# Patient Record
Sex: FEMALE | Race: WHITE | NOT HISPANIC OR LATINO | Employment: OTHER | URBAN - METROPOLITAN AREA
[De-identification: names, ages, dates, MRNs, and addresses within clinical notes are randomized per-mention and may not be internally consistent; named-entity substitution may affect disease eponyms.]

---

## 2023-08-26 PROBLEM — E04.1 THYROID NODULE: Status: ACTIVE | Noted: 2023-08-26

## 2023-08-26 PROBLEM — E03.9 HYPOTHYROID: Status: ACTIVE | Noted: 2023-08-26

## 2023-08-26 PROBLEM — M81.0 OSTEOPOROSIS: Status: ACTIVE | Noted: 2023-08-26

## 2023-08-26 PROBLEM — M79.2 NEUROPATHIC PAIN: Status: ACTIVE | Noted: 2023-08-26

## 2023-08-26 PROBLEM — E21.3 HYPERPARATHYROIDISM (MULTI): Status: ACTIVE | Noted: 2023-08-26

## 2023-08-26 PROBLEM — E11.9 TYPE 2 DIABETES MELLITUS WITHOUT COMPLICATION (MULTI): Status: ACTIVE | Noted: 2023-08-26

## 2023-08-26 PROBLEM — M06.9 RHEUMATOID ARTHRITIS (MULTI): Status: ACTIVE | Noted: 2023-08-26

## 2023-08-26 PROBLEM — Z86.73 HISTORY OF CEREBROVASCULAR ACCIDENT: Status: ACTIVE | Noted: 2023-08-26

## 2023-08-26 PROBLEM — Z98.84 HISTORY OF GASTRIC BYPASS: Status: ACTIVE | Noted: 2023-08-26

## 2023-08-26 PROBLEM — E11.65 HYPERGLYCEMIA DUE TO TYPE 2 DIABETES MELLITUS (MULTI): Status: ACTIVE | Noted: 2023-08-26

## 2023-08-26 PROBLEM — G47.30 SLEEP APNEA: Status: ACTIVE | Noted: 2023-08-26

## 2023-08-26 PROBLEM — N32.81 OVERACTIVE BLADDER: Status: ACTIVE | Noted: 2023-08-26

## 2023-08-26 PROBLEM — K90.9 INTESTINAL MALABSORPTION (HHS-HCC): Status: ACTIVE | Noted: 2023-08-26

## 2023-08-26 PROBLEM — S13.9XXA NECK SPRAIN: Status: ACTIVE | Noted: 2023-08-26

## 2023-08-26 PROBLEM — E83.52 HYPERCALCEMIA: Status: ACTIVE | Noted: 2023-08-26

## 2023-08-26 PROBLEM — E66.01 MORBID (SEVERE) OBESITY DUE TO EXCESS CALORIES (MULTI): Status: ACTIVE | Noted: 2023-08-26

## 2023-08-26 PROBLEM — M54.12 CERVICAL RADICULITIS: Status: ACTIVE | Noted: 2023-08-26

## 2023-08-26 PROBLEM — M10.9 GOUT: Status: ACTIVE | Noted: 2023-08-26

## 2023-08-26 PROBLEM — R53.82 CHRONIC FATIGUE: Status: ACTIVE | Noted: 2023-08-26

## 2023-08-26 PROBLEM — E78.00 PURE HYPERCHOLESTEROLEMIA: Status: ACTIVE | Noted: 2023-08-26

## 2023-08-26 PROBLEM — I10 BENIGN HYPERTENSION: Status: ACTIVE | Noted: 2023-08-26

## 2023-08-26 PROBLEM — M19.90 OSTEOARTHRITIS: Status: ACTIVE | Noted: 2023-08-26

## 2023-08-26 PROBLEM — J32.9 SINUSITIS: Status: ACTIVE | Noted: 2023-08-26

## 2023-08-26 PROBLEM — E53.8 VITAMIN B 12 DEFICIENCY: Status: ACTIVE | Noted: 2023-08-26

## 2023-08-26 PROBLEM — E55.9 VITAMIN D DEFICIENCY: Status: ACTIVE | Noted: 2023-08-26

## 2023-08-26 PROBLEM — G62.9 NEUROPATHY: Status: ACTIVE | Noted: 2023-08-26

## 2023-08-26 PROBLEM — M25.519 SHOULDER JOINT PAIN: Status: ACTIVE | Noted: 2023-08-26

## 2023-08-26 PROBLEM — K21.9 GASTROESOPHAGEAL REFLUX DISEASE: Status: ACTIVE | Noted: 2023-08-26

## 2023-08-26 PROBLEM — S92.902D CLOSED FRACTURE OF LEFT FOOT WITH ROUTINE HEALING: Status: ACTIVE | Noted: 2023-08-26

## 2023-08-26 PROBLEM — F41.9 ANXIETY: Status: ACTIVE | Noted: 2023-08-26

## 2023-08-26 PROBLEM — K63.5 POLYP OF COLON: Status: ACTIVE | Noted: 2023-08-26

## 2023-08-26 PROBLEM — M79.7 FIBROMYALGIA: Status: ACTIVE | Noted: 2023-08-26

## 2023-08-26 RX ORDER — DAPAGLIFLOZIN 10 MG/1
10 TABLET, FILM COATED ORAL EVERY MORNING
COMMUNITY
End: 2024-02-29 | Stop reason: ALTCHOICE

## 2023-08-26 RX ORDER — SULFASALAZINE 500 MG/1
500 TABLET ORAL 2 TIMES DAILY
COMMUNITY
Start: 2021-05-10

## 2023-08-26 RX ORDER — CHOLECALCIFEROL (VITAMIN D3) 1250 MCG
1 TABLET ORAL
COMMUNITY
Start: 2017-09-19

## 2023-08-26 RX ORDER — GABAPENTIN 300 MG/1
600 CAPSULE ORAL NIGHTLY
COMMUNITY
End: 2023-10-10 | Stop reason: ENTERED-IN-ERROR

## 2023-08-26 RX ORDER — GLIMEPIRIDE 2 MG/1
1 TABLET ORAL
COMMUNITY
Start: 2023-02-23

## 2023-08-26 RX ORDER — VENLAFAXINE HYDROCHLORIDE 37.5 MG/1
37.5 TABLET, EXTENDED RELEASE ORAL DAILY
COMMUNITY
End: 2023-12-05 | Stop reason: ALTCHOICE

## 2023-08-26 RX ORDER — ATORVASTATIN CALCIUM 10 MG/1
5 TABLET, FILM COATED ORAL DAILY
COMMUNITY
Start: 2015-01-28 | End: 2023-12-05 | Stop reason: ALTCHOICE

## 2023-08-26 RX ORDER — CALCIUM CARBONATE 200(500)MG
1 TABLET,CHEWABLE ORAL DAILY
COMMUNITY

## 2023-08-26 RX ORDER — CYANOCOBALAMIN 1000 UG/ML
INJECTION, SOLUTION INTRAMUSCULAR; SUBCUTANEOUS
COMMUNITY

## 2023-08-26 RX ORDER — TRAMADOL HYDROCHLORIDE 50 MG/1
50 TABLET ORAL DAILY PRN
COMMUNITY
Start: 2022-12-27 | End: 2023-12-05 | Stop reason: SDUPTHER

## 2023-08-26 RX ORDER — CYCLOBENZAPRINE HCL 10 MG
10 TABLET ORAL NIGHTLY PRN
COMMUNITY

## 2023-08-26 RX ORDER — AMITRIPTYLINE HYDROCHLORIDE 10 MG/1
20 TABLET, FILM COATED ORAL NIGHTLY
COMMUNITY

## 2023-08-26 RX ORDER — COLCHICINE 0.6 MG/1
0.6 TABLET ORAL DAILY
COMMUNITY
Start: 2021-05-10 | End: 2024-02-29 | Stop reason: ALTCHOICE

## 2023-08-26 RX ORDER — OXYBUTYNIN CHLORIDE 5 MG/1
5 TABLET ORAL DAILY
COMMUNITY
Start: 2017-01-31 | End: 2024-02-06 | Stop reason: SDUPTHER

## 2023-08-26 RX ORDER — SERTRALINE HYDROCHLORIDE 25 MG/1
25 TABLET, FILM COATED ORAL DAILY
COMMUNITY
Start: 2023-03-16 | End: 2024-04-25 | Stop reason: SDUPTHER

## 2023-08-26 RX ORDER — DENOSUMAB 60 MG/ML
INJECTION SUBCUTANEOUS
COMMUNITY

## 2023-08-26 RX ORDER — SUCRALFATE 1 G/1
1 TABLET ORAL
COMMUNITY

## 2023-08-26 RX ORDER — OMEPRAZOLE 40 MG/1
40 CAPSULE, DELAYED RELEASE ORAL
COMMUNITY
Start: 2022-09-08

## 2023-08-26 RX ORDER — LEVOTHYROXINE SODIUM 50 UG/1
50 TABLET ORAL
COMMUNITY
End: 2023-12-05 | Stop reason: ALTCHOICE

## 2023-08-26 RX ORDER — LEFLUNOMIDE 10 MG/1
1 TABLET ORAL DAILY
COMMUNITY

## 2023-08-29 ENCOUNTER — HOSPITAL ENCOUNTER (OUTPATIENT)
Dept: DATA CONVERSION | Facility: HOSPITAL | Age: 73
Discharge: HOME | End: 2023-08-29
Payer: MEDICARE

## 2023-08-29 DIAGNOSIS — K75.9 INFLAMMATORY LIVER DISEASE, UNSPECIFIED: ICD-10-CM

## 2023-08-29 DIAGNOSIS — R94.5 ABNORMAL RESULTS OF LIVER FUNCTION STUDIES: ICD-10-CM

## 2023-08-29 DIAGNOSIS — M54.12 RADICULOPATHY, CERVICAL REGION: ICD-10-CM

## 2023-08-29 DIAGNOSIS — M06.09 RHEUMATOID ARTHRITIS WITHOUT RHEUMATOID FACTOR, MULTIPLE SITES (MULTI): ICD-10-CM

## 2023-08-30 LAB
HAV IGM SER QL: NORMAL
HBV CORE IGM SER QL: NORMAL
HBV SURFACE AG SER QL: NORMAL
HCV AB SER QL: NORMAL

## 2023-09-01 ENCOUNTER — HOSPITAL ENCOUNTER (OUTPATIENT)
Dept: DATA CONVERSION | Facility: HOSPITAL | Age: 73
Discharge: HOME | End: 2023-09-01
Payer: MEDICARE

## 2023-09-01 DIAGNOSIS — M06.09 RHEUMATOID ARTHRITIS WITHOUT RHEUMATOID FACTOR, MULTIPLE SITES (MULTI): ICD-10-CM

## 2023-09-19 ENCOUNTER — HOSPITAL ENCOUNTER (OUTPATIENT)
Dept: DATA CONVERSION | Facility: HOSPITAL | Age: 73
Discharge: HOME | End: 2023-09-19
Payer: MEDICARE

## 2023-09-19 DIAGNOSIS — E04.1 NONTOXIC SINGLE THYROID NODULE: ICD-10-CM

## 2023-09-19 LAB — TSH SERPL DL<=0.05 MIU/L-ACNC: 2.93 MIU/L (ref 0.27–4.2)

## 2023-09-29 ENCOUNTER — HOSPITAL ENCOUNTER (OUTPATIENT)
Dept: DATA CONVERSION | Facility: HOSPITAL | Age: 73
Discharge: HOME | End: 2023-09-29
Payer: MEDICARE

## 2023-09-29 DIAGNOSIS — M06.09 RHEUMATOID ARTHRITIS WITHOUT RHEUMATOID FACTOR, MULTIPLE SITES (MULTI): ICD-10-CM

## 2023-10-10 ENCOUNTER — HOSPITAL ENCOUNTER (OUTPATIENT)
Dept: RADIOLOGY | Facility: HOSPITAL | Age: 73
Discharge: HOME | End: 2023-10-10
Payer: MEDICARE

## 2023-10-10 VITALS
SYSTOLIC BLOOD PRESSURE: 143 MMHG | TEMPERATURE: 98.3 F | HEART RATE: 81 BPM | DIASTOLIC BLOOD PRESSURE: 66 MMHG | RESPIRATION RATE: 16 BRPM | OXYGEN SATURATION: 98 %

## 2023-10-10 DIAGNOSIS — R10.13 EPIGASTRIC PAIN SYNDROME: Primary | ICD-10-CM

## 2023-10-10 PROCEDURE — 62325 NJX INTERLAMINAR CRV/THRC: CPT | Performed by: ANESTHESIOLOGY

## 2023-10-10 PROCEDURE — G0500 MOD SEDAT ENDO SERVICE >5YRS: HCPCS | Performed by: ANESTHESIOLOGY

## 2023-10-10 PROCEDURE — 77003 FLUOROGUIDE FOR SPINE INJECT: CPT

## 2023-10-10 PROCEDURE — 2500000004 HC RX 250 GENERAL PHARMACY W/ HCPCS (ALT 636 FOR OP/ED): Performed by: PHYSICAL MEDICINE & REHABILITATION

## 2023-10-10 RX ORDER — FENTANYL CITRATE 50 UG/ML
25 INJECTION, SOLUTION INTRAMUSCULAR; INTRAVENOUS ONCE
Status: DISCONTINUED | OUTPATIENT
Start: 2023-10-10 | End: 2023-10-20 | Stop reason: HOSPADM

## 2023-10-10 RX ORDER — LIDOCAINE HYDROCHLORIDE 5 MG/ML
10 INJECTION, SOLUTION INFILTRATION; PERINEURAL ONCE
Status: DISCONTINUED | OUTPATIENT
Start: 2023-10-10 | End: 2023-10-20 | Stop reason: HOSPADM

## 2023-10-10 RX ORDER — MIDAZOLAM HYDROCHLORIDE 1 MG/ML
2 INJECTION INTRAMUSCULAR; INTRAVENOUS AS NEEDED
Status: DISCONTINUED | OUTPATIENT
Start: 2023-10-10 | End: 2023-10-20 | Stop reason: HOSPADM

## 2023-10-10 RX ADMIN — MIDAZOLAM HYDROCHLORIDE 2 MG: 1 INJECTION INTRAMUSCULAR; INTRAVENOUS at 08:32

## 2023-10-10 ASSESSMENT — PAIN SCALES - GENERAL
PAINLEVEL_OUTOF10: 4
PAINLEVEL_OUTOF10: 6
PAINLEVEL_OUTOF10: 5 - MODERATE PAIN
PAINLEVEL_OUTOF10: 0 - NO PAIN
PAINLEVEL_OUTOF10: 6
PAINLEVEL_OUTOF10: 6
PAINLEVEL_OUTOF10: 0 - NO PAIN

## 2023-10-10 ASSESSMENT — PAIN - FUNCTIONAL ASSESSMENT: PAIN_FUNCTIONAL_ASSESSMENT: 0-10

## 2023-10-10 NOTE — H&P
"Pain Management Clinic Note     Chief Complaint: epigastric pain    History Of Present Illness  Heather Thurston is a 73 y.o. female with a past medical history of DM and arthritis who presents for abdominal pain. She has had chronic epigastric abdominal pain that does not radiate anywhere. States it feels like a \"burning\" pain,  like a hunger pain. It gets better with eating, with only minimal relief. She has had a hiatal hernia fixed, but it did not alleviate her burning pian. She had two celiac plexus blocks which only lasted a couple of days. Her abdominal pain is not affected by position. She has had a previous stomach biopsy which was normal.      Past Medical History  She has a past medical history of Personal history of other diseases of the musculoskeletal system and connective tissue.    Surgical History  She has a past surgical history that includes Other surgical history (12/19/2022); Other surgical history (12/19/2022); Other surgical history (12/19/2022); and Other surgical history (12/19/2022).     Social History  She reports that she has never smoked. She has never used smokeless tobacco. She reports that she does not drink alcohol and does not use drugs.    Family History  Family History   Problem Relation Name Age of Onset    Lung cancer Mother      Liver cancer Father      Diabetes Father      Hypertension Father      Hypertension Brother      Obesity Brother      Diabetes Brother      Obesity Daughter      Hypertension Daughter      Diabetes Daughter          Allergies  Atropine, Codeine, Ezetimibe, Iodinated contrast media, Lisinopril, Metformin hcl, Simvastatin, and Adhesive tape-silicones    Review of Symptoms:   Constitutional: Negative for chills, diaphoresis or fever  HENT: Negative for neck swelling  Eyes:.  Negative for eye pain  Respiratory:.  Negative for cough, shortness of breath or wheezing    Cardiovascular:.  Negative for chest pain or palpitations  Gastrointestinal:.  Negative for " abdominal pain, nausea and vomiting  Genitourinary:.  Negative for urgency  Musculoskeletal:  Positive for abdominal pain. Positive for joint pain. Denies falls within the past 3 months.  Skin: Negative for wounds or itching   Neurological: Negative for dizziness, seizures, loss of consciousness and weakness  Endo/Heme/Allergies: Does not bruise/bleed easily  Psychiatric/Behavioral: Negative for depression. The patient does not appear anxious.           Last Recorded Vitals  There were no vitals taken for this visit.    Relevant Results  Current Outpatient Medications   Medication Instructions    amitriptyline (ELAVIL) 20 mg, oral, Nightly    atorvastatin (LIPITOR) 5 mg, oral, Daily    calcium carbonate (Tums) 200 mg calcium chewable tablet 1 tablet, oral, Daily    CALCIUM CITRATE ORAL 500 mg, oral, 2 times daily    cholecalciferol (Vitamin D3) 1,250 mcg (50,000 unit) tablet 1 capsule, oral, Weekly    colchicine (gout) 0.6 mg, oral, Daily    cyanocobalamin (Vitamin B-12) 1,000 mcg/mL injection intramuscular, Every 30 days    cyclobenzaprine (FLEXERIL) 10 mg, oral, Nightly PRN    dapagliflozin propanediol (FARXIGA) 10 mg, oral, Every morning    denosumab (Prolia) 60 mg/mL syringe subcutaneous, As directed    gabapentin (NEURONTIN) 600 mg, oral, Nightly    glimepiride (AMARYL) 3 mg, oral, Daily before breakfast    Lactobacillus acidophilus (PROBIOTIC ORAL) as directed Orally    leflunomide (Arava) 10 mg tablet 1 tablet, oral, Daily    levothyroxine (SYNTHROID, LEVOXYL) 50 mcg, oral, Daily before breakfast    multivit-minerals/folic acid (ADULT ONE DAILY MULTIVITAMIN ORAL) oral    omeprazole (PRILOSEC) 40 mg, oral, 2 times daily before meals    oxybutynin (DITROPAN) 5 mg, oral, Daily    sertraline (ZOLOFT) 25 mg, oral, Daily    sucralfate (CARAFATE) 1 g, oral, ON AN EMPTY STOMACH, CRUSH 3 TIMES DAILY    sulfaSALAzine (AZULFIDINE) 500 mg, oral, 2 times daily    traMADol (ULTRAM) 50 mg, oral, Daily PRN    venlafaxine  37.5 mg, oral, Daily         MR cervical spine wo IV contrast     Narrative  PROCEDURE:         SPINE CERVICAL WO CONTRAST - [facility]  2008  REASON FOR EXAM: RADICULOPATHY, CERVICAL REGION    RESULT: MRN: [id number]  Patient Name: LENIN WRIGHT    STUDY:  SPINE CERVICAL WO CONTRAST; 8/29/2023 3:01 pm    INDICATION:  RADICULOPATHY, CERVICAL REGION.    COMPARISON:  07/30/2020    ACCESSION NUMBER(S):  [id number]    ORDERING CLINICIAN:  CHRISTIAN SAUNDERS    TECHNIQUE:  Multiecho and multiplanar imaging of the cervical spine without contrast was  performed.    FINDINGS:  There is grade 1 anterolisthesis of C3 - C4 and more minimally at C4-C5.  Bone marrow signal is somewhat heterogeneous, suggesting a component of  marrow reconversion.  There is no specific cervical spinal cord signal abnormality. Multilevel  disc space narrowing throughout the cervical spine is most pronounced at  C3-C4, C5-C6, and C6-C7 with osteophyte formation.      C2-3: No significant spinal canal or neural foraminal narrowing.    C3-4: Mild circumferential disc bulge partially effaces the ventral thecal  sac. Mild uncovertebral joint hypertrophy results in left neural foraminal  narrowing.    C4-5: Posterior disc osteophyte complex with mild circumferential disc bulge  effaces the ventral thecal sac. There is also mild uncovertebral joint  hypertrophy and mild bilateral neural foraminal narrowing.    C5-6: Posterior disc osteophyte complex with ligamentum flavum hypertrophy  and facet arthropathy results in mild spinal canal stenosis. There is  uncovertebral joint hypertrophy and resultant mild left neural foraminal  narrowing.    C6-7: Prominent disc osteophyte complex effaces the ventral thecal sac.  There is uncovertebral joint hypertrophy with resultant mild left neural  foraminal narrowing.    C7-T1: Posterior disc osteophyte complex with broad-based disc herniation  effaces the ventral thecal sac. Thereis  no significant spinal canal or  neural  foraminal narrowing.    Calcified right thyroid nodule is again noted.    Impression  Multilevel degenerative changes of the cervical spine with variable degree  of spinal canal and neural foraminal narrowing as detailed above, worst at  C4-5 and C5-C6.    MACRO:  None.  Dictation workstation:   [id number]    Original Interpreting Physician:   JUAN MAURICIO MD  Original Transcribed by/Date: MMODAL Aug 29 2023  2:08P  Original Electronically Signed by/Date: JUAN MAURICIO MD Aug 30 2023  8:47A    Addendum Interpreting Physician:  Addendum Transcribed by/Date: NO ADDENDUM  Addendum Electronically Signed by/Date:      MR lumbar spine wo IV contrast     Narrative  PROCEDURE:         SPINE LUMBAR WO CONTRAST - [facility]  2010  REASON FOR EXAM: SPINAL STENOSIS,LUMBAR REGION WITH NEUROGENIC CLAUDICATION    RESULT: SPINE LUMBAR WO CONTRAST: 8/12/2021 12:11 PM    CLINICAL HISTORY: 71-year-old woman with mid to lower back pain, bilateral  leg pain, and right leg weakness. History of gastric bypass surgery.    COMPARISON: CT abdomen and pelvis 11/14/2014.    TECHNIQUE:  Multiecho and multiplanar imaging of the lumbar spine was performed without  IV contrast.    FINDINGS:    Vertebral body heights overall appear maintained. There is moderate to  severe disc space narrowing at L4-L5 with osteophyte formation. There is  moderate disc space narrowing at L5-S1 with osteophyte formation. More mild  disc space narrowing and disc desiccation is seen at the remaining lumbar  levels.    Alignment: There is grade 1 anterolisthesis of L4 on L5.    Bone marrow signal: Minimally increased STIR signal at the endplate of  L4-L5, suggesting acute reactive bone marrow edema in the setting of  degenerative changes. Suspected vertebral body hemangioma in L1, denoted by  T1 and T2 hyperintense focus and thickening of the trabeculae on the axial  view.    Conus: The conus medullaris terminates at the level of L2.    Paraspinal Soft tissues: Mild  fatty atrophy of the paraspinal lumbar muscles.    Imaged Abdomen: Suspect an incidental circumaortic left renal vein.    T12-L1: No significant spinal canal or neural foraminal narrowing.    L1-2: No significant spinal canal or neural foraminal narrowing.    L2-3: Mild posterior disc bulge with ligamentum flavum hypertrophy and mild  facet arthropathy. No significant spinal canal narrowing. There is mild  bilateral neural foraminal narrowing, more pronounced on the left.    L3-4: Mild circumferential disc bulge with facet arthropathy and ligamentum  flavum hypertrophy noted. This results in mild spinal canal stenosis. There  is also mild to moderate bilateral neural foraminal narrowing with contact  upon the exiting nerve roots.    L4-5: Spondylolisthesis with moderate circumferential disc bulge, ligamentum  flavum hypertrophy, and facet arthropathy results in moderate to severe  spinal canal stenosis with moderate to severe bilateral neural foraminal  narrowing and contact upon the exiting nerve roots. There may be a component  of impingement upon the exiting right-sided nerve root as well (series 9,  image 10).    L5-S1: Broad-based disc bulge is identified with facet hypertrophy. No  significant spinal canal narrowing. There is moderate bilateral neural  foraminal narrowing.    Impression  1. Multilevel lumbar spondylosis is most severe at L4-L5 and L5-S1.    2. At L4-L5, there is moderate to severe spinal canal stenosis and moderate  to severe bilateral neural foraminal narrowing. There is likely a component  of impingement upon the exiting right-sided nerve root at this level.    3. Moderate bilateral neural foraminal narrowing at L5-S1.    4. Please see above for additional findings at each individual disc level.    [id number]    This report has been produced using speech recognition.    Original Interpreting Physician:   EKATERINA CORTEZ MD  Original Transcribed by/Date: [facility]   Aug 12 2021   1:16P  Original Electronically Signed by/Date: EKATERINA CORTEZ MD Aug 12 2021  1:16P    Addendum Interpreting Physician:  Addendum Transcribed by/Date: NO ADDENDUM  Addendum Electronically Signed by/Date:     No image results found.       No diagnosis found.     ASSESSMENT/PLAN  Heather Thurston is a 73 y.o. female with chronic epigastric pain, negative workup and short-term relief for a couple of days. Presents today for differential block.     Our plan is as follows:  - Proceed with differential block  - Burning pain went down from VAS 8 to 4. She had 50% relief.  Anesthetized up to T4 during differential. Patient would like to proceed with celiac plexus ablation. We will schedule her.     Staffed with Dr. Bernal, pain attending.    Nusrat Wolf DO    Pain Fellow

## 2023-10-10 NOTE — PROCEDURES
Ms. Thurston is a 73-year-old lady presenting for diagnostic differential epidural block for her epigastric burning sensation.  She rates it as 8 on a scale of 0-10 today.  The patient has had 2 previous elective axis blocks by Dr. Sexton at [facility] with transient relief between 2 and 7 days.  She was being considered for a splanchnic nerve radiofrequency ablation procedure.  We have elected to proceed first with a diagnostic differential block.    After appropriate informed consent was obtained, patient was brought to the operating room and placed in the prone position.  The back area was prepped and draped with chlorhexidine in the usual sterile fashion.  Using fluoroscopic guidance skin and the subcutaneous tissue overlying needle trajectory to the T12/L1 interlaminar epidural space was anesthesized with 3 mL 0.5% lidocaine using a left paraspinous approach.  An 18-gauge Touhy needle was then advanced under fluoroscopic guidance into the T12/L1 interlaminar epidural space.  Entry into the epidural space was confirmed using loss was technique with a glass syringe and 2 cc of air.  A 20-gauge epidural catheter was then advanced under fluoroscopic guidance the top the of T11 vertebral body.  Initially the catheter was veering to the right of the midline as documented by injection of small amounts of iohexol contrast.  The loss of resistance was technique was repeated on 2 occasions until the catheter was midline.  Injection of iohexol contrast revealed appropriate epidural spread in the AP and lateral views.  The needle was then removed and the catheter was secured in place and injection of contrast revealed again appropriate spread.  The patient was then transferred to the recovery room in stable condition.     In the recovery area, the patient was provided with saline injection in the epidural space which did not change her sensory level or pain level score.  Subsequently she was given 2 boluses  of 2% lidocaine, 5 cc each with resultant touch sensory block to T6 and temperature sensory block to T3.  At that time, the patient's burning discomfort was described as 4 on a scale of 0-10.  The epidural catheter was then removed uneventfully.    The patient stated that she would like the discomfort to go down to 0 on a scale of 0-10.  She was advised that this may not be possible and that her residual pain, roughly 50% of the original discomfort, may be related to central sensitization or and cephalization of her pain similar to phantom limb pain.  She was explained that a splanchnic nerve radiofrequency ablation would at most provide her with 50% relief.  She wanted to proceed with the procedure despite stating that she would want her discomfort score to go down to 0 on a scale of 0-10.    Splanchnic nerve radiofrequency ablation procedure will be planned.  If the patient does not experience significant relief, she may benefit from cognitive behavioral therapy.

## 2023-10-16 ENCOUNTER — OFFICE VISIT (OUTPATIENT)
Dept: SURGERY | Facility: CLINIC | Age: 73
End: 2023-10-16
Payer: MEDICARE

## 2023-10-16 DIAGNOSIS — E53.8 VITAMIN B 12 DEFICIENCY: Primary | ICD-10-CM

## 2023-10-16 PROCEDURE — 1159F MED LIST DOCD IN RCRD: CPT

## 2023-10-16 PROCEDURE — 96372 THER/PROPH/DIAG INJ SC/IM: CPT

## 2023-10-16 PROCEDURE — 1036F TOBACCO NON-USER: CPT

## 2023-10-16 PROCEDURE — 1126F AMNT PAIN NOTED NONE PRSNT: CPT

## 2023-10-16 PROCEDURE — 3052F HG A1C>EQUAL 8.0%<EQUAL 9.0%: CPT

## 2023-10-16 RX ORDER — CYANOCOBALAMIN 1000 UG/ML
1000 INJECTION, SOLUTION INTRAMUSCULAR; SUBCUTANEOUS ONCE
Status: COMPLETED | OUTPATIENT
Start: 2023-10-16 | End: 2023-10-16

## 2023-10-16 RX ORDER — CHROMIUM PICOLINATE 200 MCG
TABLET ORAL
COMMUNITY
End: 2024-02-29 | Stop reason: ALTCHOICE

## 2023-10-16 RX ORDER — ASCORBIC ACID 250 MG
250 TABLET,CHEWABLE ORAL
COMMUNITY

## 2023-10-16 RX ADMIN — CYANOCOBALAMIN 1000 MCG: 1000 INJECTION, SOLUTION INTRAMUSCULAR; SUBCUTANEOUS at 10:42

## 2023-10-16 ASSESSMENT — COLUMBIA-SUICIDE SEVERITY RATING SCALE - C-SSRS
6. HAVE YOU EVER DONE ANYTHING, STARTED TO DO ANYTHING, OR PREPARED TO DO ANYTHING TO END YOUR LIFE?: NO
1. IN THE PAST MONTH, HAVE YOU WISHED YOU WERE DEAD OR WISHED YOU COULD GO TO SLEEP AND NOT WAKE UP?: NO
2. HAVE YOU ACTUALLY HAD ANY THOUGHTS OF KILLING YOURSELF?: NO

## 2023-10-16 ASSESSMENT — ENCOUNTER SYMPTOMS
OCCASIONAL FEELINGS OF UNSTEADINESS: 0
DEPRESSION: 0
LOSS OF SENSATION IN FEET: 0

## 2023-10-16 ASSESSMENT — PATIENT HEALTH QUESTIONNAIRE - PHQ9
2. FEELING DOWN, DEPRESSED OR HOPELESS: NOT AT ALL
SUM OF ALL RESPONSES TO PHQ9 QUESTIONS 1 AND 2: 0
1. LITTLE INTEREST OR PLEASURE IN DOING THINGS: NOT AT ALL

## 2023-10-16 ASSESSMENT — PAIN SCALES - GENERAL: PAINLEVEL: 0-NO PAIN

## 2023-10-25 ENCOUNTER — APPOINTMENT (OUTPATIENT)
Dept: RADIOLOGY | Facility: HOSPITAL | Age: 73
End: 2023-10-25
Payer: MEDICARE

## 2023-10-27 ENCOUNTER — APPOINTMENT (OUTPATIENT)
Dept: PRIMARY CARE | Facility: CLINIC | Age: 73
End: 2023-10-27
Payer: MEDICARE

## 2023-10-27 PROBLEM — M48.061 SPINAL STENOSIS OF LUMBAR REGION: Status: ACTIVE | Noted: 2021-08-09

## 2023-10-31 ENCOUNTER — HOSPITAL ENCOUNTER (OUTPATIENT)
Dept: RADIOLOGY | Facility: HOSPITAL | Age: 73
Discharge: HOME | End: 2023-10-31
Payer: MEDICARE

## 2023-10-31 VITALS
RESPIRATION RATE: 18 BRPM | HEART RATE: 72 BPM | HEIGHT: 63 IN | WEIGHT: 160 LBS | OXYGEN SATURATION: 95 % | DIASTOLIC BLOOD PRESSURE: 48 MMHG | BODY MASS INDEX: 28.35 KG/M2 | TEMPERATURE: 98 F | SYSTOLIC BLOOD PRESSURE: 118 MMHG

## 2023-10-31 PROCEDURE — G0500 MOD SEDAT ENDO SERVICE >5YRS: HCPCS | Performed by: ANESTHESIOLOGY

## 2023-10-31 PROCEDURE — 64680 INJECTION TREATMENT OF NERVE: CPT | Performed by: ANESTHESIOLOGY

## 2023-10-31 PROCEDURE — 77003 FLUOROGUIDE FOR SPINE INJECT: CPT

## 2023-10-31 PROCEDURE — 2500000004 HC RX 250 GENERAL PHARMACY W/ HCPCS (ALT 636 FOR OP/ED): Performed by: STUDENT IN AN ORGANIZED HEALTH CARE EDUCATION/TRAINING PROGRAM

## 2023-10-31 PROCEDURE — 2720000007 HC OR 272 NO HCPCS

## 2023-10-31 RX ORDER — ROPIVACAINE HYDROCHLORIDE 5 MG/ML
10 INJECTION, SOLUTION EPIDURAL; INFILTRATION; PERINEURAL ONCE
Status: DISCONTINUED | OUTPATIENT
Start: 2023-10-31 | End: 2023-11-01 | Stop reason: HOSPADM

## 2023-10-31 RX ORDER — FENTANYL CITRATE 50 UG/ML
50 INJECTION, SOLUTION INTRAMUSCULAR; INTRAVENOUS ONCE
Status: COMPLETED | OUTPATIENT
Start: 2023-10-31 | End: 2023-10-31

## 2023-10-31 RX ORDER — BUPIVACAINE HYDROCHLORIDE 2.5 MG/ML
10 INJECTION, SOLUTION INFILTRATION; PERINEURAL ONCE
Status: DISCONTINUED | OUTPATIENT
Start: 2023-10-31 | End: 2023-11-01 | Stop reason: HOSPADM

## 2023-10-31 RX ORDER — DEXAMETHASONE SODIUM PHOSPHATE 100 MG/10ML
10 INJECTION INTRAMUSCULAR; INTRAVENOUS AS NEEDED
Status: DISCONTINUED | OUTPATIENT
Start: 2023-10-31 | End: 2023-11-01 | Stop reason: HOSPADM

## 2023-10-31 RX ORDER — LIDOCAINE HYDROCHLORIDE 5 MG/ML
10 INJECTION, SOLUTION INFILTRATION; PERINEURAL ONCE
Status: DISCONTINUED | OUTPATIENT
Start: 2023-10-31 | End: 2023-11-01 | Stop reason: HOSPADM

## 2023-10-31 RX ORDER — FENTANYL CITRATE 50 UG/ML
25 INJECTION, SOLUTION INTRAMUSCULAR; INTRAVENOUS ONCE
Status: DISCONTINUED | OUTPATIENT
Start: 2023-10-31 | End: 2023-11-01 | Stop reason: HOSPADM

## 2023-10-31 RX ORDER — MIDAZOLAM HYDROCHLORIDE 1 MG/ML
2 INJECTION, SOLUTION INTRAMUSCULAR; INTRAVENOUS AS NEEDED
Status: DISCONTINUED | OUTPATIENT
Start: 2023-10-31 | End: 2023-11-01 | Stop reason: HOSPADM

## 2023-10-31 RX ORDER — LIDOCAINE HYDROCHLORIDE 20 MG/ML
10 INJECTION, SOLUTION EPIDURAL; INFILTRATION; INTRACAUDAL; PERINEURAL AS NEEDED
Status: DISCONTINUED | OUTPATIENT
Start: 2023-10-31 | End: 2023-11-01 | Stop reason: HOSPADM

## 2023-10-31 RX ORDER — MIDAZOLAM HYDROCHLORIDE 1 MG/ML
2 INJECTION, SOLUTION INTRAMUSCULAR; INTRAVENOUS ONCE
Status: COMPLETED | OUTPATIENT
Start: 2023-10-31 | End: 2023-10-31

## 2023-10-31 RX ADMIN — MIDAZOLAM HYDROCHLORIDE 2 MG: 1 INJECTION, SOLUTION INTRAMUSCULAR; INTRAVENOUS at 13:27

## 2023-10-31 RX ADMIN — FENTANYL CITRATE 50 MCG: 50 INJECTION INTRAMUSCULAR; INTRAVENOUS at 13:27

## 2023-10-31 ASSESSMENT — PAIN SCALES - GENERAL
PAINLEVEL_OUTOF10: 6
PAINLEVEL_OUTOF10: 6
PAINLEVEL_OUTOF10: 4
PAINLEVEL_OUTOF10: 6
PAINLEVEL_OUTOF10: 0 - NO PAIN
PAINLEVEL_OUTOF10: 8

## 2023-10-31 ASSESSMENT — PAIN - FUNCTIONAL ASSESSMENT
PAIN_FUNCTIONAL_ASSESSMENT: 0-10
PAIN_FUNCTIONAL_ASSESSMENT: 0-10

## 2023-10-31 NOTE — PROCEDURES
PROCEDURE: Bilateral radiofrequency neurotomy of the splanchnic nerves using fluoroscopic guidance     Heather Thurston is a 73-year-old lady with history of burning epigastric pain presenting today for bilateral splanchnic radiofrequency neurotomy having had successful injections by an outside pain practitioner as well as an appropriate response to a differential nerve block performed by us.    Following informed consent, the patient was brought to the operating room and placed in the prone position.  The back area was prepped with chlorhexidine and draped in the usual sterile fashion.  Using fluoroscopic guidance the skin and subcutaneous tissue overlying needle trajectory to the appropriate location were anesthetized with a total of 5 cc 0.5% lidocaine bilaterally.  Two 18-gauge angio-catheters were introduced through the skin and through these, blunt tipped 20-gauge radiofrequency needles were advanced to the appropriate target site at the junction of the anterior third and lower third of the T12 vertebral body.  The right needle was introduced first and then the left needle.  Needle tip positions were confirmed in AP and lateral views.  Sensory stimulation at 50 Hz resulted in paresthesia sensation in the upper abdominal region.  No contractions were noted with motor stimulation.  Thereafter, 2 cc 2% lidocaine was applied at each needle tip and radiofrequency neurotomy was carried out at 80 degrees for 90 seconds x 2.  At the conclusion of the procedure, the needles and the Angiocath were removed.  Band-Aids were applied and the patient was transferred to the recovery room in stable condition.  The patient will update us on the response to the intervention on an outpatient basis.

## 2023-11-08 ENCOUNTER — DOCUMENTATION (OUTPATIENT)
Dept: INFUSION THERAPY | Facility: CLINIC | Age: 73
End: 2023-11-08
Payer: MEDICARE

## 2023-11-08 DIAGNOSIS — M06.09 RHEUMATOID ARTHRITIS OF MULTIPLE SITES WITHOUT RHEUMATOID FACTOR (MULTI): Primary | ICD-10-CM

## 2023-11-08 RX ORDER — ALBUTEROL SULFATE 0.83 MG/ML
3 SOLUTION RESPIRATORY (INHALATION) AS NEEDED
Status: CANCELLED | OUTPATIENT
Start: 2023-11-08

## 2023-11-08 RX ORDER — EPINEPHRINE 0.3 MG/.3ML
0.3 INJECTION SUBCUTANEOUS EVERY 5 MIN PRN
Status: CANCELLED | OUTPATIENT
Start: 2023-11-08

## 2023-11-08 RX ORDER — FAMOTIDINE 10 MG/ML
20 INJECTION INTRAVENOUS ONCE AS NEEDED
Status: CANCELLED | OUTPATIENT
Start: 2023-11-08

## 2023-11-08 RX ORDER — DIPHENHYDRAMINE HYDROCHLORIDE 50 MG/ML
50 INJECTION INTRAMUSCULAR; INTRAVENOUS AS NEEDED
Status: CANCELLED | OUTPATIENT
Start: 2023-11-08

## 2023-11-08 NOTE — PROGRESS NOTES
CLINICAL CLEARANCE:    Patient to be scheduled for (continuation ) of Cimzia infusions.  For Diagnosis: ( Rheumatoid Arthritis )  Dosed at: 400 MG  Labs…  CBC drawn:_ 8/7/2023  CMP drawn:_ 8/7/2023  TB drawn/results: 8/11/2023 NEGATIVE  Hep B SAG drawn/results: 8/29/2023 NONREACTIVE  Latex allergy? No  (PFS may include latex)  History of heart failure?  No (may exacerbate heart failure)  Last injection received: ___ (if continuation)   Due: ASAP - PER DR. KEMP  These results meet criteria to treat.

## 2023-11-17 DIAGNOSIS — E53.8 VITAMIN B 12 DEFICIENCY: Primary | ICD-10-CM

## 2023-11-17 RX ORDER — CYANOCOBALAMIN 1000 UG/ML
1000 INJECTION, SOLUTION INTRAMUSCULAR; SUBCUTANEOUS ONCE
Status: COMPLETED | OUTPATIENT
Start: 2023-11-20 | End: 2023-11-20

## 2023-11-20 ENCOUNTER — OFFICE VISIT (OUTPATIENT)
Dept: SURGERY | Facility: CLINIC | Age: 73
End: 2023-11-20
Payer: MEDICARE

## 2023-11-20 DIAGNOSIS — E53.8 VITAMIN B 12 DEFICIENCY: Primary | ICD-10-CM

## 2023-11-20 PROCEDURE — 1036F TOBACCO NON-USER: CPT

## 2023-11-20 PROCEDURE — 3052F HG A1C>EQUAL 8.0%<EQUAL 9.0%: CPT

## 2023-11-20 PROCEDURE — 1126F AMNT PAIN NOTED NONE PRSNT: CPT

## 2023-11-20 PROCEDURE — 96372 THER/PROPH/DIAG INJ SC/IM: CPT

## 2023-11-20 PROCEDURE — 1159F MED LIST DOCD IN RCRD: CPT

## 2023-11-20 RX ADMIN — CYANOCOBALAMIN 1000 MCG: 1000 INJECTION, SOLUTION INTRAMUSCULAR; SUBCUTANEOUS at 10:48

## 2023-12-05 ENCOUNTER — OFFICE VISIT (OUTPATIENT)
Dept: PRIMARY CARE | Facility: CLINIC | Age: 73
End: 2023-12-05
Payer: MEDICARE

## 2023-12-05 VITALS
SYSTOLIC BLOOD PRESSURE: 125 MMHG | TEMPERATURE: 97.8 F | HEIGHT: 63 IN | BODY MASS INDEX: 29.06 KG/M2 | WEIGHT: 164 LBS | OXYGEN SATURATION: 99 % | DIASTOLIC BLOOD PRESSURE: 76 MMHG | HEART RATE: 86 BPM

## 2023-12-05 DIAGNOSIS — K90.9 INTESTINAL MALABSORPTION, UNSPECIFIED TYPE (HHS-HCC): ICD-10-CM

## 2023-12-05 DIAGNOSIS — M19.90 CHRONIC ARTHRITIS: ICD-10-CM

## 2023-12-05 DIAGNOSIS — E78.00 PURE HYPERCHOLESTEROLEMIA: ICD-10-CM

## 2023-12-05 DIAGNOSIS — E53.8 VITAMIN B 12 DEFICIENCY: ICD-10-CM

## 2023-12-05 DIAGNOSIS — I10 BENIGN HYPERTENSION: Primary | ICD-10-CM

## 2023-12-05 DIAGNOSIS — E11.65 TYPE 2 DIABETES MELLITUS WITH HYPERGLYCEMIA, WITHOUT LONG-TERM CURRENT USE OF INSULIN (MULTI): ICD-10-CM

## 2023-12-05 DIAGNOSIS — E03.9 ACQUIRED HYPOTHYROIDISM: ICD-10-CM

## 2023-12-05 DIAGNOSIS — E55.9 VITAMIN D DEFICIENCY: ICD-10-CM

## 2023-12-05 DIAGNOSIS — K21.9 GASTROESOPHAGEAL REFLUX DISEASE WITHOUT ESOPHAGITIS: ICD-10-CM

## 2023-12-05 DIAGNOSIS — F41.9 ANXIETY: ICD-10-CM

## 2023-12-05 LAB — POC HEMOGLOBIN A1C: 7.2 % (ref 4.2–6.5)

## 2023-12-05 PROCEDURE — 83036 HEMOGLOBIN GLYCOSYLATED A1C: CPT | Performed by: INTERNAL MEDICINE

## 2023-12-05 PROCEDURE — 3078F DIAST BP <80 MM HG: CPT | Performed by: INTERNAL MEDICINE

## 2023-12-05 PROCEDURE — 99214 OFFICE O/P EST MOD 30 MIN: CPT | Performed by: INTERNAL MEDICINE

## 2023-12-05 PROCEDURE — 1159F MED LIST DOCD IN RCRD: CPT | Performed by: INTERNAL MEDICINE

## 2023-12-05 PROCEDURE — 90662 IIV NO PRSV INCREASED AG IM: CPT | Performed by: INTERNAL MEDICINE

## 2023-12-05 PROCEDURE — 1036F TOBACCO NON-USER: CPT | Performed by: INTERNAL MEDICINE

## 2023-12-05 PROCEDURE — 1125F AMNT PAIN NOTED PAIN PRSNT: CPT | Performed by: INTERNAL MEDICINE

## 2023-12-05 PROCEDURE — G0008 ADMIN INFLUENZA VIRUS VAC: HCPCS | Performed by: INTERNAL MEDICINE

## 2023-12-05 PROCEDURE — 3052F HG A1C>EQUAL 8.0%<EQUAL 9.0%: CPT | Performed by: INTERNAL MEDICINE

## 2023-12-05 PROCEDURE — 3074F SYST BP LT 130 MM HG: CPT | Performed by: INTERNAL MEDICINE

## 2023-12-05 RX ORDER — GABAPENTIN 300 MG/1
300 CAPSULE ORAL 3 TIMES DAILY
COMMUNITY

## 2023-12-05 RX ORDER — TRAMADOL HYDROCHLORIDE 50 MG/1
50 TABLET ORAL DAILY PRN
Qty: 90 TABLET | Refills: 0 | Status: SHIPPED | OUTPATIENT
Start: 2023-12-05 | End: 2024-08-31

## 2023-12-05 ASSESSMENT — ENCOUNTER SYMPTOMS
OCCASIONAL FEELINGS OF UNSTEADINESS: 0
PALPITATIONS: 0
LOSS OF SENSATION IN FEET: 0
DEPRESSION: 0
SHORTNESS OF BREATH: 0

## 2023-12-05 ASSESSMENT — PATIENT HEALTH QUESTIONNAIRE - PHQ9
1. LITTLE INTEREST OR PLEASURE IN DOING THINGS: NOT AT ALL
SUM OF ALL RESPONSES TO PHQ9 QUESTIONS 1 AND 2: 1
2. FEELING DOWN, DEPRESSED OR HOPELESS: SEVERAL DAYS
10. IF YOU CHECKED OFF ANY PROBLEMS, HOW DIFFICULT HAVE THESE PROBLEMS MADE IT FOR YOU TO DO YOUR WORK, TAKE CARE OF THINGS AT HOME, OR GET ALONG WITH OTHER PEOPLE: NOT DIFFICULT AT ALL

## 2023-12-05 ASSESSMENT — PAIN SCALES - GENERAL: PAINLEVEL: 3

## 2023-12-05 NOTE — PROGRESS NOTES
[facility]: [facility]  PROGRESS NOTE      Heather Thurston is a 73 y.o. female that is presenting today for Diabetes (6 mo fu).    Assessment/Plan   Diagnoses and all orders for this visit:  Benign hypertension  Comments:  BP doing OK.  Orders:  -     Comprehensive Metabolic Panel; Future  Pure hypercholesterolemia  -     Lipid Panel; Future  -     Comprehensive Metabolic Panel; Future  -     Lipid Panel; Future  Type 2 diabetes mellitus with hyperglycemia, without long-term current use of insulin (CMS/[facility])  Comments:  Eye exam yearly. HGA1C goal less than 7% at  Orders:  -     Hemoglobin A1C; Future  -     CBC; Future  -     CBC; Future  -     Hemoglobin A1C; Future  -     Albumin , Urine Random; Future  Acquired hypothyroidism  -     TSH with reflex to Free T4 if abnormal; Future  Vitamin B 12 deficiency  -     Vitamin B12; Future  Vitamin D deficiency  Gastroesophageal reflux disease without esophagitis  Comments:  stable.  Intestinal malabsorption, unspecified type  Chronic arthritis  Comments:  Tramadol daily as needed for severe arthritis pain.  Orders:  -     traMADol (Ultram) 50 mg tablet; Take 1 tablet (50 mg) by mouth once daily as needed for moderate pain (4 - 6) (OA/RA).  Anxiety  Comments:  Mood wise doing well.  Other orders  -     Flu vaccine, quadrivalent, high-dose, preservative free, age 65y+ (FLUZONE)    Subjective   DM, poorly controlled, eye exam yearly. HTN, low salt diet. Malabsorption, GI issues. Weight little up.Farxiga finacial help with meds, Caraline.    Diabetes  Pertinent negatives for diabetes include no chest pain.     Review of Systems   Respiratory:  Negative for shortness of breath.    Cardiovascular:  Negative for chest pain and palpitations.   All other systems reviewed and are negative.     Objective   There were no vitals filed for this visit.   There is no height or weight on file to calculate BMI.  Physical Exam  Constitutional:       General: She is  not in acute distress.     Appearance: She is obese.   HENT:      Head: Normocephalic and atraumatic.      Right Ear: Tympanic membrane normal.      Left Ear: Tympanic membrane normal.      Mouth/Throat:      Mouth: Mucous membranes are moist.      Pharynx: Oropharynx is clear.   Eyes:      Extraocular Movements: Extraocular movements intact.      Conjunctiva/sclera: Conjunctivae normal.      Pupils: Pupils are equal, round, and reactive to light.   Cardiovascular:      Rate and Rhythm: Normal rate and regular rhythm.   Pulmonary:      Breath sounds: Normal breath sounds.   Abdominal:      General: Bowel sounds are normal.      Palpations: Abdomen is soft. There is no mass.   Musculoskeletal:         General: Normal range of motion.      Cervical back: Neck supple. No tenderness.      Right lower leg: Edema (trace) present.      Left lower leg: Edema (trace) present.   Skin:     General: Skin is warm and dry.   Neurological:      General: No focal deficit present.      Mental Status: She is oriented to person, place, and time.     Diagnostic Results   Lab Results   Component Value Date    GLUCOSE 118 (H) 05/23/2023    CALCIUM 10.0 05/23/2023     05/23/2023    K 4.1 05/23/2023    CO2 29 05/23/2023     05/23/2023    BUN 11 05/23/2023    CREATININE 0.8 05/23/2023     Lab Results   Component Value Date    ALT 10 07/21/2022    AST 20 07/21/2022    ALKPHOS 56 07/21/2022    BILITOT 0.2 07/21/2022     Lab Results   Component Value Date    WBC 5.8 11/22/2022    HGB 11.0 (L) 11/22/2022    HCT 36.2 11/22/2022    MCV 85.0 11/22/2022     11/22/2022     Lab Results   Component Value Date    CHOL 164 07/21/2022    CHOL 150 07/22/2021    CHOL 116 (L) 07/15/2020     Lab Results   Component Value Date    HDL 59 07/21/2022    HDL 63 07/22/2021    HDL 57 07/15/2020     Lab Results   Component Value Date    LDLCALC 91 07/21/2022    LDLCALC 74 07/22/2021    LDLCALC 48 (L) 07/15/2020     Lab Results   Component Value  "Date    TRIG 70 07/21/2022    TRIG 66 07/22/2021    TRIG 55 07/15/2020     No components found for: \"CHOLHDL\"  Lab Results   Component Value Date    HGBA1C 8.3 (H) 05/23/2023     Other labs not included in the list above were reviewed either before or during this encounter.    History    Past Medical History:   Diagnosis Date   • Adrenal mass (CMS/HCC)     '05 to '14 no change by CT   • Chronic arthritis     tramadol prn   • COVID    • CVA (cerebral vascular accident) (CMS/HCC)    • DJD (degenerative joint disease)    • Fibromyalgia    • GERD (gastroesophageal reflux disease)    • Gout    • HLD (hyperlipidemia)    • HTN (hypertension)     htn ecg nsr   • Osteoporosis     DEXA OSTEOPOROSIS pROLIA   • Personal history of other diseases of the musculoskeletal system and connective tissue     History of rheumatoid arthritis   • Plantar fasciitis of left foot    • Thyroid nodule     10/22  ENT  eval neg.     Past Surgical History:   Procedure Laterality Date   • EYE SURGERY      LOWER EYELID, LID RAISE   • LAPAROSCOPIC GASTRIC BANDING  05/21/2007    LAPAROSCOPIC ADJUSTABLE GASTRIC BAND UTILIZING 11 CM VANGUARD BAND, ENTEROLYSIS (ELISE-NATIVIDAD)   • OTHER SURGICAL HISTORY  12/19/2022    Knee replacement   • OTHER SURGICAL HISTORY  12/19/2022    Carpal tunnel surgery   • OTHER SURGICAL HISTORY  12/19/2022    Cholecystectomy   • OTHER SURGICAL HISTORY  12/19/2022    Hysterectomy   • TOTAL KNEE ARTHROPLASTY      LEFT TOTAL KNEE ARTHROPLASTY (DR. DOUGLAS)   • TOTAL KNEE ARTHROPLASTY  2015    RT TKA (DR. HARRIS)     Family History   Problem Relation Name Age of Onset   • Lung cancer Mother     • Liver cancer Father     • Diabetes Father     • Hypertension Father     • Hypertension Brother     • Obesity Brother     • Diabetes Brother     • Obesity Daughter     • Hypertension Daughter     • Diabetes Daughter       Social History     Socioeconomic History   • Marital status:      Spouse name: Not on file   • Number of " children: Not on file   • Years of education: Not on file   • Highest education level: Not on file   Occupational History   • Not on file   Tobacco Use   • Smoking status: Never   • Smokeless tobacco: Never   Substance and Sexual Activity   • Alcohol use: Never   • Drug use: Never   • Sexual activity: Not on file   Other Topics Concern   • Not on file   Social History Narrative   • Not on file     Social Determinants of Health     Financial Resource Strain: Not on file   Food Insecurity: Not on file   Transportation Needs: Not on file   Physical Activity: Not on file   Stress: Not on file   Social Connections: Not on file   Intimate Partner Violence: Not on file   Housing Stability: Not on file     Allergies   Allergen Reactions   • Atropine Anaphylaxis, Nausea And Vomiting, Palpitations and Rash     HEART RACING   • Codeine Itching and Unknown   • Ezetimibe Unknown   • Iodinated Contrast Media Other     SYNCOPE  NAUSEA  VOMITING   • Iodine Nausea/vomiting   • Lisinopril Cough   • Metformin Hcl Diarrhea   • Simvastatin Unknown   • Adhesive Tape-Silicones Rash     Current Outpatient Medications on File Prior to Visit   Medication Sig Dispense Refill   • amitriptyline (Elavil) 10 mg tablet Take 2 tablets (20 mg) by mouth once daily at bedtime.     • ascorbic acid (Vitamin C) 250 MG chewable tablet Chew 1 tablet (250 mg). Gummies dose unknown     • ascorbic acid-elderberry fruit (Airborne, elderberry,) 100-50 mg tablet,chewable Chew. Dose unknown gummies     • calcium carbonate (Tums) 200 mg calcium chewable tablet Chew 1 tablet (500 mg) once daily.     • CALCIUM CITRATE ORAL Take 500 mg by mouth 2 times a day.     • cholecalciferol (Vitamin D3) 1,250 mcg (50,000 unit) tablet Take 1 capsule by mouth 1 (one) time per week.     • colchicine 0.6 mg tablet Take 1 tablet (0.6 mg) by mouth once daily.     • cyanocobalamin (Vitamin B-12) 1,000 mcg/mL injection Inject into the shoulder, thigh, or buttocks every 30 (thirty)  days.     • cyclobenzaprine (Flexeril) 10 mg tablet Take 1 tablet (10 mg) by mouth as needed at bedtime.     • dapagliflozin propanediol (Farxiga) 10 mg Take 1 tablet (10 mg) by mouth once daily in the morning.     • denosumab (Prolia) 60 mg/mL syringe Inject under the skin. As directed     • glimepiride (Amaryl) 2 mg tablet Take 1 tablet (2 mg) by mouth once daily in the morning. Take before meals.     • Lactobacillus acidophilus (PROBIOTIC ORAL) as directed Orally     • leflunomide (Arava) 10 mg tablet Take 1 tablet (10 mg) by mouth once daily.     • multivit-minerals/folic acid (ADULT ONE DAILY MULTIVITAMIN ORAL) Take by mouth.     • omeprazole (PriLOSEC) 40 mg DR capsule Take 1 capsule (40 mg) by mouth 2 times a day before meals.     • oxybutynin (Ditropan) 5 mg tablet Take 1 tablet (5 mg) by mouth once daily.     • sertraline (Zoloft) 25 mg tablet Take 1 tablet (25 mg) by mouth once daily.     • sucralfate (Carafate) 1 gram tablet Take 1 tablet (1 g) by mouth. ON AN EMPTY STOMACH, CRUSH 3 TIMES DAILY     • sulfaSALAzine (Azulfidine) 500 mg tablet Take 1 tablet (500 mg) by mouth 2 times a day.     • traMADol (Ultram) 50 mg tablet Take 1 tablet (50 mg) by mouth once daily as needed for moderate pain (4 - 6) (OA/RA).     • [DISCONTINUED] atorvastatin (Lipitor) 10 mg tablet Take 0.5 tablets (5 mg) by mouth once daily.     • [DISCONTINUED] levothyroxine (Synthroid, Levoxyl) 50 mcg tablet Take 1 tablet (50 mcg) by mouth once daily in the morning. Take before meals.     • [DISCONTINUED] venlafaxine 37.5 mg 24 hr tablet Take 1 tablet (37.5 mg) by mouth once daily.       No current facility-administered medications on file prior to visit.     Immunization History   Administered Date(s) Administered   • Flu vaccine, quadrivalent, high-dose, preservative free, age 65y+ (FLUZONE) 10/21/2020, 10/21/2021   • Hepatitis B vaccine, adult (RECOMBIVAX, ENGERIX) 10/11/2005, 12/13/2005, 08/25/2006   • Influenza, High Dose Seasonal,  Preservative Free 10/07/2016, 11/14/2018, 01/08/2020   • Influenza, Seasonal, Quadrivalent, Adjuvanted 10/18/2022   • Influenza, injectable, quadrivalent 11/14/2017   • Influenza, seasonal, injectable 11/01/2008, 01/04/2011, 10/19/2011, 10/01/2014   • Influenza, trivalent, adjuvanted 10/05/2020, 10/20/2020, 10/22/2021   • Novel influenza-H1N1-09, preservative-free 01/06/2010   • Pfizer COVID-19 vaccine, bivalent, age 12 years and older (30 mcg/0.3 mL) 10/24/2022   • Pfizer Purple Cap SARS-CoV-2 03/08/2021, 03/29/2021, 10/22/2021   • Pneumococcal conjugate vaccine, 13-valent (PREVNAR 13) 09/01/2015   • Pneumococcal conjugate vaccine, 20-valent (PREVNAR 20) 10/18/2022   • Pneumococcal polysaccharide vaccine, 23-valent, age 2 years and older (PNEUMOVAX 23) 01/01/2002, 01/04/2011   • Tdap vaccine, age 7 year and older (BOOSTRIX) 01/08/2020   • Zoster vaccine, recombinant, adult (SHINGRIX) 11/01/2022   • Zoster, live 09/07/2012     Patient's medical history was reviewed and updated either before or during this encounter.       Tom Lyle MD

## 2023-12-05 NOTE — PATIENT INSTRUCTIONS
Flu vaccine today.    Diagnoses and all orders for this visit:  Benign hypertension  Comments:  BP doing OK.  Orders:  -     Comprehensive Metabolic Panel; Future  Pure hypercholesterolemia  -     Lipid Panel; Future  -     Comprehensive Metabolic Panel; Future  -     Lipid Panel; Future  Type 2 diabetes mellitus with hyperglycemia, without long-term current use of insulin (CMS/[facility])  Comments:  Eye exam yearly. HGA1C goal less than 7% at 7.2% Continue meds.  Orders:  -     Hemoglobin A1C; Future  -     CBC; Future  -     CBC; Future  -     Hemoglobin A1C; Future  -     Albumin , Urine Random; Future  Acquired hypothyroidism  -     TSH with reflex to Free T4 if abnormal; Future  Vitamin B 12 deficiency  -     Vitamin B12; Future  Vitamin D deficiency  Gastroesophageal reflux disease without esophagitis  Comments:  stable.  Intestinal malabsorption, unspecified type  Chronic arthritis  Comments:  Tramadol daily as needed for severe arthritis pain.  Orders:  -     traMADol (Ultram) 50 mg tablet; Take 1 tablet (50 mg) by mouth once daily as needed for moderate pain (4 - 6) (OA/RA).  Anxiety  Comments:  Mood wise doing well.  Other orders  -     Flu vaccine, quadrivalent, high-dose, preservative free, age 65y+ (FLUZONE)

## 2023-12-11 ENCOUNTER — INFUSION (OUTPATIENT)
Dept: INFUSION THERAPY | Facility: CLINIC | Age: 73
End: 2023-12-11
Payer: MEDICARE

## 2023-12-11 VITALS
SYSTOLIC BLOOD PRESSURE: 141 MMHG | TEMPERATURE: 96.4 F | WEIGHT: 162.92 LBS | BODY MASS INDEX: 28.86 KG/M2 | HEART RATE: 80 BPM | RESPIRATION RATE: 17 BRPM | OXYGEN SATURATION: 96 % | DIASTOLIC BLOOD PRESSURE: 68 MMHG

## 2023-12-11 DIAGNOSIS — M06.09 RHEUMATOID ARTHRITIS OF MULTIPLE SITES WITHOUT RHEUMATOID FACTOR (MULTI): ICD-10-CM

## 2023-12-11 PROCEDURE — 96372 THER/PROPH/DIAG INJ SC/IM: CPT | Performed by: NURSE PRACTITIONER

## 2023-12-11 RX ORDER — ALBUTEROL SULFATE 0.83 MG/ML
3 SOLUTION RESPIRATORY (INHALATION) AS NEEDED
Status: CANCELLED | OUTPATIENT
Start: 2024-01-08

## 2023-12-11 RX ORDER — DIPHENHYDRAMINE HYDROCHLORIDE 50 MG/ML
50 INJECTION INTRAMUSCULAR; INTRAVENOUS AS NEEDED
Status: CANCELLED | OUTPATIENT
Start: 2024-01-08

## 2023-12-11 RX ORDER — FAMOTIDINE 10 MG/ML
20 INJECTION INTRAVENOUS ONCE AS NEEDED
Status: CANCELLED | OUTPATIENT
Start: 2024-01-08

## 2023-12-11 RX ORDER — EPINEPHRINE 0.3 MG/.3ML
0.3 INJECTION SUBCUTANEOUS EVERY 5 MIN PRN
Status: CANCELLED | OUTPATIENT
Start: 2024-01-08

## 2023-12-11 ASSESSMENT — PAIN SCALES - GENERAL: PAINLEVEL: 4

## 2023-12-11 NOTE — PROGRESS NOTES
[facility]   infusion Clinic Note   Date: 2023   Name: Heather Thurston  : 1950   MRN: [id number]         Reason for Visit: Injections (Cimzia)         Visit Type: INJECTION      Ordered By: Trino      Accompanied by:Self      Diagnosis: Rheumatoid arthritis of multiple sites without rheumatoid factor (CMS/[facility])       Allergies:   Allergies as of 2023 - Reviewed 2023   Allergen Reaction Noted    Atropine Anaphylaxis, Nausea And Vomiting, Palpitations, and Rash 2023    Codeine Itching and Unknown 2023    Ezetimibe Unknown 2023    Iodinated contrast media Other 2023    Iodine Nausea/vomiting 10/16/2019    Lisinopril Cough 2023    Metformin hcl Diarrhea 2023    Simvastatin Unknown 2023    Adhesive tape-silicones Rash 2023         Current Medications has a current medication list which includes the following prescription(s): amitriptyline, ascorbic acid, ascorbic acid-elderberry fruit, calcium carbonate, calcium citrate, cholecalciferol, colchicine, cyanocobalamin, cyclobenzaprine, dapagliflozin propanediol, prolia, gabapentin, glimepiride, lactobacillus acidophilus, leflunomide, multivit-minerals/folic acid, omeprazole, oxybutynin, sertraline, sucralfate, sulfasalazine, and tramadol.       Vitals:  Vitals:    23 1209   BP: 150/72   Pulse: 96   Resp: 18   Temp: 35.8 °C (96.4 °F)   SpO2: 94%   Weight: 73.9 kg (162 lb 14.7 oz)             Infusion Pre-procedure Checklist:   - Allergies reviewed: yes   - Medications reviewed: yes       - Previous reaction to current treatment: no      Assess patient for the concerns below. Document provider notification as appropriate.  - Active or recent infection with/without current antibiotic use: no  - Recent or planned invasive dental work: no  - Recent or planned surgeries: no  - Recently received or plans to receive vaccinations: no  - Has treatment related toxicities:  no  - Is pregnant:  no      Pain: 5   - Is the pain different from normal: no   - Is the pain tolerable: n/a   - Is your Doctor aware:  n/a      Labs: N/A         Fall Risk Screening:         Review Of Systems:  Review of Systems   All other systems reviewed and are negative.        Infusion Readiness:   - Assessment Concerns Related to Infusion: No  - Provider notified: n/a      Document Below Only If Indicated:   New Patient Education:    N/A (returning patient for continuation of therapy. Ongoing education provided as needed.)        Treatment Conditions & Drug Specific Questions:    NOT APPLICABLE        Weight Based Drug Calculations:    WEIGHT BASED DRUGS: NOT APPLICABLE / FLAT DOSE          Initiated By: Rica Schmidt RN   Time: 12:43 PM     We administered certolizumab pegol.

## 2023-12-11 NOTE — PATIENT INSTRUCTIONS
Today :We administered certolizumab pegol.     For:   1. Rheumatoid arthritis of multiple sites without rheumatoid factor (CMS/[facility])         Your next appointment is due in:  1/8/2024        Please read the  Medication Guide that was given to you and reviewed during todays visit.     (Tell all doctors including dentists that you are taking this medication)     Go to the emergency room or call 911 if:  -You have signs of allergic reaction:   -Rash, hives, itching.   -Swollen, blistered, peeling skin.   -Swelling of face, lips, mouth, tongue or throat.   -Tightness of chest, trouble breathing, swallowing or talking     Call your doctor:  - If IV / injection site gets red, warm, swollen, itchy or leaks fluid or pus.     (Leave dressing on your IV site for at least 2 hours and keep area clean and dry  - If you get sick or have symptoms of infection or are not feeling well for any reason.    (Wash your hands often, stay away from people who are sick)  - If you have side effects from your medication that do not go away or are bothersome.     (Refer to the teaching your nurse gave you for side effects to call your doctor about)    - Common side effects may include:  stuffy nose, headache, feeling tired, muscle aches, upset stomach  - Before receiving any vaccines     - Call the [facility] at   If:  - You get sick, are on antibiotics, have had a recent vaccine, have surgery or dental work and your doctor wants your visit rescheduled.  - You need to cancel and reschedule your visit for any reason. Call at least 2 days before your visit if you need to cancel.   - Your insurance changes before your next visit.    (We will need to get approval from your new insurance. This can take up to two weeks.)     The [facility] is opened Monday thru Friday. We are closed on weekends and holidays.   Voice mail will take your call if the center is closed. If you leave a message please allow 24 hours for a  call back during weekdays. If you leave a message on a weekend/holiday, we will call you back the next business day.

## 2023-12-12 DIAGNOSIS — M81.0 OSTEOPOROSIS, UNSPECIFIED OSTEOPOROSIS TYPE, UNSPECIFIED PATHOLOGICAL FRACTURE PRESENCE: Primary | ICD-10-CM

## 2023-12-12 RX ORDER — ALBUTEROL SULFATE 0.83 MG/ML
3 SOLUTION RESPIRATORY (INHALATION) AS NEEDED
Status: CANCELLED | OUTPATIENT
Start: 2023-12-12

## 2023-12-12 RX ORDER — EPINEPHRINE 0.3 MG/.3ML
0.3 INJECTION SUBCUTANEOUS EVERY 5 MIN PRN
Status: CANCELLED | OUTPATIENT
Start: 2023-12-12

## 2023-12-12 RX ORDER — FAMOTIDINE 10 MG/ML
20 INJECTION INTRAVENOUS ONCE AS NEEDED
Status: CANCELLED | OUTPATIENT
Start: 2023-12-12

## 2023-12-12 RX ORDER — DIPHENHYDRAMINE HYDROCHLORIDE 50 MG/ML
50 INJECTION INTRAMUSCULAR; INTRAVENOUS AS NEEDED
Status: CANCELLED | OUTPATIENT
Start: 2023-12-12

## 2023-12-18 RX ORDER — CYANOCOBALAMIN 1000 UG/ML
1000 INJECTION, SOLUTION INTRAMUSCULAR; SUBCUTANEOUS ONCE
Status: COMPLETED | OUTPATIENT
Start: 2023-12-20 | End: 2024-01-12

## 2023-12-19 ENCOUNTER — DOCUMENTATION (OUTPATIENT)
Dept: INFUSION THERAPY | Facility: CLINIC | Age: 73
End: 2023-12-19
Payer: MEDICARE

## 2023-12-19 NOTE — PROGRESS NOTES
CLINICAL CLEARANCE:    Patient to be scheduled for ( continuation ) of Prolia injections.  For Diagnosis: Osteoporosis  Labs..  CMP / Calcium drawn on: 7/8/2024   Serum Calcium Results: 9.8  Crcl:  82.21 ML/MIN  Calcium and Vitamin D supplement on medication list? Yes   No obvious recent dental work per chart review. Nurse to confirm no dental within past/next 4 weeks at encounter.  Last injection received: 1/22/2024 (if continuation)    Due: 07/23/024    This result meets treatment criteria.

## 2023-12-20 ENCOUNTER — APPOINTMENT (OUTPATIENT)
Dept: SURGERY | Facility: CLINIC | Age: 73
End: 2023-12-20
Payer: MEDICARE

## 2023-12-22 ENCOUNTER — APPOINTMENT (OUTPATIENT)
Dept: INFUSION THERAPY | Facility: CLINIC | Age: 73
End: 2023-12-22
Payer: MEDICARE

## 2023-12-30 DIAGNOSIS — U07.1 COVID-19: Primary | ICD-10-CM

## 2023-12-30 RX ORDER — NIRMATRELVIR AND RITONAVIR 300-100 MG
3 KIT ORAL 2 TIMES DAILY
Qty: 30 TABLET | Refills: 0 | Status: SHIPPED | OUTPATIENT
Start: 2023-12-30 | End: 2024-01-04

## 2024-01-02 ENCOUNTER — TELEPHONE (OUTPATIENT)
Dept: PRIMARY CARE | Facility: CLINIC | Age: 74
End: 2024-01-02
Payer: MEDICARE

## 2024-01-02 DIAGNOSIS — J06.9 UPPER RESPIRATORY TRACT INFECTION, UNSPECIFIED TYPE: Primary | ICD-10-CM

## 2024-01-02 RX ORDER — AZITHROMYCIN 250 MG/1
TABLET, FILM COATED ORAL
Qty: 6 TABLET | Refills: 0 | Status: SHIPPED | OUTPATIENT
Start: 2024-01-02 | End: 2024-01-07

## 2024-01-02 NOTE — TELEPHONE ENCOUNTER
"Pt states she tested positive for COVID 12/30/23 and given Rx for paxlovid.  Pt states she has been spitting up a \"ton\" of green stuff and asking if this is normal and OK.  Please advise.  Ph: [phone]    Still having weakness, cough, mucus, lightheadedness but getting a little better every day.  "

## 2024-01-12 ENCOUNTER — OFFICE VISIT (OUTPATIENT)
Dept: SURGERY | Facility: CLINIC | Age: 74
End: 2024-01-12
Payer: MEDICARE

## 2024-01-12 DIAGNOSIS — E11.65 TYPE 2 DIABETES MELLITUS WITH HYPERGLYCEMIA, WITHOUT LONG-TERM CURRENT USE OF INSULIN (MULTI): ICD-10-CM

## 2024-01-12 DIAGNOSIS — K21.9 GASTROESOPHAGEAL REFLUX DISEASE, UNSPECIFIED WHETHER ESOPHAGITIS PRESENT: Primary | ICD-10-CM

## 2024-01-12 DIAGNOSIS — E53.8 VITAMIN B 12 DEFICIENCY: ICD-10-CM

## 2024-01-12 DIAGNOSIS — K90.9 INTESTINAL MALABSORPTION, UNSPECIFIED TYPE (HHS-HCC): ICD-10-CM

## 2024-01-12 PROCEDURE — 96372 THER/PROPH/DIAG INJ SC/IM: CPT

## 2024-01-12 PROCEDURE — 1126F AMNT PAIN NOTED NONE PRSNT: CPT | Performed by: SURGERY

## 2024-01-12 PROCEDURE — 99214 OFFICE O/P EST MOD 30 MIN: CPT | Performed by: SURGERY

## 2024-01-12 PROCEDURE — 1036F TOBACCO NON-USER: CPT | Performed by: SURGERY

## 2024-01-12 RX ORDER — CYANOCOBALAMIN 1000 UG/ML
1000 INJECTION, SOLUTION INTRAMUSCULAR; SUBCUTANEOUS
Status: DISCONTINUED | OUTPATIENT
Start: 2024-01-13 | End: 2024-04-03

## 2024-01-12 RX ADMIN — CYANOCOBALAMIN 1000 MCG: 1000 INJECTION, SOLUTION INTRAMUSCULAR; SUBCUTANEOUS at 14:20

## 2024-01-12 ASSESSMENT — PAIN SCALES - GENERAL: PAINLEVEL: 0-NO PAIN

## 2024-01-12 NOTE — PROGRESS NOTES
Fuv/ B 12 injection  CONSTITUTIONAL:          Chills No.  Fatigue No.  Fever No.         CARDIOLOGY:          Negative for dizziness, chest pain, palpitations, shortness of breath.         RESPIRATORY:          Sleep Apnea No.  Negative for chest congestion, cough, wheezing.         GASTROENTEROLOGY:          Food Intolerance No.  Abdominal pain No.  Acid reflux No.  Black stools No.  Constipation No.  Diarrhea No.  Loss of appetite no.  Nausea No.  Vomiting No.         UROLOGY:          Negative for dysuria, urinary urgency, kidney stones.         PSYCHOLOGY:          Negative for depression, anxiety, high stress level.

## 2024-01-12 NOTE — H&P
History Of Present Illness  Heather Thurston is a 73 y.o. woman here for a follow up.  She had an adjustable gastric band in 200.  She developed reflux and had food intolerance.  She had removal of the adjustable gastric band, hiatal hernia repair, and conversion to Rory-en-Y gastric bypass in 2018.  She complained of subxiphoid burning relieved with eating prompting ppi.  She had persistent symptoms and was increased to bid ppi.  She occasionally takes carafate with some relief.  She had an EGD in 11/22.  There was no ulcer but the endoscopist was able to retroflex.  Spicy foods, red sauce and onions cause the subxiphoid burning to be worse.  She does not take ASA, NSAIDs or steroids.  She only uses tylenol as needed for arthritis.     Past Medical History  Past Medical History:   Diagnosis Date    Adrenal mass (CMS/HCC)     '05 to '14 no change by CT    Anxiety 08/26/2023    Chronic arthritis     tramadol prn    COVID     CVA (cerebral vascular accident) (CMS/HCC)     DJD (degenerative joint disease)     Fibromyalgia     GERD (gastroesophageal reflux disease)     Gout     HLD (hyperlipidemia)     HTN (hypertension)     htn ecg nsr    Intestinal malabsorption 08/26/2023    Osteoporosis     DEXA OSTEOPOROSIS pROLIA    Overactive bladder 08/26/2023    Personal history of other diseases of the musculoskeletal system and connective tissue     History of rheumatoid arthritis    Plantar fasciitis of left foot     Rheumatoid arthritis of multiple sites without rheumatoid factor (CMS/HCC) 11/08/2023    Thyroid nodule     10/22  ENT  eval neg.       Surgical History  Past Surgical History:   Procedure Laterality Date    EYE SURGERY      LOWER EYELID, LID RAISE    LAPAROSCOPIC GASTRIC BANDING  05/21/2007    LAPAROSCOPIC ADJUSTABLE GASTRIC BAND UTILIZING 11 CM VANGUARD BAND, ENTEROLYSIS ([facility])    OTHER SURGICAL HISTORY  12/19/2022    Knee replacement    OTHER SURGICAL HISTORY  12/19/2022    Carpal tunnel surgery     OTHER SURGICAL HISTORY  12/19/2022    Cholecystectomy    OTHER SURGICAL HISTORY  12/19/2022    Hysterectomy    TOTAL KNEE ARTHROPLASTY      LEFT TOTAL KNEE ARTHROPLASTY (DR. DOUGLAS)    TOTAL KNEE ARTHROPLASTY  2015    RT TKA (DR. HARRIS)        Social History  She reports that she has never smoked. She has never been exposed to tobacco smoke. She has never used smokeless tobacco. She reports that she does not drink alcohol and does not use drugs.    Family History  Family History   Problem Relation Name Age of Onset    Lung cancer Mother      Liver cancer Father      Diabetes Father      Hypertension Father      Hypertension Brother      Obesity Brother      Diabetes Brother      Obesity Daughter      Hypertension Daughter      Diabetes Daughter          Allergies  Atropine, Codeine, Ezetimibe, Iodinated contrast media, Iodine, Lisinopril, Metformin hcl, Simvastatin, and Adhesive tape-silicones    Review of Systems   CONSTITUTIONAL:          Chills No.  Fatigue No.  Fever No.         CARDIOLOGY:          Negative for dizziness, chest pain, palpitations, shortness of breath.         RESPIRATORY:          Sleep Apnea No.  Negative for chest congestion, cough, wheezing.         GASTROENTEROLOGY:          Food Intolerance No.  Abdominal pain No.  Acid reflux No.  Black stools No.  Constipation No.  Diarrhea No.  Loss of appetite no.  Nausea No.  Vomiting No.         UROLOGY:          Negative for dysuria, urinary urgency, kidney stones.         PSYCHOLOGY:          Negative for depression, anxiety, high stress level.     Physical Exam       General Examination:         GENERAL APPEARANCE: alert and oriented x 3, Pleasant and cooperative, No Acute Distress.          HEENT: PERRLA.          NECK: no lymphadenopathy, no thyromegaly, no JVD, normal flexion, normal extension.          HEART: regular rate and rhythm.          LUNGS: clear to auscultation bilaterally.          CHEST: normal shape and expansion.           ABDOMEN: no hernias present, soft and not tender, no guarding, no CVA tenderness.          EXTREMITIES: pulses 2 plus bilaterally, trace bilateral edema, no ulcerations.          SKIN: normal, no rash.          NEUROLOGIC EXAM: CN's II-XII grossly intact, gait normal.          BACK: no CVA tenderness.       Last Recorded Vitals  There were no vitals taken for this visit.    Relevant Results     Procedure:       Pre-Anesthesia Assessment:       - Prior to the procedure, a History and Physical was performed, and        patient medications and allergies were reviewed. The patient's tolerance        of previous anesthesia was also reviewed. The risks and benefits of the        procedure and the sedation options and risks were discussed with the        patient. All questions were answered, and informed consent was obtained.        Prior Anticoagulants: The patient has taken no anticoagulant or        antiplatelet agents. ASA Grade Assessment: III - A patient with severe        systemic disease. After reviewing the risks and benefits, the patient        was deemed in satisfactory condition to undergo the procedure.       After obtaining informed consent, the endoscope was passed under direct        vision. Throughout the procedure, the patient's blood pressure, pulse,        and oxygen saturations were monitored continuously. The endoscope was        introduced through the mouth, and advanced to the jejunum. The upper GI        endoscopy was accomplished without difficulty. The patient tolerated the        procedure well.  Scope In: 7:49:34 AM  Scope Out: 7:53:32 AM  Estimated Blood Loss:  Estimated blood loss: none.  Findings:       The examined esophagus was normal.       The Z-line was irregular and was found 37 cm from the incisors.       Evidence of a Rory-en-Y gastrojejunostomy was found. The gastrojejunal        anastomosis was characterized by healthy appearing mucosa and visible        sutures. This was  traversed. The pouch-to-jejunum limb was characterized        by healthy appearing mucosa. The jejunojejunal anastomosis was        characterized by healthy appearing mucosa. The duodenum-to-jejunum limb        was not examined as it could not be reached. Biopsies were taken with a        cold forceps for histology.       The cardia and gastric fundus were normal on retroflexion.       A small hiatal hernia was present on retroflexion.  Impression:       - Normal esophagus.       - Z-line irregular, 37 cm from the incisors.       - Rory-en-Y gastrojejunostomy with gastrojejunal anastomosis        characterized by healthy appearing mucosa and visible sutures. Biopsied.       - Small hiatal hernia.  Recommendation:       - Continue present medications.       - Resume previous diet.       - Await pathology results.       - Return to my office at appointment to be scheduled.       - Perform ambulatory pH monitoring if symptoms persist after review of        biopsies.       - The findings and recommendations were discussed with the surgeon.       - The findings and recommendations were discussed with the patient.       - Patient has a contact number available for emergencies. The signs and        symptoms of potential delayed complications were discussed with the        patient. Return to normal activities tomorrow. Written discharge        instructions were provided to the patient.     ____________________________  Dr. Leandra Ellsworth MD  11/28/2022 8:07:50 AM  Narrative & Impression   PROCEDURE:         UGI AIR CONTRAST W KUB - CXR  0133  REASON FOR EXAM: GASTRO-ESOPHAGEAL REFLUX DISEASE WITHOUT ESOPHAGITIS     RESULT: CLINICAL HISTORY: Patient has a history of gastric band and removal  and subsequent gastric bypass/K 21.9/stomach pain for couple of years     COMPARISON: Upper GI 10/24/2018     FINDINGS:     The KUB reveals nonobstructive bowel gas pattern. Postsurgical changes of  the abdomen can be seen.      Thin barium was utilized for this study without effervescent crystals.     The patient swallowed barium without difficulty. No laryngeal penetration or  aspiration was identified. The esophageal motility is within normal limits.     No significant strictures, erosions, intrinsic or extrinsic masses can be  identified. No hiatal hernia is identified.     Very minimal gastric reflux can be seen on water siphon test. This was at  the level of the GE junction within the lower esophagus.     The gastric pouch is well opacified. The Rory limb is also opacified. No  definite strictures or filling defects can be seen.     No contrast is identified within the excluded stomach.     Total fluoroscopy time: 105 mary. , images: 4, DAP: 85.53 mGy.     IMPRESSION:     1. Mild gastroesophageal reflux.  2. Otherwise unremarkable study.     [id number]        This report has been produced using speech recognition.     Original Interpreting Physician:   BLANCA BEASLEY MD  Original Transcribed by/Date: PSCB   Mar 15 2022  3:30P  Original Electronically Signed by/Date: BLANCA BEASLEY MD Mar 15 2022  3:30P     Addendum Interpreting Physician:  Addendum Transcribed by/Date: NO ADDENDUM  Addendum Electronically Signed by/Date:        Assessment/Plan   Problem List Items Addressed This Visit             ICD-10-CM       Endocrine/Metabolic    Hyperglycemia due to type 2 diabetes mellitus (CMS/[facility]) E11.65    Vitamin B 12 deficiency E53.8    Relevant Medications    cyanocobalamin (Vitamin B-12) injection 1,000 mcg       Gastrointestinal and Abdominal    Gastroesophageal reflux disease - Primary K21.9    Relevant Orders    Esophageal pH Impedance 24 hours    Intestinal malabsorption K90.9    Relevant Medications    cyanocobalamin (Vitamin B-12) injection 1,000 mcg       Heather continues to be symptomatic with reflux and burning symptoms despite being on bid ppi.  On review of EGD, Dr. Ellsworth was able to retroflex within gastric pouch.   I offered Heather evaluation of her pouch with pH study and EGD to determine if it would be worthwhile and feasible to reduce pouch size without redoing gastrojejunostomy in attempt to eliminate need for bid ppi and alleviate symptoms.         I spent 45 minutes in the professional and overall care of this patient.      Kevin Walker MD

## 2024-01-18 ENCOUNTER — APPOINTMENT (OUTPATIENT)
Dept: INFUSION THERAPY | Facility: CLINIC | Age: 74
End: 2024-01-18
Payer: MEDICARE

## 2024-01-18 ENCOUNTER — INFUSION (OUTPATIENT)
Dept: INFUSION THERAPY | Facility: CLINIC | Age: 74
End: 2024-01-18
Payer: MEDICARE

## 2024-01-18 VITALS
HEART RATE: 74 BPM | SYSTOLIC BLOOD PRESSURE: 95 MMHG | OXYGEN SATURATION: 97 % | WEIGHT: 157 LBS | BODY MASS INDEX: 27.81 KG/M2 | RESPIRATION RATE: 16 BRPM | DIASTOLIC BLOOD PRESSURE: 68 MMHG | TEMPERATURE: 97.6 F

## 2024-01-18 DIAGNOSIS — M06.09 RHEUMATOID ARTHRITIS OF MULTIPLE SITES WITHOUT RHEUMATOID FACTOR (MULTI): Primary | ICD-10-CM

## 2024-01-18 RX ORDER — EPINEPHRINE 0.3 MG/.3ML
0.3 INJECTION SUBCUTANEOUS EVERY 5 MIN PRN
Status: DISCONTINUED | OUTPATIENT
Start: 2024-01-18 | End: 2024-01-18 | Stop reason: HOSPADM

## 2024-01-18 RX ORDER — FAMOTIDINE 10 MG/ML
20 INJECTION INTRAVENOUS ONCE AS NEEDED
Status: CANCELLED | OUTPATIENT
Start: 2024-02-05

## 2024-01-18 RX ORDER — DIPHENHYDRAMINE HYDROCHLORIDE 50 MG/ML
50 INJECTION INTRAMUSCULAR; INTRAVENOUS AS NEEDED
Status: DISCONTINUED | OUTPATIENT
Start: 2024-01-18 | End: 2024-01-18 | Stop reason: HOSPADM

## 2024-01-18 RX ORDER — ALBUTEROL SULFATE 0.83 MG/ML
3 SOLUTION RESPIRATORY (INHALATION) AS NEEDED
Status: DISCONTINUED | OUTPATIENT
Start: 2024-01-18 | End: 2024-01-18 | Stop reason: HOSPADM

## 2024-01-18 RX ORDER — DIPHENHYDRAMINE HYDROCHLORIDE 50 MG/ML
50 INJECTION INTRAMUSCULAR; INTRAVENOUS AS NEEDED
Status: CANCELLED | OUTPATIENT
Start: 2024-02-05

## 2024-01-18 RX ORDER — FAMOTIDINE 10 MG/ML
20 INJECTION INTRAVENOUS ONCE AS NEEDED
Status: DISCONTINUED | OUTPATIENT
Start: 2024-01-18 | End: 2024-01-18 | Stop reason: HOSPADM

## 2024-01-18 RX ORDER — EPINEPHRINE 0.3 MG/.3ML
0.3 INJECTION SUBCUTANEOUS EVERY 5 MIN PRN
Status: CANCELLED | OUTPATIENT
Start: 2024-02-05

## 2024-01-18 RX ORDER — ALBUTEROL SULFATE 0.83 MG/ML
3 SOLUTION RESPIRATORY (INHALATION) AS NEEDED
Status: CANCELLED | OUTPATIENT
Start: 2024-02-05

## 2024-01-18 ASSESSMENT — ENCOUNTER SYMPTOMS
ENDOCRINE NEGATIVE: 1
GASTROINTESTINAL NEGATIVE: 1
HEMATOLOGIC/LYMPHATIC NEGATIVE: 1
SHORTNESS OF BREATH: 1
EXTREMITY WEAKNESS: 1
PSYCHIATRIC NEGATIVE: 1
CARDIOVASCULAR NEGATIVE: 1
ARTHRALGIAS: 1
FATIGUE: 1
EYES NEGATIVE: 1
MYALGIAS: 1

## 2024-01-18 ASSESSMENT — PAIN SCALES - GENERAL: PAINLEVEL: 7

## 2024-01-18 NOTE — PROGRESS NOTES
[facility]   infusion Clinic Note   Date: 2024   Name: Heather Thurston  : 1950   MRN: [id number]         Reason for Visit: No chief complaint on file.         Visit Type: INJECTION      Ordered By: Trino      Accompanied by:Self      Diagnosis: Rheumatoid arthritis of multiple sites without rheumatoid factor (CMS/[facility])       Allergies:   Allergies as of 2024 - Reviewed 2024   Allergen Reaction Noted    Atropine Anaphylaxis, Nausea And Vomiting, Palpitations, and Rash 2023    Codeine Itching and Unknown 2023    Ezetimibe Unknown 2023    Iodinated contrast media Other 2023    Iodine Nausea/vomiting 10/16/2019    Lisinopril Cough 2023    Metformin hcl Diarrhea 2023    Simvastatin Unknown 2023    Adhesive tape-silicones Rash 2023         Current Medications has a current medication list which includes the following prescription(s): amitriptyline, ascorbic acid, ascorbic acid-elderberry fruit, calcium carbonate, calcium citrate, cholecalciferol, colchicine, cyanocobalamin, cyclobenzaprine, dapagliflozin propanediol, prolia, gabapentin, glimepiride, lactobacillus acidophilus, leflunomide, multivit-minerals/folic acid, omeprazole, oxybutynin, sertraline, sucralfate, sulfasalazine, and tramadol, and the following Facility-Administered Medications: albuterol, certolizumab pegol, cyanocobalamin, dextrose, diphenhydramine, epinephrine, famotidine pf, methylprednisolone sod succinate (pf), and sodium chloride.       Vitals:  Vitals:    24 1102   BP: 95/68   BP Location: Left arm   Patient Position: Sitting   BP Cuff Size: Adult   Pulse: 74   Resp: 16   Temp: 36.4 °C (97.6 °F)   TempSrc: Temporal   SpO2: 97%   Weight: 71.2 kg (157 lb)             Infusion Pre-procedure Checklist:   - Allergies reviewed: yes   - Medications reviewed: yes       - Previous reaction to current treatment: no      Assess patient for the  "concerns below. Document provider notification as appropriate.  - Active or recent infection with/without current antibiotic use: no  - Recent or planned invasive dental work: no  - Recent or planned surgeries: no  - Recently received or plans to receive vaccinations: no  - Has treatment related toxicities: no  - Is pregnant:  n/a      Pain: 0   - Is the pain different from normal: n/a   - Is the pain tolerable: n/a   - Is your Doctor aware:  n/a      Labs: N/A         Fall Risk Screening:         Review Of Systems:  Review of Systems   Constitutional:  Positive for fatigue.   HENT:  Negative.     Eyes: Negative.    Respiratory:  Positive for shortness of breath.    Cardiovascular: Negative.    Gastrointestinal: Negative.    Endocrine: Negative.    Genitourinary: Negative.     Musculoskeletal:  Positive for arthralgias and myalgias.   Skin: Negative.    Neurological:  Positive for extremity weakness.   Hematological: Negative.    Psychiatric/Behavioral: Negative.     Had COVID at [place]. Still week and short of breath at times from that episode.      Infusion Readiness:   - Assessment Concerns Related to Infusion: No  - Provider notified: n/a      Document Below Only If Indicated:   New Patient Education:    N/A (returning patient for continuation of therapy. Ongoing education provided as needed.)        Treatment Conditions & Drug Specific Questions:    Certolizumab Pegol  (CIMZIA)    (Unless otherwise specified on patient specific therapy plan):     TREATMENT CONDITIONS:  Unless otherwise specified on patient specific therapy plan HOLD treatment and notify provider prior to proceeding with today's infusion if patient with a:  o Positive Hepatitis B Surface Ag  o Positive T-Spot  o NO baseline CBC-D obtained  o NO baseline CMP obtained    No results found for: \"HAGCN\", \"HEPBSURABI\", \"HBSAG\", \"XHAGF\", \"HEPBSAG\", \"EXTHEPBSAG\", \"NONUHSWGH\"   No results found for: \"NONUHFIRE\", \"NONUHSWGH\", \"NONUHFISH\", " "\"EXTHEPBSAG\"  No results found for: \"TBSIN\", \"TBGRES\", \"QFG\", \"TSPOTR\"   Lab Results   Component Value Date    WBC 5.8 11/22/2022    HGB 11.0 (L) 11/22/2022    HCT 36.2 11/22/2022    MCV 85.0 11/22/2022     11/22/2022        Chemistry    Lab Results   Component Value Date/Time     05/23/2023 1233    K 4.1 05/23/2023 1233     05/23/2023 1233    CO2 29 05/23/2023 1233    BUN 11 05/23/2023 1233    CREATININE 0.8 05/23/2023 1233    Lab Results   Component Value Date/Time    CALCIUM 10.0 05/23/2023 1233    ALKPHOS 56 07/21/2022 1116    AST 20 07/21/2022 1116    ALT 10 07/21/2022 1116    BILITOT 0.2 07/21/2022 1116             Labs reviewed and patient meets treatment conditions? No    DRUG SPECIFIC QUESTIONS:  - Latex Allergy? No     (PFS may include Latex)    - History of heart failure? No     (may exacerbate)      REMINDERS:  Recommended Vitals/Observation:  Vitals: Obtain vitals prior to injection.  Observation: Patient may leave immediately following injection.        Weight Based Drug Calculations:    WEIGHT BASED DRUGS: NOT APPLICABLE / FLAT DOSE          Initiated By: ROD Pemberton-CNP   Time: 11:34 AM     Heather Thurston had no medications administered during this visit.  Tolerated well. RTC 1 month.     "

## 2024-01-22 ENCOUNTER — INFUSION (OUTPATIENT)
Dept: INFUSION THERAPY | Facility: CLINIC | Age: 74
End: 2024-01-22
Payer: MEDICARE

## 2024-01-22 VITALS
TEMPERATURE: 97.2 F | HEART RATE: 96 BPM | WEIGHT: 157 LBS | BODY MASS INDEX: 27.81 KG/M2 | RESPIRATION RATE: 16 BRPM | DIASTOLIC BLOOD PRESSURE: 58 MMHG | SYSTOLIC BLOOD PRESSURE: 103 MMHG

## 2024-01-22 DIAGNOSIS — M81.0 OSTEOPOROSIS, UNSPECIFIED OSTEOPOROSIS TYPE, UNSPECIFIED PATHOLOGICAL FRACTURE PRESENCE: ICD-10-CM

## 2024-01-22 PROCEDURE — 96372 THER/PROPH/DIAG INJ SC/IM: CPT | Performed by: NURSE PRACTITIONER

## 2024-01-22 RX ORDER — DIPHENHYDRAMINE HYDROCHLORIDE 50 MG/ML
50 INJECTION INTRAMUSCULAR; INTRAVENOUS AS NEEDED
OUTPATIENT
Start: 2024-07-20

## 2024-01-22 RX ORDER — FAMOTIDINE 10 MG/ML
20 INJECTION INTRAVENOUS ONCE AS NEEDED
OUTPATIENT
Start: 2024-07-20

## 2024-01-22 RX ORDER — EPINEPHRINE 0.3 MG/.3ML
0.3 INJECTION SUBCUTANEOUS EVERY 5 MIN PRN
OUTPATIENT
Start: 2024-07-20

## 2024-01-22 RX ORDER — ALBUTEROL SULFATE 0.83 MG/ML
3 SOLUTION RESPIRATORY (INHALATION) AS NEEDED
OUTPATIENT
Start: 2024-07-20

## 2024-01-22 ASSESSMENT — PAIN SCALES - GENERAL: PAINLEVEL: 7

## 2024-01-22 NOTE — PATIENT INSTRUCTIONS
Today :We administered denosumab.     For:   1. Osteoporosis, unspecified osteoporosis type, unspecified pathological fracture presence         Your next appointment is due in:  6 months. You will need new orders, Dr Jameson's orders will no longer be valid.        Please read the  Medication Guide that was given to you and reviewed during todays visit.     (Tell all doctors including dentists that you are taking this medication)     Go to the emergency room or call 911 if:  -You have signs of allergic reaction:   -Rash, hives, itching.   -Swollen, blistered, peeling skin.   -Swelling of face, lips, mouth, tongue or throat.   -Tightness of chest, trouble breathing, swallowing or talking     Call your doctor:  - If IV / injection site gets red, warm, swollen, itchy or leaks fluid or pus.     (Leave dressing on your IV site for at least 2 hours and keep area clean and dry  - If you get sick or have symptoms of infection or are not feeling well for any reason.    (Wash your hands often, stay away from people who are sick)  - If you have side effects from your medication that do not go away or are bothersome.     (Refer to the teaching your nurse gave you for side effects to call your doctor about)    - Common side effects may include:  stuffy nose, headache, feeling tired, muscle aches, upset stomach  - Before receiving any vaccines     - Call the [facility] at   If:  - You get sick, are on antibiotics, have had a recent vaccine, have surgery or dental work and your doctor wants your visit rescheduled.  - You need to cancel and reschedule your visit for any reason. Call at least 2 days before your visit if you need to cancel.   - Your insurance changes before your next visit.    (We will need to get approval from your new insurance. This can take up to two weeks.)     The [facility] is opened Monday thru Friday. We are closed on weekends and holidays.   Voice mail will take your call if  the center is closed. If you leave a message please allow 24 hours for a call back during weekdays. If you leave a message on a weekend/holiday, we will call you back the next business day.

## 2024-01-22 NOTE — PROGRESS NOTES
[facility]   infusion Clinic Note   Date: 2024   Name: Heather Thurston  : 1950   MRN: [id number]         Reason for Visit: Injections (prolia)         Visit Type: INJECTION      Ordered By: Dr Jameson      Accompanied by:Self      Diagnosis: Osteoporosis, unspecified osteoporosis type, unspecified pathological fracture presence       Allergies:   Allergies as of 2024 - Reviewed 2024   Allergen Reaction Noted    Atropine Anaphylaxis, Nausea And Vomiting, Palpitations, and Rash 2023    Codeine Itching and Unknown 2023    Ezetimibe Unknown 2023    Iodinated contrast media Other 2023    Iodine Nausea/vomiting 10/16/2019    Lisinopril Cough 2023    Metformin hcl Diarrhea 2023    Simvastatin Unknown 2023    Adhesive tape-silicones Rash 2023         Current Medications has a current medication list which includes the following prescription(s): amitriptyline, ascorbic acid, ascorbic acid-elderberry fruit, calcium carbonate, calcium citrate, cholecalciferol, colchicine, cyanocobalamin, cyclobenzaprine, dapagliflozin propanediol, prolia, gabapentin, glimepiride, lactobacillus acidophilus, leflunomide, multivit-minerals/folic acid, omeprazole, oxybutynin, sertraline, sucralfate, sulfasalazine, and tramadol, and the following Facility-Administered Medications: cyanocobalamin.       Vitals:  Vitals:    24 1205   BP: 103/58   BP Location: Left arm   Patient Position: Sitting   BP Cuff Size: Adult   Pulse: 96   Resp: 16   Temp: 36.2 °C (97.2 °F)   TempSrc: Temporal   Weight: 71.2 kg (157 lb)             Infusion Pre-procedure Checklist:   - Allergies reviewed: yes   - Medications reviewed: yes       - Previous reaction to current treatment: no      Assess patient for the concerns below. Document provider notification as appropriate.  - Active or recent infection with/without current antibiotic use: no  - Recent or planned  "invasive dental work: no  - Recent or planned surgeries: no  - Recently received or plans to receive vaccinations: no  - Has treatment related toxicities: no  - Is pregnant:  n/a      Pain: 0   - Is the pain different from normal: no   - Is the pain tolerable: n/a   - Is your Doctor aware:  n/a      Labs:  Reviewed         Fall Risk Screening: Derik Fall Risk  History of Falling, Immediate or Within 3 Months: Yes  Secondary Diagnosis: No  Ambulatory Aid: Walks without aid/bedrest/nurse assist  Intravenous Therapy/Heparin Lock: No  Gait/Transferring: Normal/bedrest/immobile  Mental Status: Oriented to own ability  More Fall Risk Score: 25       Review Of Systems:  Review of Systems   All other systems reviewed and are negative.        Infusion Readiness:   - Assessment Concerns Related to Infusion: No  - Provider notified: n/a      Document Below Only If Indicated:   New Patient Education:    N/A (returning patient for continuation of therapy. Ongoing education provided as needed.)        Treatment Conditions & Drug Specific Questions:    Denosumab  (PROLIA. XGEVA)    (Unless otherwise specified on patient specific therapy plan):     TREATMENT CONDITIONS:  Unless otherwise specified on patient specific therapy plan HOLD and notify provider prior to proceeding with today's injection if patients:  o Calcium is LESS THAN 8.6 mg/dL OR  Ionized Calcium LESS THAN 1.1 mmol/L  o Recent or planned invasive dental procedure (within 4 weeks)    Lab Results   Component Value Date    CALCIUM 10.0 05/23/2023    PHOS 3.7 07/15/2020      No results found for: \"NONUHFIRE\", \"IONCA\", \"CCAIO\", \"IONCALVEN\"     Labs reviewed and patient meets treatment conditions? Yes    DRUG SPECIFIC QUESTIONS:  Is the patient taking calcium and vitamin D? Yes  (Recommended)    Pt Instructed on following risks: (1) hypocalcemia, (2) osteonecrosis of the jaw, (3) atypical femoral fractures, (4) serious infections, and (5) dermatologic reactions?  " Yes      REMINDER:  PREGNANCY CATEGORY X DRUG. OBTAIN NEGTATIVE PREGNANCY TEST PRIOR TO FIRST INFUSION FOR WOMEN OF CHILDBEARING ABILITY   REMS DRUG    Recommended Vitals/Observation:  Vitals: Obtain vitals prior to injection.  Observation: Patient may leave immediately following injection.        Weight Based Drug Calculations:    WEIGHT BASED DRUGS: NOT APPLICABLE / FLAT DOSE          Initiated By: Lashawn Jeffrey RN   Time: 12:45 PM     We administered denosumab.

## 2024-02-06 DIAGNOSIS — N32.81 OVERACTIVE BLADDER: ICD-10-CM

## 2024-02-07 RX ORDER — OXYBUTYNIN CHLORIDE 5 MG/1
5 TABLET ORAL DAILY
Qty: 90 TABLET | Refills: 2 | Status: SHIPPED | OUTPATIENT
Start: 2024-02-07

## 2024-02-08 ENCOUNTER — TELEPHONE (OUTPATIENT)
Dept: PRIMARY CARE | Facility: CLINIC | Age: 74
End: 2024-02-08
Payer: MEDICARE

## 2024-02-08 NOTE — TELEPHONE ENCOUNTER
"REQUESTING TO SPEAK WITH THE DOCTOR. STATED THAT YESTERDAY SHE WAS IN THE [facility] AND AND HAD SYNCOPE EPISODE AND ALMOST PASSED OUT. STATED THAT SHE IS NOT FEELING WELL AND THINKS IT HAS TO DO WITH TAKING 4 DIFFERENT \"SUGAR PILLS\" OFFERED AN APPOINTMENT BUT DECLINED ASKED FOR A PHONE CALL          "

## 2024-02-08 NOTE — TELEPHONE ENCOUNTER
Glimepiride 2mg 1/2 tablet daily to 1mg, Farxiga 10 mg , push fluids, check BS every day, make follow appt. check blood sugar readings every AM.

## 2024-02-14 ENCOUNTER — HOSPITAL ENCOUNTER (OUTPATIENT)
Dept: GASTROENTEROLOGY | Facility: HOSPITAL | Age: 74
Setting detail: OUTPATIENT SURGERY
Discharge: HOME | End: 2024-02-14
Payer: MEDICARE

## 2024-02-14 VITALS
DIASTOLIC BLOOD PRESSURE: 76 MMHG | HEART RATE: 99 BPM | OXYGEN SATURATION: 97 % | SYSTOLIC BLOOD PRESSURE: 154 MMHG | RESPIRATION RATE: 16 BRPM

## 2024-02-14 DIAGNOSIS — K21.9 GASTROESOPHAGEAL REFLUX DISEASE, UNSPECIFIED WHETHER ESOPHAGITIS PRESENT: ICD-10-CM

## 2024-02-14 PROCEDURE — 2500000005 HC RX 250 GENERAL PHARMACY W/O HCPCS: Performed by: SURGERY

## 2024-02-14 PROCEDURE — 2720000007 HC OR 272 NO HCPCS

## 2024-02-14 RX ORDER — LIDOCAINE HYDROCHLORIDE 20 MG/ML
1 JELLY TOPICAL ONCE
Status: CANCELLED | OUTPATIENT
Start: 2024-02-14

## 2024-02-14 RX ORDER — LIDOCAINE HYDROCHLORIDE 20 MG/ML
1 JELLY TOPICAL ONCE
Status: COMPLETED | OUTPATIENT
Start: 2024-02-14 | End: 2024-02-14

## 2024-02-14 RX ADMIN — LIDOCAINE HYDROCHLORIDE 1 APPLICATION: 20 JELLY TOPICAL at 14:00

## 2024-02-15 ENCOUNTER — INFUSION (OUTPATIENT)
Dept: INFUSION THERAPY | Facility: CLINIC | Age: 74
End: 2024-02-15
Payer: MEDICARE

## 2024-02-15 ENCOUNTER — APPOINTMENT (OUTPATIENT)
Dept: INFUSION THERAPY | Facility: CLINIC | Age: 74
End: 2024-02-15
Payer: MEDICARE

## 2024-02-15 VITALS — SYSTOLIC BLOOD PRESSURE: 122 MMHG | RESPIRATION RATE: 19 BRPM | HEART RATE: 96 BPM | DIASTOLIC BLOOD PRESSURE: 67 MMHG

## 2024-02-15 DIAGNOSIS — K21.9 GASTROESOPHAGEAL REFLUX DISEASE, UNSPECIFIED WHETHER ESOPHAGITIS PRESENT: ICD-10-CM

## 2024-02-15 DIAGNOSIS — M06.09 RHEUMATOID ARTHRITIS OF MULTIPLE SITES WITHOUT RHEUMATOID FACTOR (MULTI): ICD-10-CM

## 2024-02-15 PROCEDURE — 96372 THER/PROPH/DIAG INJ SC/IM: CPT | Performed by: NURSE PRACTITIONER

## 2024-02-15 RX ORDER — ALBUTEROL SULFATE 0.83 MG/ML
3 SOLUTION RESPIRATORY (INHALATION) AS NEEDED
Status: CANCELLED | OUTPATIENT
Start: 2024-03-14

## 2024-02-15 RX ORDER — FAMOTIDINE 10 MG/ML
20 INJECTION INTRAVENOUS ONCE AS NEEDED
Status: CANCELLED | OUTPATIENT
Start: 2024-03-14

## 2024-02-15 RX ORDER — EPINEPHRINE 0.3 MG/.3ML
0.3 INJECTION SUBCUTANEOUS EVERY 5 MIN PRN
Status: CANCELLED | OUTPATIENT
Start: 2024-03-14

## 2024-02-15 RX ORDER — DIPHENHYDRAMINE HYDROCHLORIDE 50 MG/ML
50 INJECTION INTRAMUSCULAR; INTRAVENOUS AS NEEDED
Status: CANCELLED | OUTPATIENT
Start: 2024-03-14

## 2024-02-15 ASSESSMENT — ENCOUNTER SYMPTOMS
BACK PAIN: 1
NECK PAIN: 1

## 2024-02-15 ASSESSMENT — PAIN SCALES - GENERAL: PAINLEVEL: 8

## 2024-02-15 NOTE — PROGRESS NOTES
[facility]   infusion Clinic Note   Date: February 15, 2024   Name: Heather Thurston  : 1950   MRN: [id number]         Reason for Visit: Injections (Cimzia)         Visit Type: INJECTION       Ordered By: Trino      Accompanied by:Self      Diagnosis: Rheumatoid arthritis of multiple sites without rheumatoid factor (CMS/[facility])       Allergies:   Allergies as of 02/15/2024 - Reviewed 2024   Allergen Reaction Noted    Atropine Anaphylaxis, Nausea And Vomiting, Palpitations, and Rash 2023    Codeine Itching and Unknown 2023    Ezetimibe Unknown 2023    Iodinated contrast media Other 2023    Iodine Nausea/vomiting 10/16/2019    Lisinopril Cough 2023    Metformin hcl Diarrhea 2023    Simvastatin Unknown 2023    Adhesive tape-silicones Rash 2023         Current Medications has a current medication list which includes the following prescription(s): amitriptyline, ascorbic acid, ascorbic acid-elderberry fruit, calcium carbonate, calcium citrate, cholecalciferol, colchicine, cyanocobalamin, cyclobenzaprine, dapagliflozin propanediol, prolia, gabapentin, glimepiride, lactobacillus acidophilus, leflunomide, multivit-minerals/folic acid, omeprazole, oxybutynin, sertraline, sucralfate, sulfasalazine, and tramadol, and the following Facility-Administered Medications: cyanocobalamin.       Vitals:   There were no vitals filed for this visit.          Infusion Pre-procedure Checklist:   - Allergies reviewed: yes   - Medications reviewed: yes       - Previous reaction to current treatment: no      Assess patient for the concerns below. Document provider notification as appropriate.  - Active or recent infection with/without current antibiotic use: no  - Recent or planned invasive dental work: no  - Recent or planned surgeries: no  - Recently received or plans to receive vaccinations: no  - Has treatment related toxicities: no  - Is pregnant:  no     "  Pain: 7   - Is the pain different from normal: yes   - Is the pain tolerable: yes   - Is your Doctor aware:  no      Labs: N/A         Fall Risk Screening:         Review Of Systems:  Review of Systems   Musculoskeletal:  Positive for back pain and neck pain.         Infusion Readiness:   - Assessment Concerns Related to Infusion: No  - Provider notified: no      Document Below Only If Indicated:   New Patient Education:    N/A (returning patient for continuation of therapy. Ongoing education provided as needed.)        Treatment Conditions & Drug Specific Questions:    Certolizumab Pegol  (CIMZIA)    (Unless otherwise specified on patient specific therapy plan):     TREATMENT CONDITIONS:  Unless otherwise specified on patient specific therapy plan HOLD treatment and notify provider prior to proceeding with today's infusion if patient with a:  o Positive Hepatitis B Surface Ag  o Positive T-Spot  o NO baseline CBC-D obtained  o NO baseline CMP obtained    No results found for: \"HAGCN\", \"HEPBSURABI\", \"HBSAG\", \"XHAGF\", \"HEPBSAG\", \"EXTHEPBSAG\", \"NONUHSWGH\"   No results found for: \"NONUHFIRE\", \"NONUHSWGH\", \"NONUHFISH\", \"EXTHEPBSAG\"  No results found for: \"TBSIN\", \"TBGRES\", \"QFG\", \"TSPOTR\"   Lab Results   Component Value Date    WBC 5.8 11/22/2022    HGB 11.0 (L) 11/22/2022    HCT 36.2 11/22/2022    MCV 85.0 11/22/2022     11/22/2022        Chemistry    Lab Results   Component Value Date/Time     05/23/2023 1233    K 4.1 05/23/2023 1233     05/23/2023 1233    CO2 29 05/23/2023 1233    BUN 11 05/23/2023 1233    CREATININE 0.8 05/23/2023 1233    Lab Results   Component Value Date/Time    CALCIUM 10.0 05/23/2023 1233    ALKPHOS 56 07/21/2022 1116    AST 20 07/21/2022 1116    ALT 10 07/21/2022 1116    BILITOT 0.2 07/21/2022 1116             Labs reviewed and patient meets treatment conditions? Yes    DRUG SPECIFIC QUESTIONS:  - Latex Allergy? No     (PFS may include Latex)    - History of heart failure? " No     (may exacerbate)      REMINDERS:  Recommended Vitals/Observation:  Vitals: Obtain vitals prior to injection.  Observation: Patient may leave immediately following injection.        Weight Based Drug Calculations:    WEIGHT BASED DRUGS: NOT APPLICABLE / FLAT DOSE          Initiated By: Rica Schmidt RN   Time: 11:01 AM     We administered certolizumab pegol.Patient visit conducted today by Rica Schmidt RN for administration of Cimzia Subcutaneous injection administered in  Abd  and well-tolerated.

## 2024-02-15 NOTE — PATIENT INSTRUCTIONS
Today :Heather Thurston had no medications administered during this visit.     For:   1. Rheumatoid arthritis of multiple sites without rheumatoid factor (CMS/[facility])         Your next appointment is due in:  3/15/24     Please read the  Medication Guide that was given to you and reviewed during todays visit.     (Tell all doctors including dentists that you are taking this medication)     Go to the emergency room or call 911 if:  -You have signs of allergic reaction:   -Rash, hives, itching.   -Swollen, blistered, peeling skin.   -Swelling of face, lips, mouth, tongue or throat.   -Tightness of chest, trouble breathing, swallowing or talking     Call your doctor:  - If IV / injection site gets red, warm, swollen, itchy or leaks fluid or pus.     (Leave dressing on your IV site for at least 2 hours and keep area clean and dry  - If you get sick or have symptoms of infection or are not feeling well for any reason.    (Wash your hands often, stay away from people who are sick)  - If you have side effects from your medication that do not go away or are bothersome.     (Refer to the teaching your nurse gave you for side effects to call your doctor about)    - Common side effects may include:  stuffy nose, headache, feeling tired, muscle aches, upset stomach  - Before receiving any vaccines     - [facility] at   If:  - You get sick, are on antibiotics, have had a recent vaccine, have surgery or dental work and your doctor wants your visit rescheduled.  - You need to cancel and reschedule your visit for any reason. Call at least 2 days before your visit if you need to cancel.   - Your insurance changes before your next visit.    (We will need to get approval from your new insurance. This can take up to two weeks.)     [facility] is opened Monday thru Friday. We are closed on weekends and holidays.   Voice mail will take your call if the center is closed. If you leave a message  please allow 24 hours for a call back during weekdays. If you leave a message on a weekend/holiday, we will call you back the next business day.

## 2024-02-26 ENCOUNTER — TELEPHONE (OUTPATIENT)
Dept: PRIMARY CARE | Facility: CLINIC | Age: 74
End: 2024-02-26
Payer: MEDICARE

## 2024-02-26 NOTE — TELEPHONE ENCOUNTER
Pt c/o headache, weakness, sore throat, green mucus x1 week.  COVID test today negative.  Requesting Rx or appt.  Please advise.  Ph: [phone]    If Rx, Connie [address] [place] [address]

## 2024-02-29 ENCOUNTER — OFFICE VISIT (OUTPATIENT)
Dept: PRIMARY CARE | Facility: CLINIC | Age: 74
End: 2024-02-29
Payer: MEDICARE

## 2024-02-29 ENCOUNTER — APPOINTMENT (OUTPATIENT)
Dept: GASTROENTEROLOGY | Facility: HOSPITAL | Age: 74
End: 2024-02-29
Payer: MEDICARE

## 2024-02-29 VITALS
TEMPERATURE: 98.1 F | SYSTOLIC BLOOD PRESSURE: 134 MMHG | DIASTOLIC BLOOD PRESSURE: 66 MMHG | HEIGHT: 63 IN | WEIGHT: 158 LBS | BODY MASS INDEX: 28 KG/M2 | HEART RATE: 91 BPM | OXYGEN SATURATION: 97 %

## 2024-02-29 DIAGNOSIS — J32.9 SINUSITIS, UNSPECIFIED CHRONICITY, UNSPECIFIED LOCATION: Primary | ICD-10-CM

## 2024-02-29 DIAGNOSIS — K21.9 GASTRO-ESOPHAGEAL REFLUX DISEASE WITHOUT ESOPHAGITIS: ICD-10-CM

## 2024-02-29 PROBLEM — G89.4 CHRONIC PAIN SYNDROME: Status: ACTIVE | Noted: 2024-02-29

## 2024-02-29 PROBLEM — E83.52 HYPERCALCEMIA: Status: ACTIVE | Noted: 2024-02-29

## 2024-02-29 PROBLEM — G62.9 POLYNEUROPATHY, UNSPECIFIED: Status: ACTIVE | Noted: 2024-02-29

## 2024-02-29 PROBLEM — R10.13 EPIGASTRIC PAIN: Status: RESOLVED | Noted: 2022-11-22 | Resolved: 2024-02-29

## 2024-02-29 PROBLEM — R49.0 HOARSENESS: Status: RESOLVED | Noted: 2024-02-29 | Resolved: 2024-02-29

## 2024-02-29 PROBLEM — M79.2 NEURALGIA AND NEURITIS, UNSPECIFIED: Status: ACTIVE | Noted: 2024-02-29

## 2024-02-29 PROBLEM — E11.65 TYPE 2 DIABETES MELLITUS WITH HYPERGLYCEMIA (MULTI): Status: ACTIVE | Noted: 2023-05-23

## 2024-02-29 PROBLEM — I69.851: Status: ACTIVE | Noted: 2022-11-28

## 2024-02-29 PROBLEM — L65.9 NONSCARRING HAIR LOSS, UNSPECIFIED: Status: ACTIVE | Noted: 2024-02-29

## 2024-02-29 PROBLEM — R13.10 DYSPHAGIA: Status: ACTIVE | Noted: 2024-02-29

## 2024-02-29 PROBLEM — Z98.84 HISTORY OF GASTRIC BYPASS: Status: ACTIVE | Noted: 2023-08-26

## 2024-02-29 PROBLEM — K75.9 HEPATITIS: Status: ACTIVE | Noted: 2024-02-29

## 2024-02-29 PROBLEM — K44.9 DIAPHRAGMATIC HERNIA WITHOUT OBSTRUCTION OR GANGRENE: Status: ACTIVE | Noted: 2022-11-28

## 2024-02-29 PROCEDURE — 3075F SYST BP GE 130 - 139MM HG: CPT | Performed by: LICENSED PRACTICAL NURSE

## 2024-02-29 PROCEDURE — 99213 OFFICE O/P EST LOW 20 MIN: CPT | Performed by: LICENSED PRACTICAL NURSE

## 2024-02-29 PROCEDURE — 1159F MED LIST DOCD IN RCRD: CPT | Performed by: LICENSED PRACTICAL NURSE

## 2024-02-29 PROCEDURE — 1125F AMNT PAIN NOTED PAIN PRSNT: CPT | Performed by: LICENSED PRACTICAL NURSE

## 2024-02-29 PROCEDURE — 1160F RVW MEDS BY RX/DR IN RCRD: CPT | Performed by: LICENSED PRACTICAL NURSE

## 2024-02-29 PROCEDURE — 3078F DIAST BP <80 MM HG: CPT | Performed by: LICENSED PRACTICAL NURSE

## 2024-02-29 PROCEDURE — 1036F TOBACCO NON-USER: CPT | Performed by: LICENSED PRACTICAL NURSE

## 2024-02-29 RX ORDER — AMOXICILLIN AND CLAVULANATE POTASSIUM 875; 125 MG/1; MG/1
875 TABLET, FILM COATED ORAL 2 TIMES DAILY
Qty: 20 TABLET | Refills: 0 | Status: SHIPPED | OUTPATIENT
Start: 2024-02-29 | End: 2024-03-10

## 2024-02-29 ASSESSMENT — PATIENT HEALTH QUESTIONNAIRE - PHQ9
2. FEELING DOWN, DEPRESSED OR HOPELESS: NOT AT ALL
1. LITTLE INTEREST OR PLEASURE IN DOING THINGS: NOT AT ALL
SUM OF ALL RESPONSES TO PHQ9 QUESTIONS 1 AND 2: 0

## 2024-02-29 ASSESSMENT — LIFESTYLE VARIABLES
HOW OFTEN DO YOU HAVE SIX OR MORE DRINKS ON ONE OCCASION: NEVER
HOW OFTEN DURING THE LAST YEAR HAVE YOU HAD A FEELING OF GUILT OR REMORSE AFTER DRINKING: NEVER
AUDIT TOTAL SCORE: 0
HAVE YOU OR SOMEONE ELSE BEEN INJURED AS A RESULT OF YOUR DRINKING: NO
HOW OFTEN DURING THE LAST YEAR HAVE YOU FOUND THAT YOU WERE NOT ABLE TO STOP DRINKING ONCE YOU HAD STARTED: NEVER
HOW MANY STANDARD DRINKS CONTAINING ALCOHOL DO YOU HAVE ON A TYPICAL DAY: PATIENT DOES NOT DRINK
HOW OFTEN DURING THE LAST YEAR HAVE YOU FAILED TO DO WHAT WAS NORMALLY EXPECTED FROM YOU BECAUSE OF DRINKING: NEVER
SKIP TO QUESTIONS 9-10: 1
AUDIT-C TOTAL SCORE: 0
HAS A RELATIVE, FRIEND, DOCTOR, OR ANOTHER HEALTH PROFESSIONAL EXPRESSED CONCERN ABOUT YOUR DRINKING OR SUGGESTED YOU CUT DOWN: NO
HOW OFTEN DO YOU HAVE A DRINK CONTAINING ALCOHOL: NEVER
HOW OFTEN DURING THE LAST YEAR HAVE YOU BEEN UNABLE TO REMEMBER WHAT HAPPENED THE NIGHT BEFORE BECAUSE YOU HAD BEEN DRINKING: NEVER
HOW OFTEN DURING THE LAST YEAR HAVE YOU NEEDED AN ALCOHOLIC DRINK FIRST THING IN THE MORNING TO GET YOURSELF GOING AFTER A NIGHT OF HEAVY DRINKING: NEVER

## 2024-02-29 ASSESSMENT — PAIN SCALES - GENERAL: PAINLEVEL: 5

## 2024-02-29 ASSESSMENT — ENCOUNTER SYMPTOMS
OCCASIONAL FEELINGS OF UNSTEADINESS: 0
LOSS OF SENSATION IN FEET: 0
RHINORRHEA: 1
SINUS PRESSURE: 1
HEADACHES: 1
DEPRESSION: 0

## 2024-02-29 NOTE — PROGRESS NOTES
[facility]: [facility]  PROGRESS NOTE      Heather Thurston is a 74 y.o. female that is presenting today for URI.      Subjective   Pt presents to the office today for concerns of sinus symptoms x 3 weeks. She shares that she has been experiencing very purulent nasal drainage, post nasal drip, HA, ear and sinus pressure during this time. She reports resolved fevers and SOB that ended after her first week. She denies chills or a cough.       Review of Systems   HENT:  Positive for postnasal drip, rhinorrhea and sinus pressure.    Neurological:  Positive for headaches.      Objective   Vitals:    02/29/24 1558   BP: 134/66   Pulse: 91   Temp: 36.7 °C (98.1 °F)   SpO2: 97%      Body mass index is 27.99 kg/m².  Physical Exam  HENT:      Nose: Congestion present.      Right Sinus: Maxillary sinus tenderness present.      Left Sinus: Maxillary sinus tenderness present.   Cardiovascular:      Rate and Rhythm: Normal rate and regular rhythm.   Pulmonary:      Effort: Pulmonary effort is normal. No respiratory distress.      Breath sounds: Normal breath sounds. No wheezing or rales.   Skin:     General: Skin is warm and dry.       Diagnostic Results   Lab Results   Component Value Date    GLUCOSE 118 (H) 05/23/2023    CALCIUM 10.0 05/23/2023     05/23/2023    K 4.1 05/23/2023    CO2 29 05/23/2023     05/23/2023    BUN 11 05/23/2023    CREATININE 0.8 05/23/2023     Lab Results   Component Value Date    ALT 10 07/21/2022    AST 20 07/21/2022    ALKPHOS 56 07/21/2022    BILITOT 0.2 07/21/2022     Lab Results   Component Value Date    WBC 5.8 11/22/2022    HGB 11.0 (L) 11/22/2022    HCT 36.2 11/22/2022    MCV 85.0 11/22/2022     11/22/2022     Lab Results   Component Value Date    CHOL 164 07/21/2022    CHOL 150 07/22/2021    CHOL 116 (L) 07/15/2020     Lab Results   Component Value Date    HDL 59 07/21/2022    HDL 63 07/22/2021    HDL 57 07/15/2020     Lab Results   Component Value  "Date    LDLCALC 91 07/21/2022    LDLCALC 74 07/22/2021    LDLCALC 48 (L) 07/15/2020     Lab Results   Component Value Date    TRIG 70 07/21/2022    TRIG 66 07/22/2021    TRIG 55 07/15/2020     No components found for: \"CHOLHDL\"  Lab Results   Component Value Date    HGBA1C 7.2 (A) 12/05/2023     Other labs not included in the list above were reviewed either before or during this encounter.    History    Past Medical History:   Diagnosis Date    Adrenal mass (CMS/HCC)     '05 to '14 no change by CT    Anxiety 08/26/2023    Chronic arthritis     tramadol prn    COVID     CVA (cerebral vascular accident) (CMS/[facility])     DJD (degenerative joint disease)     Epigastric pain 11/22/2022    Fibromyalgia     GERD (gastroesophageal reflux disease)     Gout     HLD (hyperlipidemia)     HTN (hypertension)     htn ecg nsr    Intestinal malabsorption 08/26/2023    Osteoporosis     DEXA OSTEOPOROSIS pROLIA    Overactive bladder 08/26/2023    Personal history of other diseases of the musculoskeletal system and connective tissue     History of rheumatoid arthritis    Plantar fasciitis of left foot     Rheumatoid arthritis of multiple sites without rheumatoid factor (CMS/HCC) 11/08/2023    Sinusitis 02/29/2024    Thyroid nodule     10/22  ENT  eval neg.     Past Surgical History:   Procedure Laterality Date    EYE SURGERY      LOWER EYELID, LID RAISE    LAPAROSCOPIC GASTRIC BANDING  05/21/2007    LAPAROSCOPIC ADJUSTABLE GASTRIC BAND UTILIZING 11 CM VANGUARD BAND, ENTEROLYSIS ([facility]ISE[facility])    OTHER SURGICAL HISTORY  12/19/2022    Knee replacement    OTHER SURGICAL HISTORY  12/19/2022    Carpal tunnel surgery    OTHER SURGICAL HISTORY  12/19/2022    Cholecystectomy    OTHER SURGICAL HISTORY  12/19/2022    Hysterectomy    TOTAL KNEE ARTHROPLASTY      LEFT TOTAL KNEE ARTHROPLASTY (DR. DOUGLAS)    TOTAL KNEE ARTHROPLASTY  2015    RT TKA (DR. HARRIS)     Family History   Problem Relation Name Age of Onset    Lung cancer Mother      Liver " cancer Father      Diabetes Father      Hypertension Father      Hypertension Brother      Obesity Brother      Diabetes Brother      Obesity Daughter      Hypertension Daughter      Diabetes Daughter       Social History     Socioeconomic History    Marital status:      Spouse name: Not on file    Number of children: Not on file    Years of education: Not on file    Highest education level: Not on file   Occupational History    Not on file   Tobacco Use    Smoking status: Never     Passive exposure: Never    Smokeless tobacco: Never   Vaping Use    Vaping Use: Never used   Substance and Sexual Activity    Alcohol use: Never    Drug use: Never    Sexual activity: Not on file   Other Topics Concern    Not on file   Social History Narrative    Not on file     Social Determinants of Health     Financial Resource Strain: Not on file   Food Insecurity: Not on file   Transportation Needs: Not on file   Physical Activity: Not on file   Stress: Not on file   Social Connections: Not on file   Intimate Partner Violence: Not on file   Housing Stability: Not on file     Allergies   Allergen Reactions    Atropine Anaphylaxis, Nausea And Vomiting, Palpitations and Rash     HEART RACING    Codeine Itching and Unknown    Ezetimibe Unknown    Iodinated Contrast Media Other and Syncope     SYNCOPE    NAUSEA    VOMITING    Iodine Nausea/vomiting    Lisinopril Cough    Metformin Hcl Diarrhea    Simvastatin Unknown    Adhesive Tape-Silicones Rash     Current Outpatient Medications on File Prior to Visit   Medication Sig Dispense Refill    amitriptyline (Elavil) 10 mg tablet Take 2 tablets (20 mg) by mouth once daily at bedtime.      ascorbic acid (Vitamin C) 250 MG chewable tablet Chew 1 tablet (250 mg). Gummies dose unknown      calcium carbonate (Tums) 200 mg calcium chewable tablet Chew 1 tablet (500 mg) once daily.      CALCIUM CITRATE ORAL Take 500 mg by mouth 2 times a day.      cholecalciferol (Vitamin D3) 1,250 mcg  (50,000 unit) tablet Take 1 capsule by mouth 1 (one) time per week.      cyanocobalamin (Vitamin B-12) 1,000 mcg/mL injection Inject into the shoulder, thigh, or buttocks every 30 (thirty) days.      cyclobenzaprine (Flexeril) 10 mg tablet Take 1 tablet (10 mg) by mouth as needed at bedtime.      denosumab (Prolia) 60 mg/mL syringe Inject under the skin. As directed      gabapentin (Neurontin) 300 mg capsule Take 1 capsule (300 mg) by mouth 3 times a day.      glimepiride (Amaryl) 2 mg tablet Take 0.5 tablets (1 mg) by mouth once daily in the morning. Take before meals.      leflunomide (Arava) 10 mg tablet Take 1 tablet (10 mg) by mouth once daily.      multivit-minerals/folic acid (ADULT ONE DAILY MULTIVITAMIN ORAL) Take by mouth.      omeprazole (PriLOSEC) 40 mg DR capsule Take 1 capsule (40 mg) by mouth 2 times a day before meals.      oxybutynin (Ditropan) 5 mg tablet Take 1 tablet (5 mg) by mouth once daily. 90 tablet 2    sertraline (Zoloft) 25 mg tablet Take 1 tablet (25 mg) by mouth once daily.      sucralfate (Carafate) 1 gram tablet Take 1 tablet (1 g) by mouth. ON AN EMPTY STOMACH, CRUSH 3 TIMES DAILY      sulfaSALAzine (Azulfidine) 500 mg tablet Take 1 tablet (500 mg) by mouth 2 times a day.      traMADol (Ultram) 50 mg tablet Take 1 tablet (50 mg) by mouth once daily as needed for moderate pain (4 - 6) (OA/RA). 90 tablet 0    [DISCONTINUED] Lactobacillus acidophilus (PROBIOTIC ORAL) as directed Orally      [DISCONTINUED] ascorbic acid-elderberry fruit (Airborne, elderberry,) 100-50 mg tablet,chewable Chew. Dose unknown gummies      [DISCONTINUED] colchicine 0.6 mg tablet Take 1 tablet (0.6 mg) by mouth once daily.      [DISCONTINUED] dapagliflozin propanediol (Farxiga) 10 mg Take 1 tablet (10 mg) by mouth once daily in the morning.       Current Facility-Administered Medications on File Prior to Visit   Medication Dose Route Frequency Provider Last Rate Last Admin    cyanocobalamin (Vitamin B-12)  injection 1,000 mcg  1,000 mcg intramuscular q30 days Kevin Walker MD         Immunization History   Administered Date(s) Administered    Flu vaccine, quadrivalent, high-dose, preservative free, age 65y+ (FLUZONE) 10/21/2020, 10/21/2021, 12/05/2023    Hepatitis B vaccine, adult (RECOMBIVAX, ENGERIX) 10/11/2005, 12/13/2005, 08/25/2006    Influenza, High Dose Seasonal, Preservative Free 10/07/2016, 11/14/2018, 01/08/2020    Influenza, Seasonal, Quadrivalent, Adjuvanted 10/18/2022    Influenza, injectable, quadrivalent 11/14/2017    Influenza, seasonal, injectable 11/01/2008, 01/04/2011, 10/19/2011, 10/01/2014    Influenza, trivalent, adjuvanted 10/05/2020, 10/20/2020, 10/22/2021    Novel influenza-H1N1-09, preservative-free 01/06/2010    Pfizer COVID-19 vaccine, bivalent, age 12 years and older (30 mcg/0.3 mL) 10/24/2022    Pfizer Purple Cap SARS-CoV-2 03/08/2021, 03/29/2021, 10/22/2021    Pneumococcal conjugate vaccine, 13-valent (PREVNAR 13) 09/01/2015    Pneumococcal conjugate vaccine, 20-valent (PREVNAR 20) 10/18/2022    Pneumococcal polysaccharide vaccine, 23-valent, age 2 years and older (PNEUMOVAX 23) 01/01/2002, 01/04/2011    Tdap vaccine, age 7 year and older (BOOSTRIX, ADACEL) 01/08/2020    Zoster vaccine, recombinant, adult (SHINGRIX) 11/01/2022    Zoster, live 09/07/2012     Patient's medical history was reviewed and updated either before or during this encounter.       Assessment/Plan   Problem List Items Addressed This Visit    None  Visit Diagnoses       Sinusitis, unspecified chronicity, unspecified location    -  Primary    Relevant Medications    amoxicillin-pot clavulanate (Augmentin) 875-125 mg tablet            Osvaldo Rajput, APRN-CNP

## 2024-03-08 ENCOUNTER — OFFICE VISIT (OUTPATIENT)
Dept: SURGERY | Facility: CLINIC | Age: 74
End: 2024-03-08
Payer: MEDICARE

## 2024-03-08 ENCOUNTER — APPOINTMENT (OUTPATIENT)
Dept: SURGERY | Facility: CLINIC | Age: 74
End: 2024-03-08
Payer: MEDICARE

## 2024-03-08 DIAGNOSIS — E53.8 VITAMIN B 12 DEFICIENCY: Primary | ICD-10-CM

## 2024-03-08 RX ADMIN — CYANOCOBALAMIN 1000 MCG: 1000 INJECTION, SOLUTION INTRAMUSCULAR; SUBCUTANEOUS at 14:17

## 2024-03-08 ASSESSMENT — COLUMBIA-SUICIDE SEVERITY RATING SCALE - C-SSRS
1. IN THE PAST MONTH, HAVE YOU WISHED YOU WERE DEAD OR WISHED YOU COULD GO TO SLEEP AND NOT WAKE UP?: NO
2. HAVE YOU ACTUALLY HAD ANY THOUGHTS OF KILLING YOURSELF?: NO
6. HAVE YOU EVER DONE ANYTHING, STARTED TO DO ANYTHING, OR PREPARED TO DO ANYTHING TO END YOUR LIFE?: NO

## 2024-03-08 ASSESSMENT — PAIN SCALES - GENERAL: PAINLEVEL: 0-NO PAIN

## 2024-03-15 ENCOUNTER — INFUSION (OUTPATIENT)
Dept: INFUSION THERAPY | Facility: CLINIC | Age: 74
End: 2024-03-15
Payer: MEDICARE

## 2024-03-15 VITALS
SYSTOLIC BLOOD PRESSURE: 133 MMHG | RESPIRATION RATE: 16 BRPM | HEART RATE: 93 BPM | BODY MASS INDEX: 26.64 KG/M2 | TEMPERATURE: 97.7 F | OXYGEN SATURATION: 97 % | WEIGHT: 150.4 LBS | DIASTOLIC BLOOD PRESSURE: 60 MMHG

## 2024-03-15 DIAGNOSIS — M06.09 RHEUMATOID ARTHRITIS OF MULTIPLE SITES WITHOUT RHEUMATOID FACTOR (MULTI): ICD-10-CM

## 2024-03-15 PROCEDURE — 96372 THER/PROPH/DIAG INJ SC/IM: CPT | Performed by: NURSE PRACTITIONER

## 2024-03-15 RX ORDER — ALBUTEROL SULFATE 0.83 MG/ML
3 SOLUTION RESPIRATORY (INHALATION) AS NEEDED
Status: CANCELLED | OUTPATIENT
Start: 2024-04-11

## 2024-03-15 RX ORDER — FAMOTIDINE 10 MG/ML
20 INJECTION INTRAVENOUS ONCE AS NEEDED
Status: CANCELLED | OUTPATIENT
Start: 2024-04-11

## 2024-03-15 RX ORDER — EPINEPHRINE 0.3 MG/.3ML
0.3 INJECTION SUBCUTANEOUS EVERY 5 MIN PRN
Status: CANCELLED | OUTPATIENT
Start: 2024-04-11

## 2024-03-15 RX ORDER — DIPHENHYDRAMINE HYDROCHLORIDE 50 MG/ML
50 INJECTION INTRAMUSCULAR; INTRAVENOUS AS NEEDED
Status: CANCELLED | OUTPATIENT
Start: 2024-04-11

## 2024-03-15 ASSESSMENT — ENCOUNTER SYMPTOMS
ARTHRALGIAS: 1
MYALGIAS: 1

## 2024-03-15 ASSESSMENT — PAIN SCALES - GENERAL: PAINLEVEL: 5

## 2024-03-15 NOTE — PATIENT INSTRUCTIONS
Progress Notes by Lorene Liu MD at 07/07/17 09:48 AM     Author:  Lorene Liu MD Service:  (none) Author Type:  Physician     Filed:  07/07/17 09:18 PM Encounter Date:  7/7/2017 Status:  Signed     :  Lorene Liu MD (Physician)              PEDIATRIC ILLNESS VISIT   7/7/2017        Roomed by: Raqeul Self MA 9:48 AM      SUBJECTIVE  Accompanied by:[JE1.1T]  Father [phone] ok to leave a message[JE1.1M]   Carolina is a 5 year old female who is complaining of[JE1.1T] bump on neck[JE1.1M].  Present for[JE1.1T] few days[RT1.1M] and is[JE1.1T] stable[RT1.1M].  Present treatments include -[JE1.1T] none[RT1.1M].   Previous medical contacts for the problem -[JE1.1T] none[RT1.1M]  Symptoms:  Fever:[JE1.1T]     No elevation of temperature[RT1.1M]  General:[JE1.1T] No irritability or lethargy noted   Heent+Mouth:       NECK problems: large bump off centered on the front of the neck[RT1.1M]    ROS  All other ROS negative unless indicated otherwise above.    Allergies:  Review of patient's allergies indicates no known allergies.    Current Outpatient Prescriptions     Medication  Sig   • nystatin (MYCOSTATIN) ointment Apply to vaginal area twice per day for 5-7 days       Family history:[JE1.1T] No other family members have acute illnesses[RT1.1M]    Social history:[JE1.1T] Not in  or school[RT1.1M]       OBJECTIVE:   Physical exam  Pulse 100  Temp 98.7 °F (37.1 °C) (Temporal)   Resp 20  Wt 51 lb 3.2 oz (23.2 kg)    GENERAL:[JE1.1T] Normal- alert and no distress noted HEAD & SCALP: No lesions, swelling, tenderness or abnormalities.  EYES: No redness, swelling, drainage or abnormalities.  EARS: No abnormalities of external ears, canals or tm's.  NOSE: No swelling or drainage.  MOUTH: No abnormalities of tongue or mucosal membranes.  THROAT: No redness or lesions.  NECK: noted nodule just right of the thryoid area, soft mobile, 0.8 in by 0.8 in  CHEST: Lungs are clear to auscultation and  Today :Heather Thurston had no medications administered during this visit.     For:   1. Rheumatoid arthritis of multiple sites without rheumatoid factor (CMS/[facility])         Your next appointment is due in:  4/12/2024        Please read the  Medication Guide that was given to you and reviewed during todays visit.     (Tell all doctors including dentists that you are taking this medication)     Go to the emergency room or call 911 if:  -You have signs of allergic reaction:   -Rash, hives, itching.   -Swollen, blistered, peeling skin.   -Swelling of face, lips, mouth, tongue or throat.   -Tightness of chest, trouble breathing, swallowing or talking     Call your doctor:  - If IV / injection site gets red, warm, swollen, itchy or leaks fluid or pus.     (Leave dressing on your IV site for at least 2 hours and keep area clean and dry  - If you get sick or have symptoms of infection or are not feeling well for any reason.    (Wash your hands often, stay away from people who are sick)  - If you have side effects from your medication that do not go away or are bothersome.     (Refer to the teaching your nurse gave you for side effects to call your doctor about)    - Common side effects may include:  stuffy nose, headache, feeling tired, muscle aches, upset stomach  - Before receiving any vaccines     - Call [facility] at   If:  - You get sick, are on antibiotics, have had a recent vaccine, have surgery or dental work and your doctor wants your visit rescheduled.  - You need to cancel and reschedule your visit for any reason. Call at least 2 days before your visit if you need to cancel.   - Your insurance changes before your next visit.    (We will need to get approval from your new insurance. This can take up to two weeks.)     [facility] is opened Monday thru Friday. We are closed on weekends and holidays.   Voice mail will take your call if the center is closed. If you leave a  message please allow 24 hours for a call back during weekdays. If you leave a message on a weekend/holiday, we will call you back the next business day.               no retractions.  ABDOMEN: Soft, normal bowel sounds, no organomegaly, masses or tenderness.  SKIN: No rashes, lesions.    Axillae, right 0. 6 in by 0.6 in soft, mobile tender, no erythema noted    ASSES[RT1.1M]SMENT/PLAN[JE1.1T]  Neck mass with differential of enlarged lymph node, cyst, thryoid abnormality.  Labs and imaging recomended[RT1.1M]  Medication changes:[JE1.1T] No[RT1.1M]    Immunizations given today?[JE1.1T] Yes.  Vaccine counseling including benefits, risks and adverse reactions were provided by myself during the visit. No.[RT1.1M]  See Orders:  Instructed to call if the problem worsens or does not improve within the next 24 to 48 hours.    Schedule follow-up:[JE1.1T] refer to ENT[RT1.1M]    Electronically Signed by:    Lorene Liu MD , 7/7/2017[RT1.2T]            Revision History        User Key Date/Time User Provider Type Action    > RT1.2 07/07/17 09:18 PM Lorene Liu MD Physician Sign     RT1.1 07/07/17 09:14 PM Lorene Liu MD Physician      JE1.1 07/07/17 09:49 AM Raquel Self CMA Medical Assistant Sign at close encounter    M - Manual, T - Template

## 2024-03-15 NOTE — PROGRESS NOTES
[facility]   Infusion Clinic Note   Date: March 15, 2024   Name: Heather Thurston  : 1950   MRN: [id number]         Reason for Visit: OP Infusion (Cimzia injection)         Visit Type: INFUSION       Ordered By: Dr. Lyle      Accompanied by:Self      Diagnosis: Rheumatoid arthritis of multiple sites without rheumatoid factor (CMS/[facility])       Allergies:   Allergies as of 03/15/2024 - Reviewed 03/15/2024   Allergen Reaction Noted    Atropine Anaphylaxis, Nausea And Vomiting, Palpitations, and Rash 2023    Codeine Itching and Unknown 2023    Ezetimibe Unknown 2023    Iodinated contrast media Other and Syncope 2023    Iodine Nausea/vomiting 10/16/2019    Lisinopril Cough 2023    Metformin hcl Diarrhea 2023    Simvastatin Unknown 2023    Adhesive tape-silicones Rash 2023         Current Medications has a current medication list which includes the following prescription(s): amitriptyline, ascorbic acid, calcium carbonate, calcium citrate, cholecalciferol, cyanocobalamin, cyclobenzaprine, prolia, gabapentin, glimepiride, leflunomide, multivit-minerals/folic acid, omeprazole, oxybutynin, sertraline, sucralfate, sulfasalazine, and tramadol, and the following Facility-Administered Medications: cyanocobalamin.       Vitals:   Vitals:    03/15/24 1108   BP: 133/60   Pulse: 93   Resp: 16   Temp: 36.5 °C (97.7 °F)   TempSrc: Temporal   SpO2: 97%   Weight: 68.2 kg (150 lb 6.4 oz)   PainSc:   5   PainLoc: Generalized             Infusion Pre-procedure Checklist:   - Allergies reviewed: yes   - Medications reviewed: yes       - Previous reaction to current treatment: no      Assess patient for the concerns below. Document provider notification as appropriate.  - Active or recent infection with/without current antibiotic use: no  - Recent or planned invasive dental work: no  - Recent or planned surgeries: no  - Recently received or plans to receive  "vaccinations: no  - Has treatment related toxicities: no  - Is pregnant:  n/a      Pain: 0   - Is the pain different from normal: no   - Is the pain tolerable: n/a   - Is your Doctor aware:  n/a      Labs:  reviewed         Fall Risk Screening: Derik Fall Risk  History of Falling, Immediate or Within 3 Months: No  Secondary Diagnosis: Yes  Ambulatory Aid: Walks without aid/bedrest/nurse assist  Intravenous Therapy/Heparin Lock: No  Gait/Transferring: Normal/bedrest/immobile  Mental Status: Oriented to own ability  More Fall Risk Score: 15       Review Of Systems:  Review of Systems   Musculoskeletal:  Positive for arthralgias and myalgias.   All other systems reviewed and are negative.        Infusion Readiness:   - Assessment Concerns Related to Infusion: No  - Provider notified: no      Document Below Only If Indicated:   New Patient Education:    N/A (returning patient for continuation of therapy. Ongoing education provided as needed.)        Treatment Conditions & Drug Specific Questions:    Certolizumab Pegol  (CIMZIA)    (Unless otherwise specified on patient specific therapy plan):     TREATMENT CONDITIONS:  Unless otherwise specified on patient specific therapy plan HOLD treatment and notify provider prior to proceeding with today's infusion if patient with a:  o Positive Hepatitis B Surface Ag  o Positive T-Spot  o NO baseline CBC-D obtained  o NO baseline CMP obtained    No results found for: \"HAGCN\", \"HEPBSURABI\", \"HBSAG\", \"XHAGF\", \"HEPBSAG\", \"EXTHEPBSAG\", \"NONUHSWGH\"   No results found for: \"NONUHFIRE\", \"NONUHSWGH\", \"NONUHFISH\", \"EXTHEPBSAG\"  No results found for: \"TBSIN\", \"TBGRES\", \"QFG\", \"TSPOTR\"   Lab Results   Component Value Date    WBC 5.8 11/22/2022    HGB 11.0 (L) 11/22/2022    HCT 36.2 11/22/2022    MCV 85.0 11/22/2022     11/22/2022        Chemistry    Lab Results   Component Value Date/Time     05/23/2023 1233    K 4.1 05/23/2023 1233     05/23/2023 1233    CO2 29 " 05/23/2023 1233    BUN 11 05/23/2023 1233    CREATININE 0.8 05/23/2023 1233    Lab Results   Component Value Date/Time    CALCIUM 10.0 05/23/2023 1233    ALKPHOS 56 07/21/2022 1116    AST 20 07/21/2022 1116    ALT 10 07/21/2022 1116    BILITOT 0.2 07/21/2022 1116             Labs reviewed and patient meets treatment conditions? Yes    DRUG SPECIFIC QUESTIONS:  - Latex Allergy? No     (PFS may include Latex)    - History of heart failure? No     (may exacerbate)      REMINDERS:  Recommended Vitals/Observation:  Vitals: Obtain vitals prior to injection.  Observation: Patient may leave immediately following injection.        Weight Based Drug Calculations:    WEIGHT BASED DRUGS: NOT APPLICABLE / FLAT DOSE          Initiated By: Deja Pollack RN   Time: 11:45 AM     We administered certolizumab pegol.   1145 Patient tolerated injections well.  No S/S adverse reaction noted.  RTC 4/12/2024.

## 2024-03-21 ENCOUNTER — ANESTHESIA (OUTPATIENT)
Dept: GASTROENTEROLOGY | Facility: HOSPITAL | Age: 74
End: 2024-03-21
Payer: MEDICARE

## 2024-03-21 ENCOUNTER — HOSPITAL ENCOUNTER (OUTPATIENT)
Dept: GASTROENTEROLOGY | Facility: HOSPITAL | Age: 74
Discharge: HOME | End: 2024-03-21
Payer: MEDICARE

## 2024-03-21 ENCOUNTER — ANESTHESIA EVENT (OUTPATIENT)
Dept: GASTROENTEROLOGY | Facility: HOSPITAL | Age: 74
End: 2024-03-21
Payer: MEDICARE

## 2024-03-21 VITALS
DIASTOLIC BLOOD PRESSURE: 59 MMHG | HEIGHT: 63 IN | TEMPERATURE: 97.5 F | HEART RATE: 69 BPM | OXYGEN SATURATION: 100 % | BODY MASS INDEX: 26.65 KG/M2 | RESPIRATION RATE: 18 BRPM | SYSTOLIC BLOOD PRESSURE: 155 MMHG | WEIGHT: 150.4 LBS

## 2024-03-21 DIAGNOSIS — K21.9 GASTRO-ESOPHAGEAL REFLUX DISEASE WITHOUT ESOPHAGITIS: ICD-10-CM

## 2024-03-21 LAB
GLUCOSE BLD MANUAL STRIP-MCNC: 109 MG/DL (ref 74–99)
GLUCOSE BLD MANUAL STRIP-MCNC: 135 MG/DL (ref 74–99)

## 2024-03-21 PROCEDURE — 88305 TISSUE EXAM BY PATHOLOGIST: CPT | Performed by: PATHOLOGY

## 2024-03-21 PROCEDURE — 3700000002 HC GENERAL ANESTHESIA TIME - EACH INCREMENTAL 1 MINUTE

## 2024-03-21 PROCEDURE — 7100000001 HC RECOVERY ROOM TIME - INITIAL BASE CHARGE

## 2024-03-21 PROCEDURE — 7100000010 HC PHASE TWO TIME - EACH INCREMENTAL 1 MINUTE

## 2024-03-21 PROCEDURE — 3700000001 HC GENERAL ANESTHESIA TIME - INITIAL BASE CHARGE

## 2024-03-21 PROCEDURE — A43239 PR EDG TRANSORAL BIOPSY SINGLE/MULTIPLE: Performed by: ANESTHESIOLOGY

## 2024-03-21 PROCEDURE — 82947 ASSAY GLUCOSE BLOOD QUANT: CPT

## 2024-03-21 PROCEDURE — 88305 TISSUE EXAM BY PATHOLOGIST: CPT | Mod: TC | Performed by: SURGERY

## 2024-03-21 PROCEDURE — 7100000002 HC RECOVERY ROOM TIME - EACH INCREMENTAL 1 MINUTE

## 2024-03-21 PROCEDURE — 2720000007 HC OR 272 NO HCPCS

## 2024-03-21 PROCEDURE — 99100 ANES PT EXTEME AGE<1 YR&>70: CPT | Performed by: ANESTHESIOLOGY

## 2024-03-21 PROCEDURE — A43239 PR EDG TRANSORAL BIOPSY SINGLE/MULTIPLE: Performed by: NURSE ANESTHETIST, CERTIFIED REGISTERED

## 2024-03-21 PROCEDURE — 7100000009 HC PHASE TWO TIME - INITIAL BASE CHARGE

## 2024-03-21 PROCEDURE — 43239 EGD BIOPSY SINGLE/MULTIPLE: CPT | Performed by: SURGERY

## 2024-03-21 PROCEDURE — 2500000004 HC RX 250 GENERAL PHARMACY W/ HCPCS (ALT 636 FOR OP/ED): Performed by: NURSE ANESTHETIST, CERTIFIED REGISTERED

## 2024-03-21 RX ORDER — PROPOFOL 10 MG/ML
INJECTION, EMULSION INTRAVENOUS AS NEEDED
Status: DISCONTINUED | OUTPATIENT
Start: 2024-03-21 | End: 2024-03-21

## 2024-03-21 RX ORDER — HYDRALAZINE HYDROCHLORIDE 20 MG/ML
10 INJECTION INTRAMUSCULAR; INTRAVENOUS EVERY 30 MIN PRN
Status: DISCONTINUED | OUTPATIENT
Start: 2024-03-21 | End: 2024-03-22 | Stop reason: HOSPADM

## 2024-03-21 RX ORDER — PROPOFOL 10 MG/ML
INJECTION, EMULSION INTRAVENOUS CONTINUOUS PRN
Status: DISCONTINUED | OUTPATIENT
Start: 2024-03-21 | End: 2024-03-21

## 2024-03-21 RX ORDER — LABETALOL HYDROCHLORIDE 5 MG/ML
INJECTION, SOLUTION INTRAVENOUS AS NEEDED
Status: DISCONTINUED | OUTPATIENT
Start: 2024-03-21 | End: 2024-03-21

## 2024-03-21 RX ORDER — ONDANSETRON HYDROCHLORIDE 2 MG/ML
4 INJECTION, SOLUTION INTRAVENOUS ONCE AS NEEDED
Status: DISCONTINUED | OUTPATIENT
Start: 2024-03-21 | End: 2024-03-22 | Stop reason: HOSPADM

## 2024-03-21 RX ORDER — LIDOCAINE HYDROCHLORIDE 10 MG/ML
0.1 INJECTION INFILTRATION; PERINEURAL ONCE
Status: DISCONTINUED | OUTPATIENT
Start: 2024-03-21 | End: 2024-03-22 | Stop reason: HOSPADM

## 2024-03-21 RX ORDER — SODIUM CHLORIDE, SODIUM LACTATE, POTASSIUM CHLORIDE, CALCIUM CHLORIDE 600; 310; 30; 20 MG/100ML; MG/100ML; MG/100ML; MG/100ML
INJECTION, SOLUTION INTRAVENOUS CONTINUOUS PRN
Status: DISCONTINUED | OUTPATIENT
Start: 2024-03-21 | End: 2024-03-21

## 2024-03-21 RX ORDER — ALBUTEROL SULFATE 0.83 MG/ML
2.5 SOLUTION RESPIRATORY (INHALATION) ONCE
Status: CANCELLED | OUTPATIENT
Start: 2024-03-21 | End: 2024-03-21

## 2024-03-21 RX ORDER — ALBUTEROL SULFATE 0.83 MG/ML
2.5 SOLUTION RESPIRATORY (INHALATION) ONCE AS NEEDED
Status: DISCONTINUED | OUTPATIENT
Start: 2024-03-21 | End: 2024-03-22 | Stop reason: HOSPADM

## 2024-03-21 RX ORDER — MIDAZOLAM HYDROCHLORIDE 1 MG/ML
INJECTION, SOLUTION INTRAMUSCULAR; INTRAVENOUS AS NEEDED
Status: DISCONTINUED | OUTPATIENT
Start: 2024-03-21 | End: 2024-03-21

## 2024-03-21 RX ORDER — DIPHENHYDRAMINE HYDROCHLORIDE 50 MG/ML
12.5 INJECTION INTRAMUSCULAR; INTRAVENOUS ONCE AS NEEDED
Status: DISCONTINUED | OUTPATIENT
Start: 2024-03-21 | End: 2024-03-22 | Stop reason: HOSPADM

## 2024-03-21 RX ORDER — LABETALOL HYDROCHLORIDE 5 MG/ML
10 INJECTION, SOLUTION INTRAVENOUS ONCE AS NEEDED
Status: DISCONTINUED | OUTPATIENT
Start: 2024-03-21 | End: 2024-03-22 | Stop reason: HOSPADM

## 2024-03-21 RX ADMIN — MIDAZOLAM 0.5 MG: 1 INJECTION INTRAMUSCULAR; INTRAVENOUS at 13:50

## 2024-03-21 RX ADMIN — MIDAZOLAM 1 MG: 1 INJECTION INTRAMUSCULAR; INTRAVENOUS at 13:41

## 2024-03-21 RX ADMIN — PROPOFOL 100 MCG/KG/MIN: 10 INJECTION, EMULSION INTRAVENOUS at 13:44

## 2024-03-21 RX ADMIN — SODIUM CHLORIDE, POTASSIUM CHLORIDE, SODIUM LACTATE AND CALCIUM CHLORIDE: 600; 310; 30; 20 INJECTION, SOLUTION INTRAVENOUS at 13:40

## 2024-03-21 RX ADMIN — LABETALOL HYDROCHLORIDE 5 MG: 5 INJECTION, SOLUTION INTRAVENOUS at 14:02

## 2024-03-21 RX ADMIN — PROPOFOL 50 MG: 10 INJECTION, EMULSION INTRAVENOUS at 13:41

## 2024-03-21 SDOH — HEALTH STABILITY: MENTAL HEALTH: CURRENT SMOKER: 0

## 2024-03-21 ASSESSMENT — PAIN - FUNCTIONAL ASSESSMENT
PAIN_FUNCTIONAL_ASSESSMENT: 0-10

## 2024-03-21 ASSESSMENT — COLUMBIA-SUICIDE SEVERITY RATING SCALE - C-SSRS
1. IN THE PAST MONTH, HAVE YOU WISHED YOU WERE DEAD OR WISHED YOU COULD GO TO SLEEP AND NOT WAKE UP?: NO
6. HAVE YOU EVER DONE ANYTHING, STARTED TO DO ANYTHING, OR PREPARED TO DO ANYTHING TO END YOUR LIFE?: NO
2. HAVE YOU ACTUALLY HAD ANY THOUGHTS OF KILLING YOURSELF?: NO

## 2024-03-21 ASSESSMENT — PAIN SCALES - GENERAL
PAIN_LEVEL: 0
PAINLEVEL_OUTOF10: 0 - NO PAIN
PAINLEVEL_OUTOF10: 2
PAINLEVEL_OUTOF10: 0 - NO PAIN

## 2024-03-21 NOTE — H&P
History Of Present Illness  Heather Thurston is a 74 y.o. woman presenting for EGD.  She has long standing need for bid ppi.     Past Medical History  Past Medical History:   Diagnosis Date    Adrenal mass (CMS/HCC)     '05 to '14 no change by CT    Anxiety 08/26/2023    Chronic arthritis     tramadol prn    COVID     CVA (cerebral vascular accident) (CMS/HCC)     DJD (degenerative joint disease)     Epigastric pain 11/22/2022    Fibromyalgia     GERD (gastroesophageal reflux disease)     Gout     HLD (hyperlipidemia)     HTN (hypertension)     htn ecg nsr    Intestinal malabsorption 08/26/2023    Osteoporosis     DEXA OSTEOPOROSIS pROLIA    Overactive bladder 08/26/2023    Personal history of other diseases of the musculoskeletal system and connective tissue     History of rheumatoid arthritis    Plantar fasciitis of left foot     Rheumatoid arthritis of multiple sites without rheumatoid factor (CMS/HCC) 11/08/2023    Sinusitis 02/29/2024    Thyroid nodule     10/22  ENT  eval neg.       Surgical History  Past Surgical History:   Procedure Laterality Date    EYE SURGERY      LOWER EYELID, LID RAISE    LAPAROSCOPIC GASTRIC BANDING  05/21/2007    LAPAROSCOPIC ADJUSTABLE GASTRIC BAND UTILIZING 11 CM VANGUARD BAND, ENTEROLYSIS (ELISE-NATIVIDAD)    OTHER SURGICAL HISTORY  12/19/2022    Knee replacement    OTHER SURGICAL HISTORY  12/19/2022    Carpal tunnel surgery    OTHER SURGICAL HISTORY  12/19/2022    Cholecystectomy    OTHER SURGICAL HISTORY  12/19/2022    Hysterectomy    TOTAL KNEE ARTHROPLASTY      LEFT TOTAL KNEE ARTHROPLASTY (DR. DOUGLAS)    TOTAL KNEE ARTHROPLASTY  2015    RT TKA (DR. HARRIS)        Social History  She reports that she has never smoked. She has never been exposed to tobacco smoke. She has never used smokeless tobacco. She reports that she does not drink alcohol and does not use drugs.    Family History  Family History   Problem Relation Name Age of Onset    Lung cancer Mother      Liver cancer  "Father      Diabetes Father      Hypertension Father      Hypertension Brother      Obesity Brother      Diabetes Brother      Obesity Daughter      Hypertension Daughter      Diabetes Daughter          Allergies  Atropine, Codeine, Ezetimibe, Iodinated contrast media, Iodine, Lisinopril, Metformin hcl, Simvastatin, and Adhesive tape-silicones    Review of Systems     Physical Exam       General Examination:         GENERAL APPEARANCE: alert and oriented x 3, Pleasant and cooperative, No Acute Distress.          HEENT: PERRLA.          HEART: regular rate and rhythm.          LUNGS: clear to auscultation bilaterally.          CHEST: normal shape and expansion.          ABDOMEN: no hernias present, soft and not tender, no guarding, no CVA tenderness.          NEUROLOGIC EXAM: CN's II-XII grossly intact, gait normal.          BACK: no CVA tenderness.       Last Recorded Vitals  Blood pressure 124/75, pulse 91, temperature 36.5 °C (97.7 °F), temperature source Temporal, resp. rate 18, height 1.6 m (5' 2.99\"), weight 68.2 kg (150 lb 6.4 oz), SpO2 97 %.       Assessment/Plan   Active Problems:  There are no active Hospital Problems.                   Kevin Walker MD    "

## 2024-03-21 NOTE — DISCHARGE INSTRUCTIONS
Instructions  Patient Instructions after a Colonoscopy, Upper GI, and ERCP      The anesthetics, sedatives or narcotics which were given to you today will be acting in your body for the next 24 hours, so you might feel a little sleepy or groggy.  This feeling should slowly wear off. Carefully read and follow the instructions.      You received sedation today:  - Do not drive or operate any machinery or power tools of any kind.   - No alcoholic beverages today, not even beer or wine.  - Do not make any important decisions or sign any legal documents.  - No over the counter medications that contain alcohol or that may cause drowsiness.  - Do not make any important decisions or sign any legal documents.     While it is common to experience mild to moderate abdominal distention, gas, or belching after your procedure, if any of these symptoms occur following discharge from the GI Lab or within one week of having your procedure, call the [facility] to be advised whether a visit to your nearest Urgent Care or Emergency Department is indicated.  Take this paper with you if you go.      - If you develop an allergic reaction to the medications that were given during your procedure such as difficulty breathing, rash, hives, severe nausea, vomiting or lightheadedness.- If you experience chest pain, shortness of breath, severe abdominal pain, fevers and chills.     -If you develop signs and symptoms of bleeding such as blood in your spit, if your stools turn black, tarry, or bloody     - If you have not urinated within 8 hours following your procedure.- If your IV site becomes painful, red, inflamed, or looks infected.     If you received a biopsy/polypectomy/sphincterotomy the following instructions apply below:     - Do not use Aspirin containing products, non-steroidal medications or anti-coagulants for one week following your procedure. (Examples of these types of medications are: Advil, Arthrotec, Aleve,  Coumadin, Ecotrin, Heparin, Ibuprofen, Indocin, Motrin, Naprosyn, Nuprin, Plavix, Vioxx, and Voltarin, or their generic forms.  This list is not all-inclusive.  Check with your physician or pharmacist before resuming medications.)      - Eat a soft diet today.  Avoid foods that are poorly digested for the next 24 hours.  These foods would include: nuts, beans, lettuce, red meats, and fried foods.       - Start with liquids and advance your diet as tolerated, gradually work up to eating solids.      - Do not have a Barium Study or Enema for one week.     Your physician recommends the additional following instructions:     Upper GI endoscopy: Resume your previous diet, continue your medications, recommendation to repeat upper endoscopy in three months for follow up of De Leon's ablation. Return to normal activity tomorrow.      If you experience any problems or have any questions following discharge from the GI Lab, please call:  Before 5p.m.  [phone]  After 5p.m.    [phone]     Nurse Signature                                                                        Date___________________                                                                            Patient/Responsible Party Signature                                        Date___________________

## 2024-03-21 NOTE — ANESTHESIA PREPROCEDURE EVALUATION
Patient: Heather Thurston    Procedure Information       Date/Time: 03/21/24 1330    Scheduled providers: Kevin Walker MD; Wilbur Cho DO    Procedure: EGD    Location: [facility]            Relevant Problems   Cardiovascular   (+) Benign hypertension   (+) Pure hypercholesterolemia      Endocrine   (+) Hyperparathyroidism (CMS/HCC)   (+) Hypothyroid   (+) Morbid (severe) obesity due to excess calories (CMS/HCC)   (+) Type 2 diabetes mellitus with hyperglycemia (CMS/HCC)   (+) Type 2 diabetes mellitus without complication (CMS/HCC)      GI   (+) Gastroesophageal reflux disease      /Renal   (+) Hepatitis      Neuro/Psych   (+) Anxiety   (+) Cervical radiculitis   (+) Polyneuropathy, unspecified      GI/Hepatic   (+) Hepatitis      Musculoskeletal   (+) Chronic pain syndrome   (+) Fibromyalgia   (+) Rheumatoid arthritis of multiple sites without rheumatoid factor (CMS/HCC)   (+) Spinal stenosis of lumbar region      Other   (+) Chronic arthritis   (+) Rheumatoid arthritis of multiple sites without rheumatoid factor (CMS/HCC)       Clinical information reviewed:   Tobacco  Allergies  Meds   Med Hx  Surg Hx   Fam Hx  Soc Hx        NPO Detail:  NPO/Void Status  Carbohydrate Drink Given Prior to Surgery? : N  Date of Last Liquid: 03/20/24  Time of Last Liquid: 2350  Date of Last Solid: 03/20/24  Time of Last Solid: 2350  Last Intake Type: Clear fluids  Time of Last Void: 1215         Physical Exam    Airway  Mallampati: II  TM distance: >3 FB  Neck ROM: full     Cardiovascular   Rhythm: regular  Rate: normal     Dental - normal exam     Pulmonary   Breath sounds clear to auscultation     Abdominal   Abdomen: soft             Anesthesia Plan    History of general anesthesia?: yes  History of complications of general anesthesia?: no    ASA 3     general and MAC     The patient is not a current smoker.    intravenous induction   Postoperative administration of opioids is intended.  Anesthetic  plan and risks discussed with patient.    Plan discussed with CRNA, attending and CAA.

## 2024-03-21 NOTE — POST-PROCEDURE NOTE
1443:  Transported patient to [facility] Phase II alert and oriented.  IV intact and infusing with LR at KVO.  Spouse brought back to bedside    Patient given beverage and snack, tolerating without difficulty    1514:  Home going instructions reviewed with patient and her spouse, no questions or concerns     1524:  Patient ambulated to the bathroom and back without assistance

## 2024-03-21 NOTE — ANESTHESIA POSTPROCEDURE EVALUATION
Patient: Heather Thurston    Procedure Summary       Date: 03/21/24 Room / Location: [facility]    Anesthesia Start: 1340 Anesthesia Stop: 1425    Procedure: EGD Diagnosis: Gastro-esophageal reflux disease without esophagitis    Scheduled Providers: Kevin Walker MD; Wilbur Cho DO Responsible Provider: Wilbur Cho DO    Anesthesia Type: general, MAC ASA Status: 3            Anesthesia Type: general, MAC    Vitals Value Taken Time   /70 03/21/24 1451   Temp 36.3 °C (97.3 °F) 03/21/24 1451   Pulse 66 03/21/24 1451   Resp 17 03/21/24 1451   SpO2 98 % 03/21/24 1451       Anesthesia Post Evaluation    Patient location during evaluation: bedside  Patient participation: complete - patient participated  Level of consciousness: awake  Pain score: 0  Pain management: adequate  Airway patency: patent  Two or more strategies used to mitigate risk of obstructive sleep apnea  Cardiovascular status: acceptable  Respiratory status: acceptable  Hydration status: acceptable  Postoperative Nausea and Vomiting: none        No notable events documented.

## 2024-03-25 LAB
LABORATORY COMMENT REPORT: NORMAL
PATH REPORT.FINAL DX SPEC: NORMAL
PATH REPORT.GROSS SPEC: NORMAL
PATH REPORT.RELEVANT HX SPEC: NORMAL
PATH REPORT.TOTAL CANCER: NORMAL

## 2024-03-28 ENCOUNTER — TELEPHONE (OUTPATIENT)
Dept: SURGERY | Facility: CLINIC | Age: 74
End: 2024-03-28
Payer: MEDICARE

## 2024-04-02 ENCOUNTER — TELEPHONE (OUTPATIENT)
Dept: SURGERY | Facility: HOSPITAL | Age: 74
End: 2024-04-02
Payer: MEDICARE

## 2024-04-02 NOTE — TELEPHONE ENCOUNTER
I called Heather to review her EGD.  I recommended that we obtain EMOT in attempt to understand her symptoms.  She would like to delay the procedure until June.  She would like it placed endosscopically.

## 2024-04-03 DIAGNOSIS — K21.9 GASTROESOPHAGEAL REFLUX DISEASE, UNSPECIFIED WHETHER ESOPHAGITIS PRESENT: ICD-10-CM

## 2024-04-03 RX ORDER — CYANOCOBALAMIN 1000 UG/ML
1000 INJECTION, SOLUTION INTRAMUSCULAR; SUBCUTANEOUS ONCE
Status: DISCONTINUED | OUTPATIENT
Start: 2024-04-03 | End: 2024-04-10

## 2024-04-10 ENCOUNTER — OFFICE VISIT (OUTPATIENT)
Dept: SURGERY | Facility: CLINIC | Age: 74
End: 2024-04-10
Payer: MEDICARE

## 2024-04-10 DIAGNOSIS — K90.9 INTESTINAL MALABSORPTION, UNSPECIFIED TYPE (HHS-HCC): ICD-10-CM

## 2024-04-10 DIAGNOSIS — E53.8 VITAMIN B 12 DEFICIENCY: ICD-10-CM

## 2024-04-10 PROCEDURE — 1036F TOBACCO NON-USER: CPT

## 2024-04-10 PROCEDURE — 1126F AMNT PAIN NOTED NONE PRSNT: CPT

## 2024-04-10 PROCEDURE — 1159F MED LIST DOCD IN RCRD: CPT

## 2024-04-10 PROCEDURE — 96372 THER/PROPH/DIAG INJ SC/IM: CPT

## 2024-04-10 PROCEDURE — 1160F RVW MEDS BY RX/DR IN RCRD: CPT

## 2024-04-10 RX ORDER — CYANOCOBALAMIN 1000 UG/ML
1000 INJECTION, SOLUTION INTRAMUSCULAR; SUBCUTANEOUS ONCE
Status: COMPLETED | OUTPATIENT
Start: 2024-04-10 | End: 2024-04-10

## 2024-04-10 RX ADMIN — CYANOCOBALAMIN 1000 MCG: 1000 INJECTION, SOLUTION INTRAMUSCULAR; SUBCUTANEOUS at 12:07

## 2024-04-10 ASSESSMENT — ENCOUNTER SYMPTOMS
LOSS OF SENSATION IN FEET: 0
DEPRESSION: 0
OCCASIONAL FEELINGS OF UNSTEADINESS: 0

## 2024-04-10 ASSESSMENT — PATIENT HEALTH QUESTIONNAIRE - PHQ9
SUM OF ALL RESPONSES TO PHQ9 QUESTIONS 1 AND 2: 0
2. FEELING DOWN, DEPRESSED OR HOPELESS: NOT AT ALL
1. LITTLE INTEREST OR PLEASURE IN DOING THINGS: NOT AT ALL

## 2024-04-10 ASSESSMENT — COLUMBIA-SUICIDE SEVERITY RATING SCALE - C-SSRS
6. HAVE YOU EVER DONE ANYTHING, STARTED TO DO ANYTHING, OR PREPARED TO DO ANYTHING TO END YOUR LIFE?: NO
2. HAVE YOU ACTUALLY HAD ANY THOUGHTS OF KILLING YOURSELF?: NO
1. IN THE PAST MONTH, HAVE YOU WISHED YOU WERE DEAD OR WISHED YOU COULD GO TO SLEEP AND NOT WAKE UP?: NO

## 2024-04-10 ASSESSMENT — PAIN SCALES - GENERAL: PAINLEVEL: 0-NO PAIN

## 2024-04-11 ENCOUNTER — APPOINTMENT (OUTPATIENT)
Dept: INFUSION THERAPY | Facility: CLINIC | Age: 74
End: 2024-04-11
Payer: MEDICARE

## 2024-04-25 DIAGNOSIS — M79.2 NEUROPATHIC PAIN: Primary | ICD-10-CM

## 2024-04-25 RX ORDER — SERTRALINE HYDROCHLORIDE 50 MG/1
50 TABLET, FILM COATED ORAL DAILY
Qty: 90 TABLET | Refills: 3 | Status: SHIPPED | OUTPATIENT
Start: 2024-04-25 | End: 2025-04-25

## 2024-05-03 DIAGNOSIS — K90.9 INTESTINAL MALABSORPTION, UNSPECIFIED TYPE (HHS-HCC): ICD-10-CM

## 2024-05-03 RX ORDER — CYANOCOBALAMIN 1000 UG/ML
1000 INJECTION, SOLUTION INTRAMUSCULAR; SUBCUTANEOUS ONCE
Status: COMPLETED | OUTPATIENT
Start: 2024-05-03 | End: 2024-05-10

## 2024-05-09 ENCOUNTER — INFUSION (OUTPATIENT)
Dept: INFUSION THERAPY | Facility: CLINIC | Age: 74
End: 2024-05-09
Payer: MEDICARE

## 2024-05-09 VITALS
TEMPERATURE: 97.8 F | SYSTOLIC BLOOD PRESSURE: 128 MMHG | HEART RATE: 65 BPM | OXYGEN SATURATION: 98 % | RESPIRATION RATE: 16 BRPM | DIASTOLIC BLOOD PRESSURE: 75 MMHG

## 2024-05-09 DIAGNOSIS — M06.09 RHEUMATOID ARTHRITIS OF MULTIPLE SITES WITHOUT RHEUMATOID FACTOR (MULTI): ICD-10-CM

## 2024-05-09 PROCEDURE — 96372 THER/PROPH/DIAG INJ SC/IM: CPT | Performed by: NURSE PRACTITIONER

## 2024-05-09 RX ORDER — ALBUTEROL SULFATE 0.83 MG/ML
3 SOLUTION RESPIRATORY (INHALATION) AS NEEDED
Status: CANCELLED | OUTPATIENT
Start: 2024-05-10

## 2024-05-09 RX ORDER — DIPHENHYDRAMINE HYDROCHLORIDE 50 MG/ML
50 INJECTION INTRAMUSCULAR; INTRAVENOUS AS NEEDED
Status: CANCELLED | OUTPATIENT
Start: 2024-05-10

## 2024-05-09 RX ORDER — EPINEPHRINE 0.3 MG/.3ML
0.3 INJECTION SUBCUTANEOUS EVERY 5 MIN PRN
Status: CANCELLED | OUTPATIENT
Start: 2024-05-10

## 2024-05-09 RX ORDER — FAMOTIDINE 10 MG/ML
20 INJECTION INTRAVENOUS ONCE AS NEEDED
Status: CANCELLED | OUTPATIENT
Start: 2024-05-10

## 2024-05-09 RX ORDER — CERTOLIZUMAB PEGOL 200 MG/ML
400 INJECTION, SOLUTION SUBCUTANEOUS
COMMUNITY

## 2024-05-09 ASSESSMENT — PAIN SCALES - GENERAL: PAINLEVEL: 3

## 2024-05-09 NOTE — PROGRESS NOTES
[facility]   infusion Clinic Note   Date: May 9, 2024   Name: Heather Thurston  : 1950   MRN: [id number]         Reason for Visit: Injections (Cimzia)         Visit Type: INJECTION      Ordered By: Dr Lyle      Accompanied by:Self      Diagnosis: Rheumatoid arthritis of multiple sites without rheumatoid factor (Multi)       Allergies:   Allergies as of 2024 - Reviewed 2024   Allergen Reaction Noted    Atropine Anaphylaxis, Nausea And Vomiting, Palpitations, and Rash 2023    Codeine Itching and Unknown 2023    Ezetimibe Unknown 2023    Iodinated contrast media Other and Syncope 2023    Iodine Nausea/vomiting 10/16/2019    Lisinopril Cough 2023    Metformin hcl Diarrhea 2023    Simvastatin Unknown 2023    Adhesive tape-silicones Rash 2023         Current Medications has a current medication list which includes the following prescription(s): amitriptyline, ascorbic acid, calcium carbonate, calcium citrate, cimzia, cholecalciferol, cyanocobalamin, cyclobenzaprine, prolia, gabapentin, glimepiride, leflunomide, multivit-minerals/folic acid, omeprazole, oxybutynin, sertraline, sucralfate, sulfasalazine, and tramadol, and the following Facility-Administered Medications: cyanocobalamin.       Vitals:  Vitals:    24 1158   BP: 128/75   Pulse: 65   Resp: 16   Temp: 36.6 °C (97.8 °F)   SpO2: 98%             Infusion Pre-procedure Checklist:   - Allergies reviewed: yes   - Medications reviewed: yes       - Previous reaction to current treatment: no      Assess patient for the concerns below. Document provider notification as appropriate.  - Active or recent infection with/without current antibiotic use: no  - Recent or planned invasive dental work: no  - Recent or planned surgeries: no  - Recently received or plans to receive vaccinations: no  - Has treatment related toxicities: no  - Is pregnant:  n/a      Pain: 3   - Is the  "pain different from normal: no   - Is the pain tolerable: yes   - Is your Doctor aware:  yes      Labs:  Reviewed         Fall Risk Screening: More Fall Risk  History of Falling, Immediate or Within 3 Months: No  Secondary Diagnosis: Yes  Ambulatory Aid: Walks without aid/bedrest/nurse assist  Intravenous Therapy/Heparin Lock: Yes  Gait/Transferring: Normal/bedrest/immobile  Mental Status: Oriented to own ability  More Fall Risk Score: 35       Review Of Systems:  Review of Systems   All other systems reviewed and are negative.        Infusion Readiness:   - Assessment Concerns Related to Infusion: No  - Provider notified: n/a      Document Below Only If Indicated:   New Patient Education:    N/A (returning patient for continuation of therapy. Ongoing education provided as needed.)        Treatment Conditions & Drug Specific Questions:    Denosumab  (PROLIA. XGEVA)    (Unless otherwise specified on patient specific therapy plan):     TREATMENT CONDITIONS:  Unless otherwise specified on patient specific therapy plan HOLD and notify provider prior to proceeding with today's injection if patients:  o Calcium is LESS THAN 8.6 mg/dL OR  Ionized Calcium LESS THAN 1.1 mmol/L  o Recent or planned invasive dental procedure (within 4 weeks)    Lab Results   Component Value Date    CALCIUM 10.0 05/23/2023    PHOS 3.7 07/15/2020      No results found for: \"NONUHFIRE\", \"IONCA\", \"CCAIO\", \"IONCALVEN\"     Labs reviewed and patient meets treatment conditions? Yes    DRUG SPECIFIC QUESTIONS:  Is the patient taking calcium and vitamin D? Yes  (Recommended)    Pt Instructed on following risks: (1) hypocalcemia, (2) osteonecrosis of the jaw, (3) atypical femoral fractures, (4) serious infections, and (5) dermatologic reactions?  Yes      REMINDER:  PREGNANCY CATEGORY X DRUG. OBTAIN NEGTATIVE PREGNANCY TEST PRIOR TO FIRST INFUSION FOR WOMEN OF CHILDBEARING ABILITY   REMS DRUG    Recommended Vitals/Observation:  Vitals: Obtain vitals " prior to injection.  Observation: Patient may leave immediately following injection.        Weight Based Drug Calculations:    WEIGHT BASED DRUGS: NOT APPLICABLE / FLAT DOSE          Initiated By: Fabiana Zelaya RN   Time: 12:02 PM     We administered certolizumab pegol. Pt aware of the need to find new prescribing doctor and have them enter new treatment plan into Epic for August dose.

## 2024-05-09 NOTE — PATIENT INSTRUCTIONS
Today :We administered certolizumab pegol.     For:   1. Rheumatoid arthritis of multiple sites without rheumatoid factor (Multi)         Your next appointment is due in:  28 Days        Please read the  Medication Guide that was given to you and reviewed during todays visit.     (Tell all doctors including dentists that you are taking this medication)     Go to the emergency room or call 911 if:  -You have signs of allergic reaction:   -Rash, hives, itching.   -Swollen, blistered, peeling skin.   -Swelling of face, lips, mouth, tongue or throat.   -Tightness of chest, trouble breathing, swallowing or talking     Call your doctor:  - If IV / injection site gets red, warm, swollen, itchy or leaks fluid or pus.     (Leave dressing on your IV site for at least 2 hours and keep area clean and dry  - If you get sick or have symptoms of infection or are not feeling well for any reason.    (Wash your hands often, stay away from people who are sick)  - If you have side effects from your medication that do not go away or are bothersome.     (Refer to the teaching your nurse gave you for side effects to call your doctor about)    - Common side effects may include:  stuffy nose, headache, feeling tired, muscle aches, upset stomach  - Before receiving any vaccines     - Call the [facility] at   If:  - You get sick, are on antibiotics, have had a recent vaccine, have surgery or dental work and your doctor wants your visit rescheduled.  - You need to cancel and reschedule your visit for any reason. Call at least 2 days before your visit if you need to cancel.   - Your insurance changes before your next visit.    (We will need to get approval from your new insurance. This can take up to two weeks.)     The [facility] is opened Monday thru Friday. We are closed on weekends and holidays.   Voice mail will take your call if the center is closed. If you leave a message please allow 24 hours for a call  back during weekdays. If you leave a message on a weekend/holiday, we will call you back the next business day.

## 2024-05-10 ENCOUNTER — OFFICE VISIT (OUTPATIENT)
Dept: SURGERY | Facility: CLINIC | Age: 74
End: 2024-05-10
Payer: MEDICARE

## 2024-05-10 DIAGNOSIS — E53.8 VITAMIN B 12 DEFICIENCY: Primary | ICD-10-CM

## 2024-05-10 DIAGNOSIS — K90.9 INTESTINAL MALABSORPTION, UNSPECIFIED TYPE (HHS-HCC): ICD-10-CM

## 2024-05-10 PROCEDURE — 1160F RVW MEDS BY RX/DR IN RCRD: CPT

## 2024-05-10 PROCEDURE — 96372 THER/PROPH/DIAG INJ SC/IM: CPT

## 2024-05-10 PROCEDURE — 1159F MED LIST DOCD IN RCRD: CPT

## 2024-05-10 RX ADMIN — CYANOCOBALAMIN 1000 MCG: 1000 INJECTION, SOLUTION INTRAMUSCULAR; SUBCUTANEOUS at 13:08

## 2024-06-06 ENCOUNTER — INFUSION (OUTPATIENT)
Dept: INFUSION THERAPY | Facility: CLINIC | Age: 74
End: 2024-06-06
Payer: MEDICARE

## 2024-06-06 VITALS
HEART RATE: 81 BPM | DIASTOLIC BLOOD PRESSURE: 78 MMHG | TEMPERATURE: 98.8 F | RESPIRATION RATE: 16 BRPM | OXYGEN SATURATION: 96 % | SYSTOLIC BLOOD PRESSURE: 141 MMHG

## 2024-06-06 DIAGNOSIS — M06.09 RHEUMATOID ARTHRITIS OF MULTIPLE SITES WITHOUT RHEUMATOID FACTOR (MULTI): ICD-10-CM

## 2024-06-06 PROCEDURE — 96372 THER/PROPH/DIAG INJ SC/IM: CPT | Performed by: NURSE PRACTITIONER

## 2024-06-06 RX ORDER — CYANOCOBALAMIN 1000 UG/ML
1000 INJECTION, SOLUTION INTRAMUSCULAR; SUBCUTANEOUS ONCE
Status: SHIPPED | OUTPATIENT
Start: 2024-06-12

## 2024-06-06 RX ORDER — EPINEPHRINE 0.3 MG/.3ML
0.3 INJECTION SUBCUTANEOUS EVERY 5 MIN PRN
OUTPATIENT
Start: 2024-07-04

## 2024-06-06 RX ORDER — DIPHENHYDRAMINE HYDROCHLORIDE 50 MG/ML
50 INJECTION INTRAMUSCULAR; INTRAVENOUS AS NEEDED
OUTPATIENT
Start: 2024-07-04

## 2024-06-06 RX ORDER — ALBUTEROL SULFATE 0.83 MG/ML
3 SOLUTION RESPIRATORY (INHALATION) AS NEEDED
OUTPATIENT
Start: 2024-07-04

## 2024-06-06 RX ORDER — FAMOTIDINE 10 MG/ML
20 INJECTION INTRAVENOUS ONCE AS NEEDED
OUTPATIENT
Start: 2024-07-04

## 2024-06-06 ASSESSMENT — PAIN SCALES - GENERAL: PAINLEVEL: 6

## 2024-06-06 ASSESSMENT — ENCOUNTER SYMPTOMS
ARTHRALGIAS: 1
MYALGIAS: 1

## 2024-06-06 NOTE — PATIENT INSTRUCTIONS
Today :Heather Thurston had no medications administered during this visit.     For:   1. Rheumatoid arthritis of multiple sites without rheumatoid factor (Multi)         Your next appointment is due in:  7Today :We administered certolizumab pegol.     For:   1. Rheumatoid arthritis of multiple sites without rheumatoid factor (Multi)         Your next appointment is due in:  7/5/2024        Please read the  Medication Guide that was given to you and reviewed during todays visit.     (Tell all doctors including dentists that you are taking this medication)     Go to the emergency room or call 911 if:  -You have signs of allergic reaction:   -Rash, hives, itching.   -Swollen, blistered, peeling skin.   -Swelling of face, lips, mouth, tongue or throat.   -Tightness of chest, trouble breathing, swallowing or talking     Call your doctor:  - If IV / injection site gets red, warm, swollen, itchy or leaks fluid or pus.     (Leave dressing on your IV site for at least 2 hours and keep area clean and dry  - If you get sick or have symptoms of infection or are not feeling well for any reason.    (Wash your hands often, stay away from people who are sick)  - If you have side effects from your medication that do not go away or are bothersome.     (Refer to the teaching your nurse gave you for side effects to call your doctor about)    - Common side effects may include:  stuffy nose, headache, feeling tired, muscle aches, upset stomach  - Before receiving any vaccines     - Call the [facility] at   If:  - You get sick, are on antibiotics, have had a recent vaccine, have surgery or dental work and your doctor wants your visit rescheduled.  - You need to cancel and reschedule your visit for any reason. Call at least 2 days before your visit if you need to cancel.   - Your insurance changes before your next visit.    (We will need to get approval from your new insurance. This can take up to two weeks.)      The [facility] is opened Monday thru Friday. We are closed on weekends and holidays.   Voice mail will take your call if the center is closed. If you leave a message please allow 24 hours for a call back during weekdays. If you leave a message on a weekend/holiday, we will call you back the next business day.                   Please read the  Medication Guide that was given to you and reviewed during todays visit.     (Tell all doctors including dentists that you are taking this medication)     Go to the emergency room or call 911 if:  -You have signs of allergic reaction:   -Rash, hives, itching.   -Swollen, blistered, peeling skin.   -Swelling of face, lips, mouth, tongue or throat.   -Tightness of chest, trouble breathing, swallowing or talking     Call your doctor:  - If IV / injection site gets red, warm, swollen, itchy or leaks fluid or pus.     (Leave dressing on your IV site for at least 2 hours and keep area clean and dry  - If you get sick or have symptoms of infection or are not feeling well for any reason.    (Wash your hands often, stay away from people who are sick)  - If you have side effects from your medication that do not go away or are bothersome.     (Refer to the teaching your nurse gave you for side effects to call your doctor about)    - Common side effects may include:  stuffy nose, headache, feeling tired, muscle aches, upset stomach  - Before receiving any vaccines     - Call the [facility] at   If:  - You get sick, are on antibiotics, have had a recent vaccine, have surgery or dental work and your doctor wants your visit rescheduled.  - You need to cancel and reschedule your visit for any reason. Call at least 2 days before your visit if you need to cancel.   - Your insurance changes before your next visit.    (We will need to get approval from your new insurance. This can take up to two weeks.)     [facility] is opened Monday thru  Friday. We are closed on weekends and holidays.   Voice mail will take your call if the center is closed. If you leave a message please allow 24 hours for a call back during weekdays. If you leave a message on a weekend/holiday, we will call you back the next business day.             You can access the FollowMyHealth Patient Portal offered by [facility] by registering at the following website: [email or web address]. By joining [facility]’s FollowMyHealth portal, you will also be able to view your health information using other applications (apps) compatible with our system.

## 2024-06-06 NOTE — PROGRESS NOTES
[facility]   Infusion Clinic Note   Date: 2024   Name: Heather Thurston  : 1950   MRN: [id number]         Reason for Visit: OP Infusion (Cimzia)         Today: We administered certolizumab pegol.       Visit Type: INJECTION-Every 28 days       Ordered By: Dr. Lyle      Accompanied by:Self      Diagnosis: Rheumatoid arthritis of multiple sites without rheumatoid factor (Multi)       Allergies:   Allergies as of 2024 - Reviewed 2024   Allergen Reaction Noted    Atropine Anaphylaxis, Nausea And Vomiting, Palpitations, and Rash 2023    Codeine Itching and Unknown 2023    Ezetimibe Unknown 2023    Iodinated contrast media Other and Syncope 2023    Iodine Nausea/vomiting 10/16/2019    Lisinopril Cough 2023    Metformin hcl Diarrhea 2023    Simvastatin Unknown 2023    Adhesive tape-silicones Rash 2023         Current Medications has a current medication list which includes the following prescription(s): amitriptyline, ascorbic acid, calcium carbonate, calcium citrate, cimzia, cholecalciferol, cyanocobalamin, cyclobenzaprine, prolia, gabapentin, glimepiride, leflunomide, multivit-minerals/folic acid, omeprazole, oxybutynin, sertraline, sucralfate, sulfasalazine, and tramadol, and the following Facility-Administered Medications: [START ON 2024] cyanocobalamin.       Vitals:   Vitals:    24 1109   BP: 141/78   Pulse: 81   Resp: 16   Temp: 37.1 °C (98.8 °F)   TempSrc: Temporal   SpO2: 96%   PainSc:   6   PainLoc: Generalized             Infusion Pre-procedure Checklist:   - Allergies reviewed: yes   - Medications reviewed: yes       - Previous reaction to current treatment: no      Assess patient for the concerns below. Document provider notification as appropriate.  - Active or recent infection with/without current antibiotic use: no  - Recent or planned invasive dental work: no  - Recent or planned surgeries:  "no  - Recently received or plans to receive vaccinations: no  - Has treatment related toxicities: no  - Is pregnant:  n/a      Pain: 6   - Is the pain different from normal: no   - Is the pain tolerable: n/a   - Is your Doctor aware:  n/a      Labs: N/A         Fall Risk Screening: More Fall Risk  History of Falling, Immediate or Within 3 Months: No  Secondary Diagnosis: Yes  Ambulatory Aid: Walks without aid/bedrest/nurse assist  Intravenous Therapy/Heparin Lock: No  Gait/Transferring: Normal/bedrest/immobile  Mental Status: Oriented to own ability  More Fall Risk Score: 15       Review Of Systems:  Review of Systems   Musculoskeletal:  Positive for arthralgias and myalgias.        Chronic generalized pain   All other systems reviewed and are negative.        Infusion Readiness:   - Assessment Concerns Related to Infusion: No  - Provider notified: no      Document Below Only If Indicated:   New Patient Education:    N/A (returning patient for continuation of therapy. Ongoing education provided as needed.)        Treatment Conditions & Drug Specific Questions:    Certolizumab Pegol  (CIMZIA)    (Unless otherwise specified on patient specific therapy plan):     TREATMENT CONDITIONS:  Unless otherwise specified on patient specific therapy plan HOLD treatment and notify provider prior to proceeding with today's infusion if patient with a:  o Positive Hepatitis B Surface Ag  o Positive T-Spot  o NO baseline CBC-D obtained  o NO baseline CMP obtained    No results found for: \"HAGCN\", \"HEPBSURABI\", \"HBSAG\", \"XHAGF\", \"HEPBSAG\", \"EXTHEPBSAG\", \"NONUHSWGH\"   No results found for: \"NONUHFIRE\", \"NONUHSWGH\", \"NONUHFISH\", \"EXTHEPBSAG\"  No results found for: \"TBSIN\", \"TBGRES\", \"QFG\", \"TSPOTR\"   Lab Results   Component Value Date    WBC 5.8 11/22/2022    HGB 11.0 (L) 11/22/2022    HCT 36.2 11/22/2022    MCV 85.0 11/22/2022     11/22/2022        Chemistry    Lab Results   Component Value Date/Time     05/23/2023 1233 "    K 4.1 05/23/2023 1233     05/23/2023 1233    CO2 29 05/23/2023 1233    BUN 11 05/23/2023 1233    CREATININE 0.8 05/23/2023 1233    Lab Results   Component Value Date/Time    CALCIUM 10.0 05/23/2023 1233    ALKPHOS 56 07/21/2022 1116    AST 20 07/21/2022 1116    ALT 10 07/21/2022 1116    BILITOT 0.2 07/21/2022 1116             Labs reviewed and patient meets treatment conditions? Yes    DRUG SPECIFIC QUESTIONS:  - Latex Allergy? No     (PFS may include Latex)    - History of heart failure? No     (may exacerbate)      REMINDERS:  Recommended Vitals/Observation:  Vitals: Obtain vitals prior to injection.  Observation: Patient may leave immediately following injection.        Weight Based Drug Calculations:    WEIGHT BASED DRUGS: NOT APPLICABLE / FLAT DOSE    1155 Patient tolerated injections x 2 well. No s/s adverse reaction noted.  RTC on 7/5/2024        Initiated By: Deja Pollack RN

## 2024-06-12 ENCOUNTER — APPOINTMENT (OUTPATIENT)
Dept: SURGERY | Facility: CLINIC | Age: 74
End: 2024-06-12
Payer: MEDICARE

## 2024-06-12 DIAGNOSIS — K90.9 INTESTINAL MALABSORPTION, UNSPECIFIED TYPE (HHS-HCC): ICD-10-CM

## 2024-06-13 ENCOUNTER — OFFICE VISIT (OUTPATIENT)
Dept: SURGERY | Facility: CLINIC | Age: 74
End: 2024-06-13
Payer: MEDICARE

## 2024-06-13 DIAGNOSIS — E53.8 VITAMIN B 12 DEFICIENCY: Primary | ICD-10-CM

## 2024-06-13 PROCEDURE — 1126F AMNT PAIN NOTED NONE PRSNT: CPT

## 2024-06-13 PROCEDURE — 96372 THER/PROPH/DIAG INJ SC/IM: CPT

## 2024-06-13 ASSESSMENT — PAIN SCALES - GENERAL: PAINLEVEL: 0-NO PAIN

## 2024-06-24 ENCOUNTER — ANESTHESIA EVENT (OUTPATIENT)
Dept: OPERATING ROOM | Facility: HOSPITAL | Age: 74
End: 2024-06-24
Payer: MEDICARE

## 2024-06-24 ENCOUNTER — APPOINTMENT (OUTPATIENT)
Dept: INFUSION THERAPY | Facility: CLINIC | Age: 74
End: 2024-06-24
Payer: MEDICARE

## 2024-06-24 RX ORDER — SODIUM CHLORIDE, SODIUM LACTATE, POTASSIUM CHLORIDE, CALCIUM CHLORIDE 600; 310; 30; 20 MG/100ML; MG/100ML; MG/100ML; MG/100ML
100 INJECTION, SOLUTION INTRAVENOUS CONTINUOUS
Status: CANCELLED | OUTPATIENT
Start: 2024-06-24

## 2024-06-25 ENCOUNTER — ANESTHESIA (OUTPATIENT)
Dept: OPERATING ROOM | Facility: HOSPITAL | Age: 74
End: 2024-06-25
Payer: MEDICARE

## 2024-06-25 ENCOUNTER — HOSPITAL ENCOUNTER (OUTPATIENT)
Dept: OPERATING ROOM | Facility: HOSPITAL | Age: 74
Setting detail: OUTPATIENT SURGERY
Discharge: HOME | End: 2024-06-25
Payer: MEDICARE

## 2024-06-25 VITALS
HEIGHT: 63 IN | WEIGHT: 156.97 LBS | RESPIRATION RATE: 16 BRPM | SYSTOLIC BLOOD PRESSURE: 169 MMHG | DIASTOLIC BLOOD PRESSURE: 85 MMHG | TEMPERATURE: 96.8 F | BODY MASS INDEX: 27.81 KG/M2 | OXYGEN SATURATION: 95 % | HEART RATE: 82 BPM

## 2024-06-25 DIAGNOSIS — K21.9 GASTROESOPHAGEAL REFLUX DISEASE, UNSPECIFIED WHETHER ESOPHAGITIS PRESENT: ICD-10-CM

## 2024-06-25 LAB — GLUCOSE BLD MANUAL STRIP-MCNC: 132 MG/DL (ref 74–99)

## 2024-06-25 PROCEDURE — 7100000010 HC PHASE TWO TIME - EACH INCREMENTAL 1 MINUTE: Performed by: ANESTHESIOLOGY

## 2024-06-25 PROCEDURE — 3700000002 HC GENERAL ANESTHESIA TIME - EACH INCREMENTAL 1 MINUTE: Performed by: ANESTHESIOLOGY

## 2024-06-25 PROCEDURE — 3700000001 HC GENERAL ANESTHESIA TIME - INITIAL BASE CHARGE: Performed by: ANESTHESIOLOGY

## 2024-06-25 PROCEDURE — 2500000004 HC RX 250 GENERAL PHARMACY W/ HCPCS (ALT 636 FOR OP/ED): Performed by: NURSE ANESTHETIST, CERTIFIED REGISTERED

## 2024-06-25 PROCEDURE — 99100 ANES PT EXTEME AGE<1 YR&>70: CPT | Performed by: ANESTHESIOLOGY

## 2024-06-25 PROCEDURE — 7100000009 HC PHASE TWO TIME - INITIAL BASE CHARGE: Performed by: ANESTHESIOLOGY

## 2024-06-25 PROCEDURE — 3600000007 HC OR TIME - EACH INCREMENTAL 1 MINUTE - PROCEDURE LEVEL TWO: Performed by: ANESTHESIOLOGY

## 2024-06-25 PROCEDURE — 82947 ASSAY GLUCOSE BLOOD QUANT: CPT

## 2024-06-25 PROCEDURE — A43239 PR EDG TRANSORAL BIOPSY SINGLE/MULTIPLE: Performed by: NURSE ANESTHETIST, CERTIFIED REGISTERED

## 2024-06-25 PROCEDURE — 3600000002 HC OR TIME - INITIAL BASE CHARGE - PROCEDURE LEVEL TWO: Performed by: ANESTHESIOLOGY

## 2024-06-25 PROCEDURE — 43235 EGD DIAGNOSTIC BRUSH WASH: CPT | Performed by: SURGERY

## 2024-06-25 PROCEDURE — 91038 ESOPH IMPED FUNCT TEST > 1HR: CPT | Performed by: SURGERY

## 2024-06-25 PROCEDURE — A43239 PR EDG TRANSORAL BIOPSY SINGLE/MULTIPLE: Performed by: ANESTHESIOLOGY

## 2024-06-25 PROCEDURE — 91010 ESOPHAGUS MOTILITY STUDY: CPT

## 2024-06-25 PROCEDURE — 91010 ESOPHAGUS MOTILITY STUDY: CPT | Performed by: SURGERY

## 2024-06-25 RX ORDER — PROPOFOL 10 MG/ML
INJECTION, EMULSION INTRAVENOUS AS NEEDED
Status: DISCONTINUED | OUTPATIENT
Start: 2024-06-25 | End: 2024-06-25

## 2024-06-25 RX ORDER — ESMOLOL HYDROCHLORIDE 10 MG/ML
INJECTION INTRAVENOUS AS NEEDED
Status: DISCONTINUED | OUTPATIENT
Start: 2024-06-25 | End: 2024-06-25

## 2024-06-25 RX ORDER — SODIUM CHLORIDE, SODIUM LACTATE, POTASSIUM CHLORIDE, CALCIUM CHLORIDE 600; 310; 30; 20 MG/100ML; MG/100ML; MG/100ML; MG/100ML
INJECTION, SOLUTION INTRAVENOUS CONTINUOUS PRN
Status: DISCONTINUED | OUTPATIENT
Start: 2024-06-25 | End: 2024-06-25

## 2024-06-25 RX ORDER — PROPOFOL 10 MG/ML
INJECTION, EMULSION INTRAVENOUS CONTINUOUS PRN
Status: DISCONTINUED | OUTPATIENT
Start: 2024-06-25 | End: 2024-06-25

## 2024-06-25 SDOH — HEALTH STABILITY: MENTAL HEALTH: CURRENT SMOKER: 0

## 2024-06-25 ASSESSMENT — PAIN SCALES - GENERAL
PAINLEVEL_OUTOF10: 0 - NO PAIN

## 2024-06-25 ASSESSMENT — PAIN - FUNCTIONAL ASSESSMENT
PAIN_FUNCTIONAL_ASSESSMENT: 0-10

## 2024-06-25 NOTE — NURSING NOTE
Patient pulse ox intermittent  between 89-94%. Returned to recovery room  with PACU Nurse. Will discharge when from recovery room. Patient denies any complaints at present.

## 2024-06-25 NOTE — ANESTHESIA POSTPROCEDURE EVALUATION
Patient: Heather Duranmysteve    Procedure Summary       Date: 06/25/24 Room / Location: [facility] OR    Anesthesia Start: 0814 Anesthesia Stop: 0838    Procedure: EGD Diagnosis: Gastroesophageal reflux disease, unspecified whether esophagitis present    Scheduled Providers: Martinez Song MD; Jaylen Thompson DO Responsible Provider: Jaylen Thompson DO    Anesthesia Type: MAC, other ASA Status: 3            Anesthesia Type: MAC, other    Vitals Value Taken Time   /85 06/25/24 0908   Temp 36 °C (96.8 °F) 06/25/24 0838   Pulse 82 06/25/24 0908   Resp 16 06/25/24 0908   SpO2 95 % 06/25/24 0908       Anesthesia Post Evaluation    Patient location during evaluation: PACU  Patient participation: complete - patient participated  Level of consciousness: awake and alert  Pain management: adequate  Airway patency: patent  Cardiovascular status: acceptable and blood pressure returned to baseline  Respiratory status: acceptable  Hydration status: acceptable  Postoperative Nausea and Vomiting: none        No notable events documented.

## 2024-06-25 NOTE — H&P
Bariatric/General Surgery H&P    6/25/2024 8:13 AM  Heather Thurston  [id number]    Heather Thurston is a 74 y.o. year old female with burning pain s/p remote RYGB. Normal prior EGD.     PAST MEDICAL HISTORY:  Past Medical History:   Diagnosis Date    Adrenal mass     '05 to '14 no change by CT    Anxiety 08/26/2023    Chronic arthritis     tramadol prn    COVID     CVA (cerebral vascular accident)     DJD (degenerative joint disease)     Epigastric pain 11/22/2022    Fibromyalgia     GERD (gastroesophageal reflux disease)     Gout     HLD (hyperlipidemia)     HTN (hypertension)     htn ecg nsr    Intestinal malabsorption (HHS-HCC) 08/26/2023    Osteoporosis     DEXA OSTEOPOROSIS pROLIA    Overactive bladder 08/26/2023    Personal history of other diseases of the musculoskeletal system and connective tissue     History of rheumatoid arthritis    Plantar fasciitis of left foot     Rheumatoid arthritis of multiple sites without rheumatoid factor (Multi) 11/08/2023    Sinusitis 02/29/2024    Thyroid nodule     10/22  ENT  eval neg.        PAST SURGICAL HISTORY:  Past Surgical History:   Procedure Laterality Date    EYE SURGERY      LOWER EYELID, LID RAISE    LAPAROSCOPIC GASTRIC BANDING  05/21/2007    LAPAROSCOPIC ADJUSTABLE GASTRIC BAND UTILIZING 11 CM VANGUARD BAND, ENTEROLYSIS ([facility])    OTHER SURGICAL HISTORY  12/19/2022    Knee replacement    OTHER SURGICAL HISTORY  12/19/2022    Carpal tunnel surgery    OTHER SURGICAL HISTORY  12/19/2022    Cholecystectomy    OTHER SURGICAL HISTORY  12/19/2022    Hysterectomy    TOTAL KNEE ARTHROPLASTY      LEFT TOTAL KNEE ARTHROPLASTY (DR. DOUGLAS)    TOTAL KNEE ARTHROPLASTY  2015    RT TKA (DR. HARRIS)       FAMILY HISTORY:  Family History   Problem Relation Name Age of Onset    Lung cancer Mother      Liver cancer Father      Diabetes Father      Hypertension Father      Hypertension Brother      Obesity Brother      Diabetes Brother      Obesity Daughter       Hypertension Daughter      Diabetes Daughter          SOCIAL HISTORY:  Social History     Tobacco Use    Smoking status: Never     Passive exposure: Never    Smokeless tobacco: Never   Vaping Use    Vaping status: Never Used   Substance Use Topics    Alcohol use: Never    Drug use: Never       MEDICATIONS:  Prior to Admission Medications:  [facility]    ALLERGIES:  Allergies   Allergen Reactions    Atropine Anaphylaxis, Nausea And Vomiting, Palpitations and Rash     HEART RACING    Codeine Itching and Unknown    Ezetimibe Unknown    Iodinated Contrast Media Other and Syncope     SYNCOPE    NAUSEA    VOMITING    Iodine Nausea/vomiting    Lisinopril Cough    Metformin Hcl Diarrhea    Simvastatin Unknown    Adhesive Tape-Silicones Rash       REVIEW OF SYSTEMS:  GENERAL: Negative for malaise, significant weight loss and fever  NECK: Negative for lumps, goiter, pain and significant neck swelling  RESPIRATORY: Negative for cough, wheezing or shortness of breath.  CARDIOVASCULAR: Negative for chest pain, leg swelling or palpitations.  GI: Negative for abdominal discomfort, blood in stools or black stools or change in bowel habits  : No history of dysuria, frequency or incontinence  MUSCULOSKELETAL: Negative for joint pain or swelling, back pain or muscle pain.  SKIN: Negative for lesions, rash, and itching.  PSYCH: Negative for sleep disturbance, mood disorder and recent psychosocial stressors.  ENDOCRINE: Negative for cold or heat intolerance, polyuria, polydipsia and goiter.    PHYSICAL EXAM:  Vitals:    06/25/24 0644   BP: 144/71   Pulse: 65   Resp: 16   Temp: 36.3 °C (97.3 °F)   SpO2: 98%     General appearance: obese  Skin: warm, no erythema or rashes  Lungs: clear to percussion and auscultation  Heart: regular rhythm and S1, S2 normal  Abdomen: soft, non-tender, no masses, no organomegaly  Extremities: Normal exam of the extremities. No swelling or pain.    IMPRESSION:  Heather Thurston is a 74 y.o. female with  burning s/p remote RYGB.    PLAN:  Endoscopy, esophageal manometry catheter placement today.    The risks of the procedure including bleeding, perforation, need for further procedure and death have been explained to the patient and Heather Thurston has expressed understanding and acceptance of them. Consent has been signed.    Martinez Song MD  Bariatric and Minimally Invasive General Surgery

## 2024-06-25 NOTE — ANESTHESIA PREPROCEDURE EVALUATION
Patient: Heather Thurston    Procedure Information       Date/Time: 06/25/24 0805    Scheduled providers: Martinez Song MD; Jaylen Thompson DO    Procedure: EGD    Location: [facility] OR            Relevant Problems   Anesthesia (within normal limits)      Cardiac   (+) Benign hypertension   (+) Pure hypercholesterolemia      Neuro   (+) Anxiety   (+) Cervical radiculitis   (+) Polyneuropathy, unspecified      GI   (+) Dysphagia   (+) Gastroesophageal reflux disease      Liver   (+) Hepatitis      Endocrine   (+) Hyperparathyroidism (Multi)   (+) Hypothyroid   (+) Morbid (severe) obesity due to excess calories (Multi)   (+) Type 2 diabetes mellitus with hyperglycemia (Multi)   (+) Type 2 diabetes mellitus without complication (Multi)      Musculoskeletal   (+) Chronic pain syndrome   (+) Fibromyalgia   (+) Rheumatoid arthritis of multiple sites without rheumatoid factor (Multi)   (+) Spinal stenosis of lumbar region     Vitals:    06/25/24 0644   BP: 144/71   Pulse: 65   Resp: 16   Temp: 36.3 °C (97.3 °F)   SpO2: 98%       Past Surgical History:   Procedure Laterality Date    EYE SURGERY      LOWER EYELID, LID RAISE    LAPAROSCOPIC GASTRIC BANDING  05/21/2007    LAPAROSCOPIC ADJUSTABLE GASTRIC BAND UTILIZING 11 CM VANGUARD BAND, ENTEROLYSIS (ELISE-NATIVIDAD)    OTHER SURGICAL HISTORY  12/19/2022    Knee replacement    OTHER SURGICAL HISTORY  12/19/2022    Carpal tunnel surgery    OTHER SURGICAL HISTORY  12/19/2022    Cholecystectomy    OTHER SURGICAL HISTORY  12/19/2022    Hysterectomy    TOTAL KNEE ARTHROPLASTY      LEFT TOTAL KNEE ARTHROPLASTY (DR. DOUGLAS)    TOTAL KNEE ARTHROPLASTY  2015    RT TKA (DR. HARRIS)     Past Medical History:   Diagnosis Date    Adrenal mass     '05 to '14 no change by CT    Anxiety 08/26/2023    Chronic arthritis     tramadol prn    COVID     CVA (cerebral vascular accident)     DJD (degenerative joint disease)     Epigastric pain 11/22/2022    Fibromyalgia     GERD  (gastroesophageal reflux disease)     Gout     HLD (hyperlipidemia)     HTN (hypertension)     htn ecg nsr    Intestinal malabsorption (HHS-HCC) 08/26/2023    Osteoporosis     DEXA OSTEOPOROSIS pROLIA    Overactive bladder 08/26/2023    Personal history of other diseases of the musculoskeletal system and connective tissue     History of rheumatoid arthritis    Plantar fasciitis of left foot     Rheumatoid arthritis of multiple sites without rheumatoid factor (Multi) 11/08/2023    Sinusitis 02/29/2024    Thyroid nodule     10/22  ENT  eval neg.       Current Outpatient Medications:     amitriptyline (Elavil) 10 mg tablet, Take 2 tablets (20 mg) by mouth once daily at bedtime., Disp: , Rfl:     ascorbic acid (Vitamin C) 250 MG chewable tablet, Chew 1 tablet (250 mg). Gummies dose unknown, Disp: , Rfl:     calcium carbonate (Tums) 200 mg calcium chewable tablet, Chew 1 tablet (500 mg) once daily., Disp: , Rfl:     certolizumab pegol (Cimzia) injection, 2 mL (400 mg) every 30 (thirty) days., Disp: , Rfl:     cholecalciferol (Vitamin D3) 1,250 mcg (50,000 unit) tablet, Take 1 capsule by mouth 1 (one) time per week., Disp: , Rfl:     cyanocobalamin (Vitamin B-12) 1,000 mcg/mL injection, Inject into the shoulder, thigh, or buttocks every 30 (thirty) days., Disp: , Rfl:     gabapentin (Neurontin) 300 mg capsule, Take 1 capsule (300 mg) by mouth 3 times a day., Disp: , Rfl:     glimepiride (Amaryl) 2 mg tablet, Take 0.5 tablets (1 mg) by mouth once daily in the morning. Take before meals., Disp: , Rfl:     leflunomide (Arava) 10 mg tablet, Take 1 tablet (10 mg) by mouth once daily., Disp: , Rfl:     multivit-minerals/folic acid (ADULT ONE DAILY MULTIVITAMIN ORAL), Take by mouth., Disp: , Rfl:     omeprazole (PriLOSEC) 40 mg DR capsule, Take 1 capsule (40 mg) by mouth 2 times a day before meals., Disp: , Rfl:     oxybutynin (Ditropan) 5 mg tablet, Take 1 tablet (5 mg) by mouth once daily., Disp: 90 tablet, Rfl: 2     sertraline (Zoloft) 50 mg tablet, Take 1 tablet (50 mg) by mouth once daily., Disp: 90 tablet, Rfl: 3    traMADol (Ultram) 50 mg tablet, Take 1 tablet (50 mg) by mouth once daily as needed for moderate pain (4 - 6) (OA/RA)., Disp: 90 tablet, Rfl: 0    CALCIUM CITRATE ORAL, Take 500 mg by mouth 2 times a day., Disp: , Rfl:     cyclobenzaprine (Flexeril) 10 mg tablet, Take 1 tablet (10 mg) by mouth as needed at bedtime., Disp: , Rfl:     denosumab (Prolia) 60 mg/mL syringe, Inject under the skin. As directed, Disp: , Rfl:     sucralfate (Carafate) 1 gram tablet, Take 1 tablet (1 g) by mouth. ON AN EMPTY STOMACH, CRUSH 3 TIMES DAILY, Disp: , Rfl:     sulfaSALAzine (Azulfidine) 500 mg tablet, Take 1 tablet (500 mg) by mouth 2 times a day., Disp: , Rfl:   No current facility-administered medications for this encounter.    Facility-Administered Medications Ordered in Other Encounters:     lactated Ringer's infusion, , intravenous, Continuous PRN, Bobbi Ibarra, APRN-CRNA, New Bag at 06/25/24 0726  Prior to Admission medications    Medication Sig Start Date End Date Taking? Authorizing Provider   amitriptyline (Elavil) 10 mg tablet Take 2 tablets (20 mg) by mouth once daily at bedtime.   Yes Historical Provider, MD   ascorbic acid (Vitamin C) 250 MG chewable tablet Chew 1 tablet (250 mg). Gummies dose unknown   Yes Historical Provider, MD   calcium carbonate (Tums) 200 mg calcium chewable tablet Chew 1 tablet (500 mg) once daily.   Yes Historical Provider, MD   certolizumab pegol (Cimzia) injection 2 mL (400 mg) every 30 (thirty) days.   Yes Historical Provider, MD   cholecalciferol (Vitamin D3) 1,250 mcg (50,000 unit) tablet Take 1 capsule by mouth 1 (one) time per week. 9/19/17  Yes Historical Provider, MD   cyanocobalamin (Vitamin B-12) 1,000 mcg/mL injection Inject into the shoulder, thigh, or buttocks every 30 (thirty) days.   Yes Historical Provider, MD   gabapentin (Neurontin) 300 mg capsule Take 1 capsule (300 mg) by  mouth 3 times a day.   Yes Historical Provider, MD   glimepiride (Amaryl) 2 mg tablet Take 0.5 tablets (1 mg) by mouth once daily in the morning. Take before meals. 2/23/23  Yes Historical Provider, MD   leflunomide (Arava) 10 mg tablet Take 1 tablet (10 mg) by mouth once daily.   Yes Historical Provider, MD   multivit-minerals/folic acid (ADULT ONE DAILY MULTIVITAMIN ORAL) Take by mouth.   Yes Historical Provider, MD   omeprazole (PriLOSEC) 40 mg DR capsule Take 1 capsule (40 mg) by mouth 2 times a day before meals. 9/8/22  Yes Historical Provider, MD   oxybutynin (Ditropan) 5 mg tablet Take 1 tablet (5 mg) by mouth once daily. 2/7/24  Yes Tom Lyle MD   sertraline (Zoloft) 50 mg tablet Take 1 tablet (50 mg) by mouth once daily. 4/25/24 4/25/25 Yes Wily Thomas,    traMADol (Ultram) 50 mg tablet Take 1 tablet (50 mg) by mouth once daily as needed for moderate pain (4 - 6) (OA/RA). 12/5/23 8/31/24 Yes Tom Lyle MD   CALCIUM CITRATE ORAL Take 500 mg by mouth 2 times a day. 12/7/18   Historical Provider, MD   cyclobenzaprine (Flexeril) 10 mg tablet Take 1 tablet (10 mg) by mouth as needed at bedtime.    Historical Provider, MD   denosumab (Prolia) 60 mg/mL syringe Inject under the skin. As directed    Historical Provider, MD   sucralfate (Carafate) 1 gram tablet Take 1 tablet (1 g) by mouth. ON AN EMPTY STOMACH, CRUSH 3 TIMES DAILY    Historical Provider, MD   sulfaSALAzine (Azulfidine) 500 mg tablet Take 1 tablet (500 mg) by mouth 2 times a day. 5/10/21   Historical Provider, MD     Allergies   Allergen Reactions    Atropine Anaphylaxis, Nausea And Vomiting, Palpitations and Rash     HEART RACING    Codeine Itching and Unknown    Ezetimibe Unknown    Iodinated Contrast Media Other and Syncope     SYNCOPE    NAUSEA    VOMITING    Iodine Nausea/vomiting    Lisinopril Cough    Metformin Hcl Diarrhea    Simvastatin Unknown    Adhesive Tape-Silicones Rash     Social History     Tobacco Use    Smoking  status: Never     Passive exposure: Never    Smokeless tobacco: Never   Substance Use Topics    Alcohol use: Never         Chemistry    Lab Results   Component Value Date/Time     05/23/2023 1233    K 4.1 05/23/2023 1233     05/23/2023 1233    CO2 29 05/23/2023 1233    BUN 11 05/23/2023 1233    CREATININE 0.8 05/23/2023 1233    Lab Results   Component Value Date/Time    CALCIUM 10.0 05/23/2023 1233    ALKPHOS 56 07/21/2022 1116    AST 20 07/21/2022 1116    ALT 10 07/21/2022 1116    BILITOT 0.2 07/21/2022 1116          Lab Results   Component Value Date/Time    WBC 5.8 11/22/2022 1400    HGB 11.0 (L) 11/22/2022 1400    HCT 36.2 11/22/2022 1400     11/22/2022 1400     Lab Results   Component Value Date/Time    PROTIME 11.3 07/05/2018 1028    INR 1.0 07/05/2018 1028     No results found for this or any previous visit (from the past 4464 hour(s)).  No results found for this or any previous visit from the past 1095 days.      Clinical information reviewed:   Tobacco  Allergies  Meds   Med Hx  Surg Hx   Fam Hx  Soc Hx        NPO Detail:  NPO/Void Status  Date of Last Liquid: 06/24/24  Time of Last Liquid: 2330  Date of Last Solid: 06/24/24  Time of Last Solid: 2200  Time of Last Void: 0732         Physical Exam    Airway  Mallampati: III  TM distance: >3 FB  Neck ROM: full     Cardiovascular   Rhythm: regular     Dental   Comments: Permanent bridges   Pulmonary   Breath sounds clear to auscultation     Abdominal            Anesthesia Plan    History of general anesthesia?: yes  History of complications of general anesthesia?: no    ASA 3     MAC and other     The patient is not a current smoker.    intravenous induction   Anesthetic plan and risks discussed with patient.    Plan discussed with CRNA.

## 2024-07-02 ENCOUNTER — TELEPHONE (OUTPATIENT)
Dept: SURGERY | Facility: CLINIC | Age: 74
End: 2024-07-02
Payer: MEDICARE

## 2024-07-05 ENCOUNTER — APPOINTMENT (OUTPATIENT)
Dept: INFUSION THERAPY | Facility: CLINIC | Age: 74
End: 2024-07-05
Payer: MEDICARE

## 2024-07-05 VITALS
BODY MASS INDEX: 27.37 KG/M2 | SYSTOLIC BLOOD PRESSURE: 121 MMHG | WEIGHT: 154.5 LBS | RESPIRATION RATE: 16 BRPM | TEMPERATURE: 98 F | HEART RATE: 71 BPM | OXYGEN SATURATION: 94 % | DIASTOLIC BLOOD PRESSURE: 69 MMHG

## 2024-07-05 DIAGNOSIS — M06.09 RHEUMATOID ARTHRITIS OF MULTIPLE SITES WITHOUT RHEUMATOID FACTOR (MULTI): ICD-10-CM

## 2024-07-05 RX ORDER — EPINEPHRINE 0.3 MG/.3ML
0.3 INJECTION SUBCUTANEOUS EVERY 5 MIN PRN
OUTPATIENT
Start: 2024-08-01

## 2024-07-05 RX ORDER — ALBUTEROL SULFATE 0.83 MG/ML
3 SOLUTION RESPIRATORY (INHALATION) AS NEEDED
OUTPATIENT
Start: 2024-08-01

## 2024-07-05 RX ORDER — DIPHENHYDRAMINE HYDROCHLORIDE 50 MG/ML
50 INJECTION INTRAMUSCULAR; INTRAVENOUS AS NEEDED
OUTPATIENT
Start: 2024-08-01

## 2024-07-05 RX ORDER — FAMOTIDINE 10 MG/ML
20 INJECTION INTRAVENOUS ONCE AS NEEDED
OUTPATIENT
Start: 2024-08-01

## 2024-07-05 ASSESSMENT — PAIN SCALES - GENERAL: PAINLEVEL: 5

## 2024-07-05 ASSESSMENT — ENCOUNTER SYMPTOMS
FATIGUE: 1
MYALGIAS: 1
ARTHRALGIAS: 1

## 2024-07-05 NOTE — PROGRESS NOTES
[facility]   Infusion Clinic Note   Date: 2024   Name: Heather Thurston  : 1950   MRN: [id number]         Reason for Visit: Injections (Cimzia)         Today: We administered certolizumab pegol.       Visit Type: INJECTION       Ordered By: Dr Lyle      Accompanied by:Self      Diagnosis: Rheumatoid arthritis of multiple sites without rheumatoid factor (Multi)       Allergies:   Allergies as of 2024 - Reviewed 2024   Allergen Reaction Noted    Atropine Anaphylaxis, Nausea And Vomiting, Palpitations, and Rash 2023    Codeine Itching and Unknown 2023    Ezetimibe Unknown 2023    Iodinated contrast media Other and Syncope 2023    Iodine Nausea/vomiting 10/16/2019    Lisinopril Cough 2023    Metformin hcl Diarrhea 2023    Simvastatin Unknown 2023    Adhesive tape-silicones Rash 2023         Current Medications has a current medication list which includes the following prescription(s): amitriptyline, ascorbic acid, calcium carbonate, calcium citrate, cimzia, cholecalciferol, cyanocobalamin, cyclobenzaprine, prolia, gabapentin, glimepiride, leflunomide, multivit-minerals/folic acid, omeprazole, oxybutynin, sertraline, sucralfate, sulfasalazine, and tramadol.       Vitals:   Vitals:    24 0912   BP: 121/69   Pulse: 71   Resp: 16   Temp: 36.7 °C (98 °F)   SpO2: 94%   Weight: 70.1 kg (154 lb 8 oz)   PainSc:   5   PainLoc: Back             Infusion Pre-procedure Checklist:   - Allergies reviewed: yes   - Medications reviewed: yes       - Previous reaction to current treatment: no      Assess patient for the concerns below. Document provider notification as appropriate.  - Active or recent infection with/without current antibiotic use: no  - Recent or planned invasive dental work: no  - Recent or planned surgeries: no  - Recently received or plans to receive vaccinations: no  - Has treatment related toxicities: no  - Is  "pregnant:  n/a      Pain: 5   - Is the pain different from normal: no   - Is the pain tolerable: no   - Is your Doctor aware:  no      Labs: N/A         Fall Risk Screening: More Fall Risk  History of Falling, Immediate or Within 3 Months: No  Secondary Diagnosis: No  Ambulatory Aid: Walks without aid/bedrest/nurse assist  Intravenous Therapy/Heparin Lock: No  Gait/Transferring: Normal/bedrest/immobile  Mental Status: Oriented to own ability  More Fall Risk Score: 0       Review Of Systems:  Review of Systems   Constitutional:  Positive for fatigue.   Gastrointestinal:         C/O stomach burning, that has decreased her appetite. Has been having testing done for this.    Musculoskeletal:  Positive for arthralgias and myalgias.   Patient c/o feeling dizzy and blurry vision on arrival, states she hasn't eaten and feels as if her blood sugar is low. Patient provided with 15 grams carbohydrates, after patient ate, states her symptoms were relieved.       Infusion Readiness:   - Assessment Concerns Related to Infusion: No  - Provider notified: n/a      Document Below Only If Indicated:   New Patient Education:    N/A (returning patient for continuation of therapy. Ongoing education provided as needed.)        Treatment Conditions & Drug Specific Questions:    Certolizumab Pegol  (CIMZIA)    (Unless otherwise specified on patient specific therapy plan):     TREATMENT CONDITIONS:  Unless otherwise specified on patient specific therapy plan HOLD treatment and notify provider prior to proceeding with today's infusion if patient with a:  o Positive Hepatitis B Surface Ag  o Positive T-Spot  o NO baseline CBC-D obtained  o NO baseline CMP obtained    No results found for: \"HAGCN\", \"HEPBSURABI\", \"HBSAG\", \"XHAGF\", \"HEPBSAG\", \"EXTHEPBSAG\", \"NONUHSWGH\"   No results found for: \"NONUHFIRE\", \"NONUHSWGH\", \"NONUHFISH\", \"EXTHEPBSAG\"  No results found for: \"TBSIN\", \"TBGRES\", \"QFG\", \"TSPOTR\"   Lab Results   Component Value Date    WBC " 5.8 11/22/2022    HGB 11.0 (L) 11/22/2022    HCT 36.2 11/22/2022    MCV 85.0 11/22/2022     11/22/2022        Chemistry    Lab Results   Component Value Date/Time     05/23/2023 1233    K 4.1 05/23/2023 1233     05/23/2023 1233    CO2 29 05/23/2023 1233    BUN 11 05/23/2023 1233    CREATININE 0.8 05/23/2023 1233    Lab Results   Component Value Date/Time    CALCIUM 10.0 05/23/2023 1233    ALKPHOS 56 07/21/2022 1116    AST 20 07/21/2022 1116    ALT 10 07/21/2022 1116    BILITOT 0.2 07/21/2022 1116             Labs reviewed and patient meets treatment conditions? Yes    DRUG SPECIFIC QUESTIONS:  - Latex Allergy? No     (Caution: Prefilled syringe may include Latex)    - Any new or worsening signs / symptoms of heart failure which may include things like worsening shortness of breath, swelling, fatigue? No   (If yes notify provider before proceeding with today's infusion. New onset or worsening HF have been reported with infliximab)      REMINDERS:  Recommended Vitals/Observation:  Vitals: Obtain vitals prior to injection.  Observation: Patient may leave immediately following injection.        Weight Based Drug Calculations:    WEIGHT BASED DRUGS: NOT APPLICABLE / FLAT DOSE          Initiated By: Lashawn Jeffrey RN

## 2024-07-05 NOTE — PATIENT INSTRUCTIONS
Today :We administered certolizumab pegol.     For:   1. Rheumatoid arthritis of multiple sites without rheumatoid factor (Multi)         Your next appointment is due in:  7/28/2024        Please read the  Medication Guide that was given to you and reviewed during todays visit.     (Tell all doctors including dentists that you are taking this medication)     Go to the emergency room or call 911 if:  -You have signs of allergic reaction:   -Rash, hives, itching.   -Swollen, blistered, peeling skin.   -Swelling of face, lips, mouth, tongue or throat.   -Tightness of chest, trouble breathing, swallowing or talking     Call your doctor:  - If IV / injection site gets red, warm, swollen, itchy or leaks fluid or pus.     (Leave dressing on your IV site for at least 2 hours and keep area clean and dry  - If you get sick or have symptoms of infection or are not feeling well for any reason.    (Wash your hands often, stay away from people who are sick)  - If you have side effects from your medication that do not go away or are bothersome.     (Refer to the teaching your nurse gave you for side effects to call your doctor about)    - Common side effects may include:  stuffy nose, headache, feeling tired, muscle aches, upset stomach  - Before receiving any vaccines     - Call the [facility] at   If:  - You get sick, are on antibiotics, have had a recent vaccine, have surgery or dental work and your doctor wants your visit rescheduled.  - You need to cancel and reschedule your visit for any reason. Call at least 2 days before your visit if you need to cancel.   - Your insurance changes before your next visit.    (We will need to get approval from your new insurance. This can take up to two weeks.)     The [facility] is opened Monday thru Friday. We are closed on weekends and holidays.   Voice mail will take your call if the center is closed. If you leave a message please allow 24 hours for a  call back during weekdays. If you leave a message on a weekend/holiday, we will call you back the next business day.

## 2024-07-08 ENCOUNTER — LAB (OUTPATIENT)
Dept: LAB | Facility: LAB | Age: 74
End: 2024-07-08
Payer: MEDICARE

## 2024-07-08 DIAGNOSIS — E53.8 VITAMIN B 12 DEFICIENCY: ICD-10-CM

## 2024-07-08 DIAGNOSIS — K90.9 INTESTINAL MALABSORPTION, UNSPECIFIED TYPE (HHS-HCC): ICD-10-CM

## 2024-07-08 DIAGNOSIS — E03.9 ACQUIRED HYPOTHYROIDISM: ICD-10-CM

## 2024-07-08 DIAGNOSIS — E78.00 PURE HYPERCHOLESTEROLEMIA: ICD-10-CM

## 2024-07-08 DIAGNOSIS — E11.65 TYPE 2 DIABETES MELLITUS WITH HYPERGLYCEMIA, WITHOUT LONG-TERM CURRENT USE OF INSULIN (MULTI): ICD-10-CM

## 2024-07-08 DIAGNOSIS — I10 BENIGN HYPERTENSION: ICD-10-CM

## 2024-07-08 LAB
25(OH)D3 SERPL-MCNC: 25 NG/ML (ref 31–100)
ALBUMIN SERPL-MCNC: 4.3 G/DL (ref 3.5–5)
ALP BLD-CCNC: 42 U/L (ref 35–125)
ALT SERPL-CCNC: 7 U/L (ref 5–40)
ANION GAP SERPL CALC-SCNC: 9 MMOL/L
AST SERPL-CCNC: 14 U/L (ref 5–40)
BASOPHILS # BLD AUTO: 0.12 X10*3/UL (ref 0–0.1)
BASOPHILS NFR BLD AUTO: 1.7 %
BILIRUB SERPL-MCNC: 0.3 MG/DL (ref 0.1–1.2)
BUN SERPL-MCNC: 14 MG/DL (ref 8–25)
CALCIUM SERPL-MCNC: 9.8 MG/DL (ref 8.5–10.4)
CHLORIDE SERPL-SCNC: 101 MMOL/L (ref 97–107)
CHOLEST SERPL-MCNC: 184 MG/DL (ref 133–200)
CHOLEST/HDLC SERPL: 3.1 {RATIO}
CO2 SERPL-SCNC: 27 MMOL/L (ref 24–31)
CREAT SERPL-MCNC: 0.7 MG/DL (ref 0.4–1.6)
EGFRCR SERPLBLD CKD-EPI 2021: >90 ML/MIN/1.73M*2
EOSINOPHIL # BLD AUTO: 0.28 X10*3/UL (ref 0–0.4)
EOSINOPHIL NFR BLD AUTO: 4.1 %
ERYTHROCYTE [DISTWIDTH] IN BLOOD BY AUTOMATED COUNT: 18.4 % (ref 11.5–14.5)
EST. AVERAGE GLUCOSE BLD GHB EST-MCNC: 148 MG/DL
FERRITIN SERPL-MCNC: 17 NG/ML (ref 13–150)
FOLATE SERPL-MCNC: 15.8 NG/ML (ref 4.2–19.9)
GLUCOSE SERPL-MCNC: 234 MG/DL (ref 65–99)
HBA1C MFR BLD: 6.8 %
HCT VFR BLD AUTO: 31.5 % (ref 36–46)
HDLC SERPL-MCNC: 59 MG/DL
HGB BLD-MCNC: 8.9 G/DL (ref 12–16)
IMM GRANULOCYTES # BLD AUTO: 0.01 X10*3/UL (ref 0–0.5)
IMM GRANULOCYTES NFR BLD AUTO: 0.1 % (ref 0–0.9)
IRON SATN MFR SERPL: 5 % (ref 12–50)
IRON SERPL-MCNC: 28 UG/DL (ref 30–160)
LDLC SERPL CALC-MCNC: 101 MG/DL (ref 65–130)
LYMPHOCYTES # BLD AUTO: 2.01 X10*3/UL (ref 0.8–3)
LYMPHOCYTES NFR BLD AUTO: 29.1 %
MCH RBC QN AUTO: 22.4 PG (ref 26–34)
MCHC RBC AUTO-ENTMCNC: 28.3 G/DL (ref 32–36)
MCV RBC AUTO: 79 FL (ref 80–100)
MONOCYTES # BLD AUTO: 0.64 X10*3/UL (ref 0.05–0.8)
MONOCYTES NFR BLD AUTO: 9.3 %
NEUTROPHILS # BLD AUTO: 3.85 X10*3/UL (ref 1.6–5.5)
NEUTROPHILS NFR BLD AUTO: 55.7 %
NRBC BLD-RTO: 0 /100 WBCS (ref 0–0)
PLATELET # BLD AUTO: 246 X10*3/UL (ref 150–450)
POTASSIUM SERPL-SCNC: 4.4 MMOL/L (ref 3.4–5.1)
PROT SERPL-MCNC: 6.5 G/DL (ref 5.9–7.9)
PTH-INTACT SERPL-MCNC: 229.8 PG/ML (ref 18.5–88)
RBC # BLD AUTO: 3.98 X10*6/UL (ref 4–5.2)
SODIUM SERPL-SCNC: 137 MMOL/L (ref 133–145)
TIBC SERPL-MCNC: 536 UG/DL (ref 228–428)
TRIGL SERPL-MCNC: 118 MG/DL (ref 40–150)
TSH SERPL DL<=0.05 MIU/L-ACNC: 2.4 MIU/L (ref 0.27–4.2)
UIBC SERPL-MCNC: 508 UG/DL (ref 110–370)
VIT B12 SERPL-MCNC: 1404 PG/ML (ref 211–946)
WBC # BLD AUTO: 6.9 X10*3/UL (ref 4.4–11.3)

## 2024-07-08 PROCEDURE — 82728 ASSAY OF FERRITIN: CPT

## 2024-07-08 PROCEDURE — 82746 ASSAY OF FOLIC ACID SERUM: CPT

## 2024-07-08 PROCEDURE — 84425 ASSAY OF VITAMIN B-1: CPT

## 2024-07-08 PROCEDURE — 83540 ASSAY OF IRON: CPT

## 2024-07-08 PROCEDURE — 82306 VITAMIN D 25 HYDROXY: CPT

## 2024-07-08 PROCEDURE — 83550 IRON BINDING TEST: CPT

## 2024-07-08 PROCEDURE — 80061 LIPID PANEL: CPT

## 2024-07-08 PROCEDURE — 83036 HEMOGLOBIN GLYCOSYLATED A1C: CPT

## 2024-07-08 PROCEDURE — 80053 COMPREHEN METABOLIC PANEL: CPT

## 2024-07-08 PROCEDURE — 82525 ASSAY OF COPPER: CPT

## 2024-07-08 PROCEDURE — 84630 ASSAY OF ZINC: CPT

## 2024-07-08 PROCEDURE — 36415 COLL VENOUS BLD VENIPUNCTURE: CPT

## 2024-07-08 PROCEDURE — 84443 ASSAY THYROID STIM HORMONE: CPT

## 2024-07-08 PROCEDURE — 85025 COMPLETE CBC W/AUTO DIFF WBC: CPT

## 2024-07-08 PROCEDURE — 83970 ASSAY OF PARATHORMONE: CPT

## 2024-07-08 PROCEDURE — 82607 VITAMIN B-12: CPT

## 2024-07-09 ENCOUNTER — APPOINTMENT (OUTPATIENT)
Dept: PRIMARY CARE | Facility: CLINIC | Age: 74
End: 2024-07-09
Payer: MEDICARE

## 2024-07-10 LAB
COPPER SERPL-MCNC: 87.5 UG/DL (ref 80–155)
ZINC SERPL-MCNC: 67.4 UG/DL (ref 60–120)

## 2024-07-10 RX ORDER — CYANOCOBALAMIN 1000 UG/ML
1000 INJECTION, SOLUTION INTRAMUSCULAR; SUBCUTANEOUS ONCE
Status: COMPLETED | OUTPATIENT
Start: 2024-07-16 | End: 2024-07-16

## 2024-07-13 LAB — VIT B1 PYROPHOSHATE BLD-SCNC: 131 NMOL/L (ref 70–180)

## 2024-07-15 ENCOUNTER — OFFICE VISIT (OUTPATIENT)
Dept: PRIMARY CARE | Facility: CLINIC | Age: 74
End: 2024-07-15
Payer: MEDICARE

## 2024-07-15 VITALS
TEMPERATURE: 97.7 F | HEIGHT: 63 IN | DIASTOLIC BLOOD PRESSURE: 60 MMHG | HEART RATE: 80 BPM | WEIGHT: 164 LBS | BODY MASS INDEX: 29.06 KG/M2 | SYSTOLIC BLOOD PRESSURE: 122 MMHG | OXYGEN SATURATION: 99 %

## 2024-07-15 DIAGNOSIS — D64.9 ANEMIA, UNSPECIFIED TYPE: ICD-10-CM

## 2024-07-15 DIAGNOSIS — E11.65 TYPE 2 DIABETES MELLITUS WITH HYPERGLYCEMIA, WITHOUT LONG-TERM CURRENT USE OF INSULIN (MULTI): ICD-10-CM

## 2024-07-15 DIAGNOSIS — I10 BENIGN HYPERTENSION: Primary | ICD-10-CM

## 2024-07-15 PROBLEM — S92.902D: Status: ACTIVE | Noted: 2024-07-15

## 2024-07-15 PROCEDURE — 99214 OFFICE O/P EST MOD 30 MIN: CPT | Performed by: LICENSED PRACTICAL NURSE

## 2024-07-15 PROCEDURE — 3078F DIAST BP <80 MM HG: CPT | Performed by: LICENSED PRACTICAL NURSE

## 2024-07-15 PROCEDURE — 1159F MED LIST DOCD IN RCRD: CPT | Performed by: LICENSED PRACTICAL NURSE

## 2024-07-15 PROCEDURE — 1125F AMNT PAIN NOTED PAIN PRSNT: CPT | Performed by: LICENSED PRACTICAL NURSE

## 2024-07-15 PROCEDURE — 3044F HG A1C LEVEL LT 7.0%: CPT | Performed by: LICENSED PRACTICAL NURSE

## 2024-07-15 PROCEDURE — 1160F RVW MEDS BY RX/DR IN RCRD: CPT | Performed by: LICENSED PRACTICAL NURSE

## 2024-07-15 PROCEDURE — 3049F LDL-C 100-129 MG/DL: CPT | Performed by: LICENSED PRACTICAL NURSE

## 2024-07-15 PROCEDURE — 3074F SYST BP LT 130 MM HG: CPT | Performed by: LICENSED PRACTICAL NURSE

## 2024-07-15 PROCEDURE — 1036F TOBACCO NON-USER: CPT | Performed by: LICENSED PRACTICAL NURSE

## 2024-07-15 ASSESSMENT — PAIN SCALES - GENERAL: PAINLEVEL: 5

## 2024-07-15 ASSESSMENT — ENCOUNTER SYMPTOMS
FATIGUE: 1
EYE PAIN: 1

## 2024-07-15 NOTE — PROGRESS NOTES
[facility]  PROGRESS NOTE      Heather Thurston is a 74 y.o. female that is presenting today for Follow-up.      Subjective   Pt is a 74yr old female who presents to the office today for her 6 month follow up. Her PCP Dr. Lyle is retiring and she will be seeing me today. Ms. Shares that she is generally doing well aside from ongoing GI discomfort for which she has been recently followed by Gastroenterology, Dr. Walker. Ms. Thurston received an upper scope last month related to the GI discomfort. Recent EGD showed no bleeding, no other significant findings. No reports of blood in the stool or dark stools. She is scheduled to follow up with Dr. Walker next month. Aside from this Ms. Thurston reports left eye irritation due to an inverted lower eyelid. She shares that she plans to have surgery to correct this. Ms. Thurston reports that she has been generally fatigued but notes no other concerns.              Review of Systems   Constitutional:  Positive for fatigue.   Eyes:  Positive for pain.      Objective   Vitals:    07/15/24 1313   BP: 122/60   Pulse: 80   Temp: 36.5 °C (97.7 °F)   SpO2: 99%      Body mass index is 29.06 kg/m².  Physical Exam  Constitutional:       General: She is not in acute distress.     Appearance: She is not toxic-appearing.   Eyes:      Comments: Mild left eye lid inversion.    Cardiovascular:      Rate and Rhythm: Normal rate.   Pulmonary:      Effort: Pulmonary effort is normal. No respiratory distress.      Breath sounds: No wheezing or rales.   Skin:     General: Skin is warm and dry.       Diagnostic Results   Lab Results   Component Value Date    GLUCOSE 234 (H) 07/08/2024    CALCIUM 9.8 07/08/2024     07/08/2024    K 4.4 07/08/2024    CO2 27 07/08/2024     07/08/2024    BUN 14 07/08/2024    CREATININE 0.70 07/08/2024     Lab Results   Component Value Date    ALT 7 07/08/2024    AST 14 07/08/2024    ALKPHOS 42 07/08/2024     "BILITOT 0.3 07/08/2024     Lab Results   Component Value Date    WBC 6.9 07/08/2024    HGB 8.9 (L) 07/08/2024    HCT 31.5 (L) 07/08/2024    MCV 79 (L) 07/08/2024     07/08/2024     Lab Results   Component Value Date    CHOL 184 07/08/2024    CHOL 164 07/21/2022    CHOL 150 07/22/2021     Lab Results   Component Value Date    HDL 59.0 07/08/2024    HDL 59 07/21/2022    HDL 63 07/22/2021     Lab Results   Component Value Date    LDLCALC 101 07/08/2024    LDLCALC 91 07/21/2022    LDLCALC 74 07/22/2021     Lab Results   Component Value Date    TRIG 118 07/08/2024    TRIG 70 07/21/2022    TRIG 66 07/22/2021     No components found for: \"CHOLHDL\"  Lab Results   Component Value Date    HGBA1C 6.8 (H) 07/08/2024     Other labs not included in the list above were reviewed either before or during this encounter.    History    Past Medical History:   Diagnosis Date    Adrenal mass     '05 to '14 no change by CT    Anxiety 08/26/2023    Chronic arthritis     tramadol prn    COVID     CVA (cerebral vascular accident)     DJD (degenerative joint disease)     Epigastric pain 11/22/2022    Fibromyalgia     GERD (gastroesophageal reflux disease)     Gout     HLD (hyperlipidemia)     HTN (hypertension)     htn ecg nsr    Intestinal malabsorption (HHS-HCC) 08/26/2023    Osteoporosis     DEXA OSTEOPOROSIS pROLIA    Overactive bladder 08/26/2023    Personal history of other diseases of the musculoskeletal system and connective tissue     History of rheumatoid arthritis    Plantar fasciitis of left foot     Rheumatoid arthritis of multiple sites without rheumatoid factor (Multi) 11/08/2023    Sinusitis 02/29/2024    Thyroid nodule     10/22  ENT  eval neg.     Past Surgical History:   Procedure Laterality Date    EYE SURGERY      LOWER EYELID, LID RAISE    LAPAROSCOPIC GASTRIC BANDING  05/21/2007    LAPAROSCOPIC ADJUSTABLE GASTRIC BAND UTILIZING 11 CM VANGUARD BAND, ENTEROLYSIS ([facility])    OTHER SURGICAL HISTORY  " 12/19/2022    Knee replacement    OTHER SURGICAL HISTORY  12/19/2022    Carpal tunnel surgery    OTHER SURGICAL HISTORY  12/19/2022    Cholecystectomy    OTHER SURGICAL HISTORY  12/19/2022    Hysterectomy    TOTAL KNEE ARTHROPLASTY      LEFT TOTAL KNEE ARTHROPLASTY (DR. DOUGLAS)    TOTAL KNEE ARTHROPLASTY  2015    RT TKA (DR. HARRIS)     Family History   Problem Relation Name Age of Onset    Lung cancer Mother      Liver cancer Father      Diabetes Father      Hypertension Father      Hypertension Brother      Obesity Brother      Diabetes Brother      Obesity Daughter      Hypertension Daughter      Diabetes Daughter       Social History     Socioeconomic History    Marital status:      Spouse name: Not on file    Number of children: Not on file    Years of education: Not on file    Highest education level: Not on file   Occupational History    Not on file   Tobacco Use    Smoking status: Never     Passive exposure: Never    Smokeless tobacco: Never   Vaping Use    Vaping status: Never Used   Substance and Sexual Activity    Alcohol use: Never    Drug use: Never    Sexual activity: Not on file   Other Topics Concern    Not on file   Social History Narrative    Not on file     Social Determinants of Health     Financial Resource Strain: Not on file   Food Insecurity: Not on file   Transportation Needs: Not on file   Physical Activity: Not on file   Stress: Not on file   Social Connections: Not on file   Intimate Partner Violence: Not At Risk (3/21/2024)    Humiliation, Afraid, Rape, and Kick questionnaire     Fear of Current or Ex-Partner: No     Emotionally Abused: No     Physically Abused: No     Sexually Abused: No   Housing Stability: Not on file     Allergies   Allergen Reactions    Atropine Anaphylaxis, Nausea And Vomiting, Palpitations and Rash     HEART RACING    Codeine Itching and Unknown    Ezetimibe Unknown    Iodinated Contrast Media Other and Syncope     SYNCOPE    NAUSEA    VOMITING    Iodine  Nausea/vomiting    Lisinopril Cough    Metformin Hcl Diarrhea    Simvastatin Unknown    Adhesive Tape-Silicones Rash     Current Outpatient Medications on File Prior to Visit   Medication Sig Dispense Refill    amitriptyline (Elavil) 10 mg tablet Take 2 tablets (20 mg) by mouth once daily at bedtime.      ascorbic acid (Vitamin C) 250 MG chewable tablet Chew 1 tablet (250 mg). Gummies dose unknown      calcium carbonate (Tums) 200 mg calcium chewable tablet Chew 1 tablet (500 mg) once daily.      CALCIUM CITRATE ORAL Take 500 mg by mouth 2 times a day.      certolizumab pegol (Cimzia) injection 2 mL (400 mg) every 30 (thirty) days.      cholecalciferol (Vitamin D3) 1,250 mcg (50,000 unit) tablet Take 1 capsule by mouth 1 (one) time per week.      cyanocobalamin (Vitamin B-12) 1,000 mcg/mL injection Inject into the shoulder, thigh, or buttocks every 30 (thirty) days.      denosumab (Prolia) 60 mg/mL syringe Inject under the skin. As directed      gabapentin (Neurontin) 300 mg capsule Take 1 capsule (300 mg) by mouth 3 times a day.      glimepiride (Amaryl) 2 mg tablet Take 0.5 tablets (1 mg) by mouth once daily in the morning. Take before meals.      leflunomide (Arava) 10 mg tablet Take 1 tablet (10 mg) by mouth once daily.      multivit-minerals/folic acid (ADULT ONE DAILY MULTIVITAMIN ORAL) Take by mouth.      omeprazole (PriLOSEC) 40 mg DR capsule Take 1 capsule (40 mg) by mouth 2 times a day before meals.      oxybutynin (Ditropan) 5 mg tablet Take 1 tablet (5 mg) by mouth once daily. 90 tablet 2    sertraline (Zoloft) 50 mg tablet Take 1 tablet (50 mg) by mouth once daily. 90 tablet 3    sucralfate (Carafate) 1 gram tablet Take 1 tablet (1 g) by mouth. ON AN EMPTY STOMACH, CRUSH 3 TIMES DAILY      sulfaSALAzine (Azulfidine) 500 mg tablet Take 1 tablet (500 mg) by mouth 2 times a day.      traMADol (Ultram) 50 mg tablet Take 1 tablet (50 mg) by mouth once daily as needed for moderate pain (4 - 6) (OA/RA). 90  tablet 0    [DISCONTINUED] cyclobenzaprine (Flexeril) 10 mg tablet Take 1 tablet (10 mg) by mouth as needed at bedtime.       No current facility-administered medications on file prior to visit.     Immunization History   Administered Date(s) Administered    Flu vaccine, quadrivalent, high-dose, preservative free, age 65y+ (FLUZONE) 10/21/2020, 10/21/2021, 12/05/2023    Hepatitis B vaccine, adult *Check Product/Dose* 10/11/2005, 12/13/2005, 08/25/2006    Influenza, High Dose Seasonal, Preservative Free 10/07/2016, 11/14/2018, 01/08/2020    Influenza, Seasonal, Quadrivalent, Adjuvanted 10/18/2022    Influenza, injectable, quadrivalent 11/14/2017    Influenza, seasonal, injectable 11/01/2008, 01/04/2011, 10/19/2011, 10/01/2014    Influenza, trivalent, adjuvanted 10/05/2020, 10/20/2020, 10/22/2021    Novel influenza-H1N1-09, preservative-free 01/06/2010    Pfizer COVID-19 vaccine, bivalent, age 12 years and older (30 mcg/0.3 mL) 10/24/2022    Pfizer Purple Cap SARS-CoV-2 03/08/2021, 03/29/2021, 10/22/2021    Pneumococcal conjugate vaccine, 13-valent (PREVNAR 13) 09/01/2015    Pneumococcal conjugate vaccine, 20-valent (PREVNAR 20) 10/18/2022    Pneumococcal polysaccharide vaccine, 23-valent, age 2 years and older (PNEUMOVAX 23) 01/01/2002, 01/04/2011    Tdap vaccine, age 7 year and older (BOOSTRIX, ADACEL) 01/08/2020    Zoster vaccine, recombinant, adult (SHINGRIX) 11/01/2022    Zoster, live 09/07/2012     Patient's medical history was reviewed and updated either before or during this encounter.       Assessment/Plan   Problem List Items Addressed This Visit       Benign hypertension - Primary    Type 2 diabetes mellitus with hyperglycemia (Multi)    Anemia    Relevant Orders    CBC   Ms. Thurston is generally doing well. Her BP is well controlled and VS are stable. Recent blood work that was obtained by her PCP, prior to this exam shows that she is anemic H/H 8.9/31.5. This is likely why she is feeling fatigued. I will  have her call Dr. Walker to have her moved up due to a decreasing H/H and need for possible lower scope. I will repeat her CBC in 2 weeks.     Aside from this her other labs are generally well. Her A1C is 6.8 this is improved since being started on Amaryl 2mg daily. I am pleased with this number. We will see her back in 2-3 weeks to see if she was able to be seen by her GI.     Osvaldo Rajput, APRN-CNP

## 2024-07-16 ENCOUNTER — APPOINTMENT (OUTPATIENT)
Dept: SURGERY | Facility: CLINIC | Age: 74
End: 2024-07-16
Payer: MEDICARE

## 2024-07-16 VITALS
SYSTOLIC BLOOD PRESSURE: 133 MMHG | HEART RATE: 89 BPM | BODY MASS INDEX: 27.23 KG/M2 | DIASTOLIC BLOOD PRESSURE: 75 MMHG | HEIGHT: 62 IN | WEIGHT: 148 LBS

## 2024-07-16 DIAGNOSIS — E21.3 HYPERPARATHYROIDISM (MULTI): ICD-10-CM

## 2024-07-16 DIAGNOSIS — Z98.84 HISTORY OF GASTRIC BYPASS: ICD-10-CM

## 2024-07-16 DIAGNOSIS — E55.9 VITAMIN D DEFICIENCY: ICD-10-CM

## 2024-07-16 DIAGNOSIS — K21.9 GASTROESOPHAGEAL REFLUX DISEASE, UNSPECIFIED WHETHER ESOPHAGITIS PRESENT: ICD-10-CM

## 2024-07-16 DIAGNOSIS — E53.8 VITAMIN B 12 DEFICIENCY: ICD-10-CM

## 2024-07-16 DIAGNOSIS — K90.9 INTESTINAL MALABSORPTION, UNSPECIFIED TYPE (HHS-HCC): Primary | ICD-10-CM

## 2024-07-16 DIAGNOSIS — D50.8 IRON DEFICIENCY ANEMIA SECONDARY TO INADEQUATE DIETARY IRON INTAKE: ICD-10-CM

## 2024-07-16 PROCEDURE — 1125F AMNT PAIN NOTED PAIN PRSNT: CPT

## 2024-07-16 PROCEDURE — 96372 THER/PROPH/DIAG INJ SC/IM: CPT

## 2024-07-16 PROCEDURE — 3075F SYST BP GE 130 - 139MM HG: CPT

## 2024-07-16 PROCEDURE — 3044F HG A1C LEVEL LT 7.0%: CPT

## 2024-07-16 PROCEDURE — 3049F LDL-C 100-129 MG/DL: CPT

## 2024-07-16 PROCEDURE — 99214 OFFICE O/P EST MOD 30 MIN: CPT

## 2024-07-16 PROCEDURE — 1159F MED LIST DOCD IN RCRD: CPT

## 2024-07-16 PROCEDURE — 3078F DIAST BP <80 MM HG: CPT

## 2024-07-16 RX ORDER — FAMOTIDINE 10 MG/ML
20 INJECTION INTRAVENOUS ONCE AS NEEDED
OUTPATIENT
Start: 2024-07-16

## 2024-07-16 RX ORDER — CALC/MAG/B COMPLEX/D3/HERB 61
15 TABLET ORAL
COMMUNITY

## 2024-07-16 RX ORDER — DIPHENHYDRAMINE HYDROCHLORIDE 50 MG/ML
50 INJECTION INTRAMUSCULAR; INTRAVENOUS AS NEEDED
OUTPATIENT
Start: 2024-07-16

## 2024-07-16 RX ORDER — ALBUTEROL SULFATE 0.83 MG/ML
3 SOLUTION RESPIRATORY (INHALATION) AS NEEDED
OUTPATIENT
Start: 2024-07-16

## 2024-07-16 RX ORDER — OMEPRAZOLE 40 MG/1
40 CAPSULE, DELAYED RELEASE ORAL
Qty: 180 CAPSULE | Refills: 1 | Status: SHIPPED | OUTPATIENT
Start: 2024-07-16 | End: 2025-01-12

## 2024-07-16 RX ORDER — EPINEPHRINE 0.3 MG/.3ML
0.3 INJECTION SUBCUTANEOUS EVERY 5 MIN PRN
OUTPATIENT
Start: 2024-07-16

## 2024-07-16 RX ORDER — DEXTROMETHORPHAN HYDROBROMIDE, GUAIFENESIN 5; 100 MG/5ML; MG/5ML
650 LIQUID ORAL EVERY 8 HOURS PRN
COMMUNITY

## 2024-07-16 ASSESSMENT — PAIN SCALES - GENERAL: PAINLEVEL: 6

## 2024-07-16 ASSESSMENT — PATIENT HEALTH QUESTIONNAIRE - PHQ9
SUM OF ALL RESPONSES TO PHQ9 QUESTIONS 1 AND 2: 0
1. LITTLE INTEREST OR PLEASURE IN DOING THINGS: NOT AT ALL
2. FEELING DOWN, DEPRESSED OR HOPELESS: NOT AT ALL

## 2024-07-16 NOTE — PATIENT INSTRUCTIONS
You can purchase an over the counter iron supplement (typically 65mg) from the pharmacy. Please make sure you separate this from your calcium supplement by at least 2 hours because they interfere with each other's absorption. Please start as soon as possible.     Someone from the infusion will be contacting you to schedule your iron infusion. You can contact them at [phone]

## 2024-07-16 NOTE — PROGRESS NOTES
Subjective   Patient ID: Heather Thurston is a 74 y.o. female who presents for No chief complaint on file..  HPI  Heather is here for follow up after getting labwork. She is complaining of epigastric pain. She is trying to have eye surgery, but tells me that she is not cleared due to her anemia. Heather reports taking one tablet of an adult gummy MVI, 2 chewable tablets of vitamin C and 2 chewable tablets of vitamin D.     Objective   Physical Exam  Constitutional:       Appearance: Normal appearance.   Eyes:      Pupils: Pupils are equal, round, and reactive to light.   Cardiovascular:      Rate and Rhythm: Normal rate.   Pulmonary:      Effort: Pulmonary effort is normal.   Abdominal:      Palpations: Abdomen is soft.   Musculoskeletal:         General: Normal range of motion.   Skin:     General: Skin is warm and dry.   Neurological:      General: No focal deficit present.      Mental Status: She is alert and oriented to person, place, and time.   Psychiatric:         Mood and Affect: Mood normal.        Latest Reference Range & Units 07/08/24 14:05   GLUCOSE 65 - 99 mg/dL 234 (H)   SODIUM 133 - 145 mmol/L 137   POTASSIUM 3.4 - 5.1 mmol/L 4.4   CHLORIDE 97 - 107 mmol/L 101   Bicarbonate 24 - 31 mmol/L 27   Anion Gap <=19 mmol/L 9   Blood Urea Nitrogen 8 - 25 mg/dL 14   Creatinine 0.40 - 1.60 mg/dL 0.70   EGFR >60 mL/min/1.73m*2 >90   Calcium 8.5 - 10.4 mg/dL 9.8   Albumin 3.5 - 5.0 g/dL 4.3   Alkaline Phosphatase 35 - 125 U/L 42   ALT 5 - 40 U/L 7   AST 5 - 40 U/L 14   Bilirubin Total 0.1 - 1.2 mg/dL 0.3   HDL CHOLESTEROL >50.0 mg/dL 59.0   Cholesterol/HDL Ratio SEE COMMENT  3.1   LDL Calculated 65 - 130 mg/dL 101   TRIGLYCERIDES 40 - 150 mg/dL 118   FERRITIN 13 - 150 ng/mL 17   FOLATE 4.2 - 19.9 ng/mL 15.8   Total Protein 5.9 - 7.9 g/dL 6.5   IRON 30 - 160 ug/dL 28 (L)   CHOLESTEROL 133 - 200 mg/dL 184   TIBC 228 - 428 ug/dL 536 (H)   UIBC 110 - 370 ug/dL 508 (H)   % Saturation 12 - 50 % 5 (L)   Vitamin B12 211 -  946 pg/mL 1,404 (H)   Copper 80.0 - 155.0 ug/dL 87.5   Zinc, Serum or Plasma 60.0 - 120.0 ug/dL 67.4   Vitamin B1, Whole Blood 70 - 180 nmol/L 131   Hemoglobin A1C See below % 6.8 (H)   Parathyroid Hormone, Intact 18.5 - 88.0 pg/mL 229.8 (H)   Thyroid Stimulating Hormone 0.27 - 4.20 mIU/L 2.40   Vitamin D, 25-Hydroxy, Total 31 - 100 ng/mL 25 (L)   Estimated Average Glucose Not Established mg/dL 148   LEUKOCYTES (10*3/UL) IN BLOOD BY AUTOMATED COUNT, Bahamian 4.4 - 11.3 x10*3/uL 6.9   nRBC 0.0 - 0.0 /100 WBCs 0.0   ERYTHROCYTES (10*6/UL) IN BLOOD BY AUTOMATED COUNT, [place]ian 4.00 - 5.20 x10*6/uL 3.98 (L)   HEMOGLOBIN 12.0 - 16.0 g/dL 8.9 (L)   HEMATOCRIT 36.0 - 46.0 % 31.5 (L)   MCV 80 - 100 fL 79 (L)   MCH 26.0 - 34.0 pg 22.4 (L)   MCHC 32.0 - 36.0 g/dL 28.3 (L)   RED CELL DISTRIBUTION WIDTH 11.5 - 14.5 % 18.4 (H)   PLATELETS (10*3/UL) IN BLOOD AUTOMATED COUNT, Bahamian 150 - 450 x10*3/uL 246   NEUTROPHILS/100 LEUKOCYTES IN BLOOD BY AUTOMATED COUNT, [place]ian 40.0 - 80.0 % 55.7   Immature Granulocytes %, Automated 0.0 - 0.9 % 0.1   Lymphocytes % 13.0 - 44.0 % 29.1   Monocytes % 2.0 - 10.0 % 9.3   Eosinophils % 0.0 - 6.0 % 4.1   Basophils % 0.0 - 2.0 % 1.7   NEUTROPHILS (10*3/UL) IN BLOOD BY AUTOMATED COUNT, [place]ian 1.60 - 5.50 x10*3/uL 3.85   Immature Granulocytes Absolute, Automated 0.00 - 0.50 x10*3/uL 0.01   Lymphocytes Absolute 0.80 - 3.00 x10*3/uL 2.01   Monocytes Absolute 0.05 - 0.80 x10*3/uL 0.64   Eosinophils Absolute 0.00 - 0.40 x10*3/uL 0.28   Basophils Absolute 0.00 - 0.10 x10*3/uL 0.12 (H)     Assessment/Plan   Problem List Items Addressed This Visit             ICD-10-CM    Gastroesophageal reflux disease K21.9    Relevant Medications    omeprazole (PriLOSEC) 40 mg DR capsule    Hyperparathyroidism (Multi) E21.3    Relevant Orders    Parathyroid Hormone, Intact    Vitamin B 12 deficiency E53.8    Vitamin D deficiency E55.9    Relevant Orders    Vitamin D 25-Hydroxy,Total (for eval of Vitamin D levels)     History of gastric bypass Z98.84    Intestinal malabsorption (HHS-HCC) - Primary K90.9    Relevant Medications    cyanocobalamin (Vitamin B-12) injection 1,000 mcg (Completed)    Other Relevant Orders    CBC and Auto Differential    Ferritin    Iron and TIBC    Parathyroid Hormone, Intact    Vitamin D 25-Hydroxy,Total (for eval of Vitamin D levels)    Anemia D64.9    Relevant Orders    CBC and Auto Differential    Ferritin    Iron and TIBC     It is unclear if Heather is taking the correct supplementation. What she reports taking and what is listed on her medication list are not comparable. We reviewed the proper supplementation after bariatric surgery. She was shown a vitamin hand out. We discussed that her anemia is most likely related to inadequate dietary intake due to the fact that gummy vitamins do not have iron. I recommended that she start an iron supplement with her MVI. We discussed that she should not take iron at the same time that she takes calcium. She will also start iron infusions. We will recheck her labwork in 1 month. She has a follow up with Dr Walker to review testing around that time.       ROD Middleton-CNP 07/16/24 1:39 PM

## 2024-07-16 NOTE — PROGRESS NOTES
Subjective   Patient ID: Heather Thurston is a 74 y.o. female who presents for No chief complaint on file..  HPI  Vitamin b 12 administered by sd to rt deltoid  Saw Dr. Pryor a month ago was having urinary issues  Had an infusion for RA recently  Review of Systems  CONSTITUTIONAL:          Chills No.  Fatigue No.  Fever No.         CARDIOLOGY:          Negative for dizziness, chest pain, palpitations, shortness of breath.         RESPIRATORY:          Sleep Apnea No.  Negative for chest congestion, cough, wheezing.         GASTROENTEROLOGY:          Food Intolerance No.  Abdominal pain No.  Acid reflux No.  Black stools No.  Constipation No.  Diarrhea yes Loss of appetite no.  Nausea No.  Vomiting No.         UROLOGY:          Negative for dysuria, urinary urgency, kidney stones.         PSYCHOLOGY:          Negative for depression, anxiety, high stress level.    Objective   Physical Exam    Assessment/Plan            Blanche Ware LPN 07/16/24 1:26 PM

## 2024-07-17 ENCOUNTER — DOCUMENTATION (OUTPATIENT)
Dept: INFUSION THERAPY | Facility: CLINIC | Age: 74
End: 2024-07-17
Payer: MEDICARE

## 2024-07-17 NOTE — PROGRESS NOTES
CLINICAL CLEARANCE FOR OUTPATIENT INFUSION      Patient to be scheduled for Feraheme (Ferumoxytol) infusions.     For Diagnosis: Iron Deficiency Anemia    Urine Hcg test ordered prior to first infusion?  Not applicable (If female pt <60 years of age and with reproductive capability assure order in place)    Review of Labs:  Lab Results   Component Value Date    HGB 8.9 (L) 07/08/2024    FERRITIN 17 07/08/2024    IRON 28 (L) 07/08/2024    TIBC 536 (H) 07/08/2024    MCHC 28.3 (L) 07/08/2024    MCV 79 (L) 07/08/2024    MCH 22.4 (L) 07/08/2024      Lab Results   Component Value Date    IRONSAT 5 (L) 07/08/2024        Do labs confirm diagnosis of iron deficiency? Yes    (If NO please reach out to prescribing provider prior to proceeding)      Okay to schedule for infusion as ordered by prescribing provider.     Start Ambien 5 mg/nightly.

## 2024-07-19 DIAGNOSIS — M81.0 AGE RELATED OSTEOPOROSIS, UNSPECIFIED PATHOLOGICAL FRACTURE PRESENCE: Primary | ICD-10-CM

## 2024-07-23 ENCOUNTER — APPOINTMENT (OUTPATIENT)
Dept: INFUSION THERAPY | Facility: CLINIC | Age: 74
End: 2024-07-23
Payer: MEDICARE

## 2024-07-23 VITALS
RESPIRATION RATE: 18 BRPM | SYSTOLIC BLOOD PRESSURE: 130 MMHG | WEIGHT: 157.7 LBS | OXYGEN SATURATION: 98 % | BODY MASS INDEX: 28.84 KG/M2 | TEMPERATURE: 98.7 F | DIASTOLIC BLOOD PRESSURE: 54 MMHG | HEART RATE: 85 BPM

## 2024-07-23 DIAGNOSIS — D50.8 IRON DEFICIENCY ANEMIA SECONDARY TO INADEQUATE DIETARY IRON INTAKE: Primary | ICD-10-CM

## 2024-07-23 PROCEDURE — 96365 THER/PROPH/DIAG IV INF INIT: CPT | Performed by: NURSE PRACTITIONER

## 2024-07-23 RX ORDER — EPINEPHRINE 0.3 MG/.3ML
0.3 INJECTION SUBCUTANEOUS EVERY 5 MIN PRN
OUTPATIENT
Start: 2024-07-30

## 2024-07-23 RX ORDER — ALBUTEROL SULFATE 0.83 MG/ML
3 SOLUTION RESPIRATORY (INHALATION) AS NEEDED
OUTPATIENT
Start: 2024-07-30

## 2024-07-23 RX ORDER — FAMOTIDINE 10 MG/ML
20 INJECTION INTRAVENOUS ONCE AS NEEDED
OUTPATIENT
Start: 2024-07-30

## 2024-07-23 RX ORDER — DIPHENHYDRAMINE HYDROCHLORIDE 50 MG/ML
50 INJECTION INTRAMUSCULAR; INTRAVENOUS AS NEEDED
OUTPATIENT
Start: 2024-07-30

## 2024-07-23 ASSESSMENT — PAIN SCALES - GENERAL: PAINLEVEL: 0-NO PAIN

## 2024-07-23 NOTE — PROGRESS NOTES
[facility]   Infusion Clinic Note   Date: 2024   Name: Heather Thurston  : 1950   MRN: [id number]         Reason for Visit: Infusion       Today: Heather had 510 mg of Feraheme administered today. Dose 1/2      Visit Type: INFUSION       Ordered By: KARL Zelaya CNP      Accompanied by:Self      Diagnosis: Iron deficiency anemia secondary to inadequate dietary iron intake       Allergies:   Allergies as of 2024 - Reviewed 07/15/2024   Allergen Reaction Noted    Atropine Anaphylaxis, Nausea And Vomiting, Palpitations, and Rash 2023    Codeine Itching and Unknown 2023    Ezetimibe Unknown 2023    Iodinated contrast media Other and Syncope 2023    Iodine Nausea/vomiting 10/16/2019    Lisinopril Cough 2023    Metformin hcl Diarrhea 2023    Simvastatin Unknown 2023    Adhesive tape-silicones Rash 2023         Current Medications has a current medication list which includes the following prescription(s): acetaminophen, amitriptyline, ascorbic acid, calcium carbonate, calcium citrate, cimzia, cholecalciferol, cyanocobalamin, prolia, gabapentin, glimepiride, lansoprazole, leflunomide, multivit-minerals/folic acid, omeprazole, oxybutynin, sertraline, sucralfate, sulfasalazine, and tramadol, and the following Facility-Administered Medications: ferumoxytol (Feraheme) 510 mg in sodium chloride 0.9% 117 mL IV.       Vitals:   There were no vitals filed for this visit.          Infusion Pre-procedure Checklist:   - Allergies reviewed: yes   - Medications reviewed: yes       - Previous reaction to current treatment: n/a      Assess patient for the concerns below. Document provider notification as appropriate.  - Active or recent infection with/without current antibiotic use: no  - Recent or planned invasive dental work: no  - Recent or planned surgeries: yes - to left eyelid  - Recently received or plans to receive vaccinations: no  - Has  treatment related toxicities: n/a  - Is pregnant:  no      Pain: 0   - Is the pain different from normal: n/a   - Is the pain tolerable: n/a   - Is your Doctor aware:  n/a      Labs: N/A         Fall Risk Screening:         Review Of Systems:  Review of Systems - Oncology      Infusion Readiness:   - Assessment Concerns Related to Infusion: No  - Provider notified: n/a      Document Below Only If Indicated:   New Patient Education:    NEW PATIENT MEDICATION EDUCATION PT PROVIDED WITH WRITTEN ([email or web address] PT EDUCATION SHEET) AND VERBAL EDUCATION REGARDING MEDICATION GIVEN. VERIFIED MEDICATION NAME WITH PATIENT AND DISCUSSED REASON FOR USE. BRIEFLY DISCUSSED HOW MEDICATION WORKS AND EDUCATED ON GOAL OF TREATMENT, FREQUENCY OF TREATMENT, ADVERSE RXN'S AND COMMON SIDE EFFECTS TO MONITOR FOR. INSTRUCTED PT TO ASSURE THAT ALL PROVIDERS INCLUDING DENTISTS ARE AWARE OF MEDICATION RECEIVED. DISCUSSED FLOW OF VISIT AND ORIENTED TO INFUSION CENTER. PT VERBALIZES UNDERSTANDING. CALL LIGHT PROVIDED AND PT AWARE TO ALERT STAFF OF ANY CONCERNS DURING TREATMENT.        Treatment Conditions & Drug Specific Questions:    Ferumoxytol  (FERAHEME)    (Unless otherwise specified on patient specific therapy plan):    REMINDERS:  May cause hypotension, patient should be in a reclined or semi-reclined position during infusion.    Ferumoxytol can interfere with MR imaging and Radiology should be notified if a patient has received ferumoxytol within 3 months of the anticipated MR.    Recommended Vitals/Observation:  Vitals: Obtain vital signs before, immediately following infusion, and 30 minutes after completion of infusion.  Observation: 30 minutes after each infusion.     Lab Results   Component Value Date    IRON 28 (L) 07/08/2024    TIBC 536 (H) 07/08/2024    FERRITIN 17 07/08/2024      Lab Results   Component Value Date    IRONSAT 5 (L) 07/08/2024      Lab Results   Component Value Date    WBC 6.9 07/08/2024    HGB 8.9 (L) 07/08/2024     HCT 31.5 (L) 07/08/2024    MCV 79 (L) 07/08/2024     07/08/2024            Weight Based Drug Calculations:    WEIGHT BASED DRUGS: NOT APPLICABLE / FLAT DOSE          Initiated By: ROD Wall-CNP   1255 Flush of 30 ml of NS is complete  1320 ok to discharge, no S&S of adverse effects. Patient tolerated first infusion with no concerns and is scheduled ahead for dose 2. 30 min observation period was completed.

## 2024-07-25 ENCOUNTER — HOSPITAL ENCOUNTER (OUTPATIENT)
Dept: RADIOLOGY | Facility: CLINIC | Age: 74
Discharge: HOME | End: 2024-07-25
Payer: MEDICARE

## 2024-07-25 DIAGNOSIS — M81.0 AGE RELATED OSTEOPOROSIS, UNSPECIFIED PATHOLOGICAL FRACTURE PRESENCE: ICD-10-CM

## 2024-07-25 PROCEDURE — 77080 DXA BONE DENSITY AXIAL: CPT | Performed by: RADIOLOGY

## 2024-07-25 PROCEDURE — 77080 DXA BONE DENSITY AXIAL: CPT

## 2024-07-25 ASSESSMENT — LIFESTYLE VARIABLES
3_OR_MORE_DRINKS_PER_DAY: N
CURRENT_SMOKER: N

## 2024-07-30 ENCOUNTER — APPOINTMENT (OUTPATIENT)
Dept: RHEUMATOLOGY | Facility: CLINIC | Age: 74
End: 2024-07-30
Payer: MEDICARE

## 2024-07-30 ENCOUNTER — LAB (OUTPATIENT)
Dept: LAB | Facility: LAB | Age: 74
End: 2024-07-30
Payer: MEDICARE

## 2024-07-30 VITALS
DIASTOLIC BLOOD PRESSURE: 91 MMHG | BODY MASS INDEX: 29.19 KG/M2 | HEART RATE: 88 BPM | SYSTOLIC BLOOD PRESSURE: 133 MMHG | HEIGHT: 62 IN | WEIGHT: 158.6 LBS | OXYGEN SATURATION: 95 %

## 2024-07-30 DIAGNOSIS — M06.9 RHEUMATOID ARTHRITIS, INVOLVING UNSPECIFIED SITE, UNSPECIFIED WHETHER RHEUMATOID FACTOR PRESENT (MULTI): Primary | ICD-10-CM

## 2024-07-30 DIAGNOSIS — Z79.899 ENCOUNTER FOR LONG-TERM (CURRENT) USE OF MEDICATIONS: ICD-10-CM

## 2024-07-30 DIAGNOSIS — M81.0 AGE RELATED OSTEOPOROSIS, UNSPECIFIED PATHOLOGICAL FRACTURE PRESENCE: ICD-10-CM

## 2024-07-30 DIAGNOSIS — M06.9 RHEUMATOID ARTHRITIS, INVOLVING UNSPECIFIED SITE, UNSPECIFIED WHETHER RHEUMATOID FACTOR PRESENT (MULTI): ICD-10-CM

## 2024-07-30 DIAGNOSIS — M19.90 CHRONIC ARTHRITIS: ICD-10-CM

## 2024-07-30 DIAGNOSIS — M06.09 RHEUMATOID ARTHRITIS OF MULTIPLE SITES WITHOUT RHEUMATOID FACTOR (MULTI): ICD-10-CM

## 2024-07-30 LAB
ALBUMIN SERPL BCP-MCNC: 4.4 G/DL (ref 3.4–5)
ALP SERPL-CCNC: 38 U/L (ref 33–136)
ALT SERPL W P-5'-P-CCNC: 12 U/L (ref 7–45)
ANION GAP SERPL CALC-SCNC: 10 MMOL/L (ref 10–20)
AST SERPL W P-5'-P-CCNC: 19 U/L (ref 9–39)
BILIRUB SERPL-MCNC: 0.2 MG/DL (ref 0–1.2)
BUN SERPL-MCNC: 12 MG/DL (ref 6–23)
CALCIUM SERPL-MCNC: 9.8 MG/DL (ref 8.6–10.3)
CHLORIDE SERPL-SCNC: 104 MMOL/L (ref 98–107)
CO2 SERPL-SCNC: 31 MMOL/L (ref 21–32)
CREAT SERPL-MCNC: 0.72 MG/DL (ref 0.5–1.05)
CRP SERPL-MCNC: <0.1 MG/DL
EGFRCR SERPLBLD CKD-EPI 2021: 88 ML/MIN/1.73M*2
ERYTHROCYTE [SEDIMENTATION RATE] IN BLOOD BY WESTERGREN METHOD: 5 MM/H (ref 0–30)
GLUCOSE SERPL-MCNC: 91 MG/DL (ref 74–99)
POTASSIUM SERPL-SCNC: 4.2 MMOL/L (ref 3.5–5.3)
PROT SERPL-MCNC: 6.7 G/DL (ref 6.4–8.2)
SODIUM SERPL-SCNC: 141 MMOL/L (ref 136–145)

## 2024-07-30 PROCEDURE — 86481 TB AG RESPONSE T-CELL SUSP: CPT

## 2024-07-30 PROCEDURE — 86431 RHEUMATOID FACTOR QUANT: CPT

## 2024-07-30 PROCEDURE — 86200 CCP ANTIBODY: CPT

## 2024-07-30 PROCEDURE — 99204 OFFICE O/P NEW MOD 45 MIN: CPT | Performed by: INTERNAL MEDICINE

## 2024-07-30 PROCEDURE — 85652 RBC SED RATE AUTOMATED: CPT

## 2024-07-30 PROCEDURE — 3008F BODY MASS INDEX DOCD: CPT | Performed by: INTERNAL MEDICINE

## 2024-07-30 PROCEDURE — 36415 COLL VENOUS BLD VENIPUNCTURE: CPT

## 2024-07-30 PROCEDURE — 3080F DIAST BP >= 90 MM HG: CPT | Performed by: INTERNAL MEDICINE

## 2024-07-30 PROCEDURE — 80053 COMPREHEN METABOLIC PANEL: CPT

## 2024-07-30 PROCEDURE — 1159F MED LIST DOCD IN RCRD: CPT | Performed by: INTERNAL MEDICINE

## 2024-07-30 PROCEDURE — 3044F HG A1C LEVEL LT 7.0%: CPT | Performed by: INTERNAL MEDICINE

## 2024-07-30 PROCEDURE — 3075F SYST BP GE 130 - 139MM HG: CPT | Performed by: INTERNAL MEDICINE

## 2024-07-30 PROCEDURE — 1125F AMNT PAIN NOTED PAIN PRSNT: CPT | Performed by: INTERNAL MEDICINE

## 2024-07-30 PROCEDURE — 86140 C-REACTIVE PROTEIN: CPT

## 2024-07-30 PROCEDURE — 3049F LDL-C 100-129 MG/DL: CPT | Performed by: INTERNAL MEDICINE

## 2024-07-30 RX ORDER — LEFLUNOMIDE 20 MG/1
1 TABLET ORAL
COMMUNITY
Start: 2024-04-22 | End: 2024-07-30 | Stop reason: ALTCHOICE

## 2024-07-30 RX ORDER — LEFLUNOMIDE 20 MG/1
20 TABLET ORAL DAILY
Qty: 30 TABLET | Refills: 5 | Status: SHIPPED | OUTPATIENT
Start: 2024-07-30 | End: 2024-07-31

## 2024-07-30 RX ORDER — GABAPENTIN 300 MG/1
300 CAPSULE ORAL 3 TIMES DAILY
Qty: 270 CAPSULE | Refills: 1 | Status: SHIPPED | OUTPATIENT
Start: 2024-07-30 | End: 2025-01-26

## 2024-07-30 RX ORDER — DAPAGLIFLOZIN 10 MG/1
10 TABLET, FILM COATED ORAL DAILY
COMMUNITY

## 2024-07-30 RX ORDER — DICYCLOMINE HYDROCHLORIDE 10 MG/1
1 CAPSULE ORAL
COMMUNITY
Start: 2023-11-08

## 2024-07-30 RX ORDER — ERYTHROMYCIN 5 MG/G
OINTMENT OPHTHALMIC
COMMUNITY
Start: 2024-07-16

## 2024-07-30 ASSESSMENT — PAIN SCALES - GENERAL: PAINLEVEL: 6

## 2024-07-30 NOTE — PROGRESS NOTES
Subjective   Patient ID: Heather Thurston is a 74 y.o. female who presents for Transfer from Dr. Jameson 0 OP, RA, OA, FMS.  HPI  Previous patient of Dr. Cali Jameson.    She has been following him with for a number of years and has a diagnosis of rheumatoid arthritis, osteoarthritis, gabapentin, neuropathy.  Also has a history of gastric bypass, carpal tunnel release, hysterectomy, cholecystectomy, bilateral knee replacement  DEXA 7/25/2024 lumbar spine -1.6, left total hip -1.5, left femoral neck -2.1  DEXA 3/23/2021 lumbar spine -2.4 left total hip -1.4, left femoral neck -2.0  She has some slight confusion about her medications and says that she is anxious about this visit.    She currently has been on Cimzia injections for several months.  She had been on sulfasalazine but this was discontinued earlier this year.  She has been found to be anemic due to iron deficiency and thought to be due to her gastric bypass.  She says she was not taking the necessary supplements.    She has been on other anti-inflammatories like aspirin, Celebrex, Vioxx, naproxen    Because of her anemia she has been very fatigued and has not done too much.  She has gained weight.  Her last iron infusion was last week.    She does see pain management, Dr. Pichardo and has had injections in the middle of her back.  Most of her symptoms are in her neck and thoracic spine.  Her arms get tired.  She has had injections in her hips before    Does not get frequent infections.  She does take gabapentin  And tries not to take it during the day and takes 2 at night.  She might take another 1 during the day.    Also has been on tramadol but has not been on this in some time.    Usually just took 1 a day.    Social history is negative for tobacco, alcohol, illicit drug use.  She is worked as a hairdresser, , .      Review of Systems   Constitutional:         Weight gain   HENT:          Dry mouth   Eyes:         Dry eyes    Respiratory:  Positive for shortness of breath.    Gastrointestinal:  Positive for nausea.        Heartburn   Musculoskeletal:         Morning stiffness, joint pain, muscle weakness and tenderness, joint swelling   Skin:         hAir thinning       Objective   Physical Exam  GEN: NAD A&O x3 appropriate affect  HENT: no enlarged glands or thyroid  EYES:  no conjunctival redness, eyelids normal  LYMPH: no cervica lymphadenopathy  NEURO: 5/5 strength upper and lower extremities, DTR +2 bicep and patellar tendons  SKIN: no rashes, ulcers, or subcutaneous nodules  PULSES: +radials  TENDER POINTS: 0/18   MSK:  Bilateral knee replacements  Fullness in the MCP 2 3  Assessment/Plan     Seronegative rheumatoid arthritis positive CCP.  Previously had been on sulfasalazine   -Will have her continue with leflunomide and Cimzia.  She has had some issues with iron deficiency anemia and has been getting iron infusions.  Will monitor and see if we need to make any adjustments to her medication namely than leflunomide which can affect her anemia more so.    Postmenopausal osteoporosis with history of gastric bypass.  Has had stress fractures in her feet previously.   DEXA 7/25/2024 lumbar spine -1.6, left total hip -1.5, left femoral neck -2.1  DEXA 3/23/2021 lumbar spine -2.4 left total hip -1.4, left femoral neck -2.0   -Will have her continue with Prolia which is due next month.    Osteoarthritis and chronic pain   -Patient says she was using tramadol once a day previously with Dr. Humphreys who has retired and has been without it has been having more pain.  Patient righted us with a urine drug screen and controlled substance contract.    Ludivina Chowdhury MD 07/30/24 3:19 PM

## 2024-07-31 LAB
CCP IGG SERPL-ACNC: <1 U/ML
RHEUMATOID FACT SER NEPH-ACNC: <10 IU/ML (ref 0–15)

## 2024-07-31 RX ORDER — CERTOLIZUMAB PEGOL 200 MG/ML
400 INJECTION, SOLUTION SUBCUTANEOUS
Qty: 2 ML | Refills: 11 | Status: SHIPPED
Start: 2024-07-31 | End: 2025-07-31

## 2024-07-31 RX ORDER — TRAMADOL HYDROCHLORIDE 50 MG/1
50 TABLET ORAL DAILY PRN
Qty: 90 TABLET | Refills: 0 | Status: SHIPPED | OUTPATIENT
Start: 2024-07-31 | End: 2024-10-29

## 2024-07-31 RX ORDER — DENOSUMAB 60 MG/ML
60 INJECTION SUBCUTANEOUS
Qty: 1 ML | Refills: 1 | Status: SHIPPED
Start: 2024-07-31

## 2024-07-31 RX ORDER — ALBUTEROL SULFATE 0.83 MG/ML
3 SOLUTION RESPIRATORY (INHALATION) AS NEEDED
Status: CANCELLED | OUTPATIENT
Start: 2024-07-31

## 2024-07-31 RX ORDER — LEFLUNOMIDE 20 MG/1
20 TABLET ORAL DAILY
Qty: 90 TABLET | Refills: 1 | Status: SHIPPED | OUTPATIENT
Start: 2024-07-31

## 2024-07-31 RX ORDER — HEPARIN 100 UNIT/ML
500 SYRINGE INTRAVENOUS AS NEEDED
Status: CANCELLED | OUTPATIENT
Start: 2024-07-31

## 2024-07-31 RX ORDER — DIPHENHYDRAMINE HYDROCHLORIDE 50 MG/ML
50 INJECTION INTRAMUSCULAR; INTRAVENOUS AS NEEDED
Status: CANCELLED | OUTPATIENT
Start: 2024-07-31

## 2024-07-31 RX ORDER — FAMOTIDINE 10 MG/ML
20 INJECTION INTRAVENOUS ONCE AS NEEDED
Status: CANCELLED | OUTPATIENT
Start: 2024-07-31

## 2024-07-31 RX ORDER — HEPARIN SODIUM,PORCINE/PF 10 UNIT/ML
50 SYRINGE (ML) INTRAVENOUS AS NEEDED
Status: CANCELLED | OUTPATIENT
Start: 2024-07-31

## 2024-07-31 RX ORDER — EPINEPHRINE 0.3 MG/.3ML
0.3 INJECTION SUBCUTANEOUS EVERY 5 MIN PRN
Status: CANCELLED | OUTPATIENT
Start: 2024-07-31

## 2024-07-31 ASSESSMENT — ENCOUNTER SYMPTOMS
NAUSEA: 1
SHORTNESS OF BREATH: 1
ROS GI COMMENTS: HEARTBURN
ROS SKIN COMMENTS: HAIR THINNING

## 2024-08-01 ENCOUNTER — APPOINTMENT (OUTPATIENT)
Dept: INFUSION THERAPY | Facility: CLINIC | Age: 74
End: 2024-08-01
Payer: MEDICARE

## 2024-08-01 VITALS
RESPIRATION RATE: 16 BRPM | TEMPERATURE: 97.5 F | SYSTOLIC BLOOD PRESSURE: 140 MMHG | BODY MASS INDEX: 29.12 KG/M2 | WEIGHT: 159.2 LBS | OXYGEN SATURATION: 98 % | DIASTOLIC BLOOD PRESSURE: 69 MMHG | HEART RATE: 87 BPM

## 2024-08-01 DIAGNOSIS — M06.09 RHEUMATOID ARTHRITIS OF MULTIPLE SITES WITHOUT RHEUMATOID FACTOR (MULTI): ICD-10-CM

## 2024-08-01 LAB
NIL(NEG) CONTROL SPOT COUNT: NORMAL
PANEL A SPOT COUNT: 0
PANEL B SPOT COUNT: 0
POS CONTROL SPOT COUNT: NORMAL
T-SPOT. TB INTERPRETATION: NEGATIVE

## 2024-08-01 PROCEDURE — 96372 THER/PROPH/DIAG INJ SC/IM: CPT | Performed by: NURSE PRACTITIONER

## 2024-08-01 RX ORDER — ALBUTEROL SULFATE 0.83 MG/ML
3 SOLUTION RESPIRATORY (INHALATION) AS NEEDED
OUTPATIENT
Start: 2024-08-08

## 2024-08-01 RX ORDER — DIPHENHYDRAMINE HYDROCHLORIDE 50 MG/ML
50 INJECTION INTRAMUSCULAR; INTRAVENOUS AS NEEDED
OUTPATIENT
Start: 2024-08-08

## 2024-08-01 RX ORDER — EPINEPHRINE 0.3 MG/.3ML
0.3 INJECTION SUBCUTANEOUS EVERY 5 MIN PRN
OUTPATIENT
Start: 2024-08-08

## 2024-08-01 RX ORDER — FAMOTIDINE 10 MG/ML
20 INJECTION INTRAVENOUS ONCE AS NEEDED
OUTPATIENT
Start: 2024-08-08

## 2024-08-01 ASSESSMENT — ENCOUNTER SYMPTOMS
FATIGUE: 1
ARTHRALGIAS: 1
MYALGIAS: 1

## 2024-08-01 ASSESSMENT — PAIN SCALES - GENERAL: PAINLEVEL: 6

## 2024-08-01 NOTE — PROGRESS NOTES
[facility]   Infusion Clinic Note   Date: 2024   Name: Heather Thurston  : 1950   MRN: [id number]         Reason for Visit: OP Infusion (Cimzia)         Today: We administered certolizumab pegol.       Visit Type: INJECTION       Ordered By: Dr Lyle      Accompanied by:Self      Diagnosis: Rheumatoid arthritis of multiple sites without rheumatoid factor (Multi)       Allergies:   Allergies as of 2024 - Reviewed 2024   Allergen Reaction Noted   • Atropine Anaphylaxis, Nausea And Vomiting, Palpitations, and Rash 2023   • Codeine Itching and Unknown 2023   • Ezetimibe Unknown 2023   • Iodinated contrast media Other and Syncope 2023   • Iodine Nausea/vomiting 10/16/2019   • Lisinopril Cough 2023   • Metformin hcl Diarrhea 2023   • Simvastatin Unknown 2023   • Adhesive tape-silicones Rash 2023         Current Medications has a current medication list which includes the following prescription(s): acetaminophen, amitriptyline, ascorbic acid, calcium carbonate, calcium citrate, cimzia, cholecalciferol, cyanocobalamin, dapagliflozin propanediol, prolia, dicyclomine, erythromycin, gabapentin, glimepiride, lansoprazole, leflunomide, multivit-minerals/folic acid, omeprazole, oxybutynin, sertraline, sucralfate, sulfasalazine, and tramadol.       Vitals:   Vitals:    24 1109   BP: 140/69   Pulse: 87   Resp: 16   Temp: 36.4 °C (97.5 °F)   SpO2: 98%   Weight: 72.2 kg (159 lb 3.2 oz)   PainSc:   6   PainLoc: Comment: spine and neck             Infusion Pre-procedure Checklist:   - Allergies reviewed: yes   - Medications reviewed: yes       - Previous reaction to current treatment: no      Assess patient for the concerns below. Document provider notification as appropriate.  - Active or recent infection with/without current antibiotic use: no  - Recent or planned invasive dental work: no  - Recent or planned surgeries: no  -  "Recently received or plans to receive vaccinations: no  - Has treatment related toxicities: no  - Is pregnant:  n/a      Pain: 5   - Is the pain different from normal: no   - Is the pain tolerable: no   - Is your Doctor aware:  no      Labs: N/A         Fall Risk Screening: More Fall Risk  History of Falling, Immediate or Within 3 Months: No  Secondary Diagnosis: Yes  Ambulatory Aid: Walks without aid/bedrest/nurse assist  Intravenous Therapy/Heparin Lock: No  Gait/Transferring: Normal/bedrest/immobile  Mental Status: Oriented to own ability  More Fall Risk Score: 15       Review Of Systems:  Review of Systems   Constitutional:  Positive for fatigue.   Gastrointestinal:         C/O stomach burning, that has decreased her appetite. Has been having testing done for this.    Musculoskeletal:  Positive for arthralgias and myalgias.   Patient c/o feeling dizzy and blurry vision on arrival, states she hasn't eaten and feels as if her blood sugar is low. Patient provided with 15 grams carbohydrates, after patient ate, states her symptoms were relieved.       Infusion Readiness:   - Assessment Concerns Related to Infusion: No  - Provider notified: n/a      Document Below Only If Indicated:   New Patient Education:    N/A (returning patient for continuation of therapy. Ongoing education provided as needed.)        Treatment Conditions & Drug Specific Questions:    Certolizumab Pegol  (CIMZIA)    (Unless otherwise specified on patient specific therapy plan):     TREATMENT CONDITIONS:  Unless otherwise specified on patient specific therapy plan HOLD treatment and notify provider prior to proceeding with today's infusion if patient with a:  o Positive Hepatitis B Surface Ag  o Positive T-Spot  o NO baseline CBC-D obtained  o NO baseline CMP obtained    No results found for: \"HAGCN\", \"HEPBSURABI\", \"HBSAG\", \"XHAGF\", \"HEPBSAG\", \"EXTHEPBSAG\", \"NONUHSWGH\"   No results found for: \"NONUHFIRE\", \"NONUHSWGH\", \"NONUHFISH\", " "\"EXTHEPBSAG\"  No results found for: \"TBSIN\", \"TBGRES\", \"QFG\", \"TSPOTR\"   Lab Results   Component Value Date    WBC 6.9 07/08/2024    HGB 8.9 (L) 07/08/2024    HCT 31.5 (L) 07/08/2024    MCV 79 (L) 07/08/2024     07/08/2024        Chemistry    Lab Results   Component Value Date/Time     07/30/2024 1612    K 4.2 07/30/2024 1612     07/30/2024 1612    CO2 31 07/30/2024 1612    BUN 12 07/30/2024 1612    CREATININE 0.72 07/30/2024 1612    Lab Results   Component Value Date/Time    CALCIUM 9.8 07/30/2024 1612    ALKPHOS 38 07/30/2024 1612    AST 19 07/30/2024 1612    ALT 12 07/30/2024 1612    BILITOT 0.2 07/30/2024 1612             Labs reviewed and patient meets treatment conditions? Yes    DRUG SPECIFIC QUESTIONS:  - Latex Allergy? No     (Caution: Prefilled syringe may include Latex)    - Any new or worsening signs / symptoms of heart failure which may include things like worsening shortness of breath, swelling, fatigue? No   (If yes notify provider before proceeding with today's infusion. New onset or worsening HF have been reported with infliximab)      REMINDERS:  Recommended Vitals/Observation:  Vitals: Obtain vitals prior to injection.  Observation: Patient may leave immediately following injection.        Weight Based Drug Calculations:    WEIGHT BASED DRUGS: NOT APPLICABLE / FLAT DOSE          Initiated By: Pearl Alvarado RN        "

## 2024-08-02 ENCOUNTER — APPOINTMENT (OUTPATIENT)
Dept: INFUSION THERAPY | Facility: CLINIC | Age: 74
End: 2024-08-02
Payer: MEDICARE

## 2024-08-02 VITALS
HEART RATE: 69 BPM | RESPIRATION RATE: 16 BRPM | TEMPERATURE: 97.8 F | OXYGEN SATURATION: 96 % | SYSTOLIC BLOOD PRESSURE: 136 MMHG | DIASTOLIC BLOOD PRESSURE: 78 MMHG

## 2024-08-02 DIAGNOSIS — D50.8 IRON DEFICIENCY ANEMIA SECONDARY TO INADEQUATE DIETARY IRON INTAKE: ICD-10-CM

## 2024-08-02 PROCEDURE — 96365 THER/PROPH/DIAG IV INF INIT: CPT | Performed by: NURSE PRACTITIONER

## 2024-08-02 RX ORDER — ALBUTEROL SULFATE 0.83 MG/ML
3 SOLUTION RESPIRATORY (INHALATION) AS NEEDED
Status: CANCELLED | OUTPATIENT
Start: 2024-08-06

## 2024-08-02 RX ORDER — DIPHENHYDRAMINE HYDROCHLORIDE 50 MG/ML
50 INJECTION INTRAMUSCULAR; INTRAVENOUS AS NEEDED
Status: CANCELLED | OUTPATIENT
Start: 2024-08-06

## 2024-08-02 RX ORDER — FAMOTIDINE 10 MG/ML
20 INJECTION INTRAVENOUS ONCE AS NEEDED
Status: CANCELLED | OUTPATIENT
Start: 2024-08-06

## 2024-08-02 RX ORDER — EPINEPHRINE 0.3 MG/.3ML
0.3 INJECTION SUBCUTANEOUS EVERY 5 MIN PRN
Status: CANCELLED | OUTPATIENT
Start: 2024-08-06

## 2024-08-02 ASSESSMENT — ENCOUNTER SYMPTOMS
FATIGUE: 1
MYALGIAS: 1
ARTHRALGIAS: 1

## 2024-08-02 ASSESSMENT — PAIN SCALES - GENERAL: PAINLEVEL: 5

## 2024-08-02 NOTE — PROGRESS NOTES
[facility]   Infusion Clinic Note   Date: 2024   Name: Heather Thurston  : 1950   MRN: [id number]         Reason for Visit: OP Infusion (Feraheme)         Today: We administered ferumoxytol (Feraheme) 510 mg in sodium chloride 0.9% 117 mL IV.       Visit Type: INFUSION- Every 7 days- 2 of 2 doses- Series complete today       Ordered By: Samuel Zelaya CNP      Accompanied by:Self      Diagnosis: Iron deficiency anemia secondary to inadequate dietary iron intake       Allergies:   Allergies as of 2024 - Reviewed 2024   Allergen Reaction Noted    Atropine Anaphylaxis, Nausea And Vomiting, Palpitations, and Rash 2023    Codeine Itching and Unknown 2023    Ezetimibe Unknown 2023    Iodinated contrast media Other and Syncope 2023    Iodine Nausea/vomiting 10/16/2019    Lisinopril Cough 2023    Metformin hcl Diarrhea 2023    Simvastatin Unknown 2023    Adhesive tape-silicones Rash 2023         Current Medications has a current medication list which includes the following prescription(s): acetaminophen, amitriptyline, ascorbic acid, calcium carbonate, calcium citrate, cimzia, cholecalciferol, cyanocobalamin, dapagliflozin propanediol, prolia, dicyclomine, erythromycin, gabapentin, glimepiride, lansoprazole, leflunomide, multivit-minerals/folic acid, omeprazole, oxybutynin, sertraline, sucralfate, sulfasalazine, and tramadol.       Vitals:   Vitals:    24 1110 24 1153 24 1221   BP: 149/70 133/80 136/78   Pulse: 84 68 69   Resp: 16 16 16   Temp: 36.6 °C (97.9 °F) 36.6 °C (97.8 °F)    TempSrc: Temporal     SpO2: 96%     PainSc:   5     PainLoc: Generalized               Infusion Pre-procedure Checklist:   - Allergies reviewed: yes   - Medications reviewed: yes       - Previous reaction to current treatment: no      Assess patient for the concerns below. Document provider notification as appropriate.    - Is  pregnant:  No- S/p hysterectomy 2022      Pain: 6   - Is the pain different from normal: no   - Is the pain tolerable: yes   - Is your Doctor aware:  yes      Labs:  reviewed results from 7/8/2024 Iron 28, Ferritin 17, T sat 5, HGB b8.9 and Hematocrit 31.5         Fall Risk Screening: More Fall Risk  History of Falling, Immediate or Within 3 Months: No  Secondary Diagnosis: Yes  Ambulatory Aid: Walks without aid/bedrest/nurse assist  Intravenous Therapy/Heparin Lock: No  Gait/Transferring: Normal/bedrest/immobile  Mental Status: Oriented to own ability  More Fall Risk Score: 15       Review Of Systems:  Review of Systems   Constitutional:  Positive for fatigue.   Musculoskeletal:  Positive for arthralgias and myalgias.        Chronic pain   All other systems reviewed and are negative.        Infusion Readiness:   - Assessment Concerns Related to Infusion: No  - Provider notified: no      Document Below Only If Indicated:   New Patient Education:    N/A (returning patient for continuation of therapy. Ongoing education provided as needed.)        Treatment Conditions & Drug Specific Questions:    Ferumoxytol  (FERAHEME)    (Unless otherwise specified on patient specific therapy plan):    REMINDERS:  May cause hypotension, patient should be in a reclined or semi-reclined position during infusion.    Ferumoxytol can interfere with MR imaging and Radiology should be notified if a patient has received ferumoxytol within 3 months of the anticipated MR.    Recommended Vitals/Observation:  Vitals: Obtain vital signs before, immediately following infusion, and 30 minutes after completion of infusion.  Observation: 30 minutes after each infusion.     Lab Results   Component Value Date    IRON 28 (L) 07/08/2024    TIBC 536 (H) 07/08/2024    FERRITIN 17 07/08/2024      Lab Results   Component Value Date    IRONSAT 5 (L) 07/08/2024      Lab Results   Component Value Date    WBC 6.9 07/08/2024    HGB 8.9 (L) 07/08/2024    HCT  31.5 (L) 07/08/2024    MCV 79 (L) 07/08/2024     07/08/2024            Weight Based Drug Calculations:    WEIGHT BASED DRUGS: NOT APPLICABLE / FLAT DOSE      1130 Patient semi reclining during infusion.   1220  Patient tolerated infusion well.  No s/s adverse nreaction noted.  VSS.  30 minute post infusion observation complete.  Series complete today.     Initiated By: Deja Pollack RN

## 2024-08-02 NOTE — PATIENT INSTRUCTIONS
Today :We administered ferumoxytol (Feraheme) 510 mg in sodium chloride 0.9% 117 mL IV.     For:   1. Iron deficiency anemia secondary to inadequate dietary iron intake         Your next appointment is due in:  TBD        Please read the  Medication Guide that was given to you and reviewed during todays visit.     (Tell all doctors including dentists that you are taking this medication)     Go to the emergency room or call 911 if:  -You have signs of allergic reaction:   -Rash, hives, itching.   -Swollen, blistered, peeling skin.   -Swelling of face, lips, mouth, tongue or throat.   -Tightness of chest, trouble breathing, swallowing or talking     Call your doctor:  - If IV / injection site gets red, warm, swollen, itchy or leaks fluid or pus.     (Leave dressing on your IV site for at least 2 hours and keep area clean and dry  - If you get sick or have symptoms of infection or are not feeling well for any reason.    (Wash your hands often, stay away from people who are sick)  - If you have side effects from your medication that do not go away or are bothersome.     (Refer to the teaching your nurse gave you for side effects to call your doctor about)    - Common side effects may include:  stuffy nose, headache, feeling tired, muscle aches, upset stomach  - Before receiving any vaccines     - [facility] at   If:  - You get sick, are on antibiotics, have had a recent vaccine, have surgery or dental work and your doctor wants your visit rescheduled.  - You need to cancel and reschedule your visit for any reason. Call at least 2 days before your visit if you need to cancel.   - Your insurance changes before your next visit.    (We will need to get approval from your new insurance. This can take up to two weeks.)     The [facility] is opened Monday thru Friday. We are closed on weekends and holidays.   Voice mail will take your call if the center is closed. If you leave a  message please allow 24 hours for a call back during weekdays. If you leave a message on a weekend/holiday, we will call you back the next business day.

## 2024-08-08 ENCOUNTER — APPOINTMENT (OUTPATIENT)
Dept: INFUSION THERAPY | Facility: CLINIC | Age: 74
End: 2024-08-08
Payer: MEDICARE

## 2024-08-08 VITALS
OXYGEN SATURATION: 98 % | TEMPERATURE: 97.9 F | HEART RATE: 87 BPM | DIASTOLIC BLOOD PRESSURE: 61 MMHG | BODY MASS INDEX: 28.61 KG/M2 | WEIGHT: 156.4 LBS | SYSTOLIC BLOOD PRESSURE: 139 MMHG | RESPIRATION RATE: 18 BRPM

## 2024-08-08 DIAGNOSIS — M06.09 RHEUMATOID ARTHRITIS OF MULTIPLE SITES WITHOUT RHEUMATOID FACTOR (MULTI): ICD-10-CM

## 2024-08-08 PROCEDURE — 96372 THER/PROPH/DIAG INJ SC/IM: CPT | Performed by: NURSE PRACTITIONER

## 2024-08-08 RX ORDER — DIPHENHYDRAMINE HYDROCHLORIDE 50 MG/ML
50 INJECTION INTRAMUSCULAR; INTRAVENOUS AS NEEDED
OUTPATIENT
Start: 2024-08-29

## 2024-08-08 RX ORDER — FAMOTIDINE 10 MG/ML
20 INJECTION INTRAVENOUS ONCE AS NEEDED
OUTPATIENT
Start: 2024-08-29

## 2024-08-08 RX ORDER — EPINEPHRINE 0.3 MG/.3ML
0.3 INJECTION SUBCUTANEOUS EVERY 5 MIN PRN
OUTPATIENT
Start: 2024-08-29

## 2024-08-08 RX ORDER — ALBUTEROL SULFATE 0.83 MG/ML
3 SOLUTION RESPIRATORY (INHALATION) AS NEEDED
OUTPATIENT
Start: 2024-08-29

## 2024-08-08 ASSESSMENT — ENCOUNTER SYMPTOMS
CHEST TIGHTNESS: 0
COUGH: 0
LEG SWELLING: 0
ABDOMINAL PAIN: 0
FREQUENCY: 0
FATIGUE: 1
DYSURIA: 0

## 2024-08-08 ASSESSMENT — PAIN SCALES - GENERAL: PAINLEVEL: 6

## 2024-08-08 NOTE — PROGRESS NOTES
[facility]   Infusion Clinic Note   Date: 2024   Name: Heather Thurston  : 1950   MRN: [id number]          Reason for Visit: OP Infusion (Prolia)         Today: We administered denosumab.       Visit Type: INJECTION       Ordered By: Dr. KATHLEEN       Accompanied by:Self       Diagnosis: Rheumatoid arthritis of multiple sites without rheumatoid factor (Multi)        Allergies:   Allergies as of 2024 - Reviewed 2024   Allergen Reaction Noted    Atropine Anaphylaxis, Nausea And Vomiting, Palpitations, and Rash 2023    Codeine Itching and Unknown 2023    Ezetimibe Unknown 2023    Iodinated contrast media Other and Syncope 2023    Iodine Nausea/vomiting 10/16/2019    Lisinopril Cough 2023    Metformin hcl Diarrhea 2023    Simvastatin Unknown 2023    Adhesive tape-silicones Rash 2023          Current Medications has a current medication list which includes the following prescription(s): acetaminophen, amitriptyline, ascorbic acid, calcium carbonate, calcium citrate, cimzia, cholecalciferol, cyanocobalamin, dapagliflozin propanediol, prolia, dicyclomine, erythromycin, gabapentin, glimepiride, lansoprazole, leflunomide, multivit-minerals/folic acid, omeprazole, oxybutynin, sertraline, sucralfate, sulfasalazine, and tramadol.       Vitals:   Vitals:    24 1401   BP: 139/61   Pulse: 87   Resp: 18   Temp: 36.6 °C (97.9 °F)   TempSrc: Temporal   SpO2: 98%   Weight: 70.9 kg (156 lb 6.4 oz)   PainSc:   6   PainLoc: Back             Infusion Pre-procedure Checklist:   - Allergies reviewed: yes   - Medications reviewed: yes       - Previous reaction to current treatment: no      Assess patient for the concerns below. Document provider notification as appropriate.  - Active or recent infection with/without current antibiotic use: no  - Recent or planned invasive dental work: no  - Recent or planned surgeries: no  - Recently  "received or plans to receive vaccinations: no  - Has treatment related toxicities: no  - Is pregnant:  n/a      Pain: 6   - Is the pain different from normal: no   - Is the pain tolerable: yes   - Is your Doctor aware:  yes       Labs:  reviewed          Fall Risk Screening: More Fall Risk  History of Falling, Immediate or Within 3 Months: Yes  Secondary Diagnosis: Yes  Ambulatory Aid: Walks without aid/bedrest/nurse assist  Intravenous Therapy/Heparin Lock: No  Gait/Transferring: Normal/bedrest/immobile  Mental Status: Oriented to own ability  More Fall Risk Score: 40       Review Of Systems:  Review of Systems   Constitutional:  Positive for fatigue.   Respiratory:  Negative for chest tightness and cough.    Cardiovascular:  Negative for chest pain and leg swelling.   Gastrointestinal:  Negative for abdominal pain.   Genitourinary:  Negative for dysuria and frequency.    All other systems reviewed and are negative.        ROS completed? Yes      Infusion Readiness:  - Assessment Concerns Related to Infusion: No  - Provider notified: no      Document Below Only If Indicated:   New Patient Education:    N/A (returning patient for continuation of therapy. Ongoing education provided as needed.)        Treatment Conditions & Drug Specific Questions:    Denosumab  (PROLIA. XGEVA)    (Unless otherwise specified on patient specific therapy plan):     TREATMENT CONDITIONS:  Unless otherwise specified on patient specific therapy plan HOLD and notify provider prior to proceeding with today's injection if patients:  o Calcium is LESS THAN 8.6 mg/dL OR  Ionized Calcium LESS THAN 1.1 mmol/L  o Recent or planned invasive dental procedure (within 4 weeks)    Lab Results   Component Value Date    CALCIUM 9.8 07/30/2024    PHOS 3.7 07/15/2020      No results found for: \"CAION\"    Labs reviewed and patient meets treatment conditions? Yes    DRUG SPECIFIC QUESTIONS:  Is the patient taking calcium and vitamin D? " Yes  (Recommended)    Pt Instructed on following risks: (1) hypocalcemia, (2) osteonecrosis of the jaw, (3) atypical femoral fractures, (4) serious infections, and (5) dermatologic reactions?  Yes      REMINDER:  PREGNANCY CATEGORY X DRUG. OBTAIN NEGTATIVE PREGNANCY TEST PRIOR TO FIRST INFUSION FOR WOMEN OF CHILDBEARING ABILITY   REMS DRUG    Recommended Vitals/Observation:  Vitals: Obtain vitals prior to injection.  Observation: Patient may leave immediately following injection.        Weight Based Drug Calculations:    WEIGHT BASED DRUGS: NOT APPLICABLE / FLAT DOSE    1410 Patient tolerated injection well.  RTC for prolia in 6 months.  Next appointment  on n9/26/2024 is for cimzia        Initiated By: Deja Pollack RN

## 2024-08-08 NOTE — PATIENT INSTRUCTIONS
Today :We administered denosumab.     For:   1. Rheumatoid arthritis of multiple sites without rheumatoid factor (Multi)         Your next appointment is due in:  6 months from today        Please read the  Medication Guide that was given to you and reviewed during todays visit.     (Tell all doctors including dentists that you are taking this medication)     Go to the emergency room or call 911 if:  -You have signs of allergic reaction:   -Rash, hives, itching.   -Swollen, blistered, peeling skin.   -Swelling of face, lips, mouth, tongue or throat.   -Tightness of chest, trouble breathing, swallowing or talking     Call your doctor:  - If IV / injection site gets red, warm, swollen, itchy or leaks fluid or pus.     (Leave dressing on your IV site for at least 2 hours and keep area clean and dry  - If you get sick or have symptoms of infection or are not feeling well for any reason.    (Wash your hands often, stay away from people who are sick)  - If you have side effects from your medication that do not go away or are bothersome.     (Refer to the teaching your nurse gave you for side effects to call your doctor about)    - Common side effects may include:  stuffy nose, headache, feeling tired, muscle aches, upset stomach  - Before receiving any vaccines     - Call [facility] at   If:  - You get sick, are on antibiotics, have had a recent vaccine, have surgery or dental work and your doctor wants your visit rescheduled.  - You need to cancel and reschedule your visit for any reason. Call at least 2 days before your visit if you need to cancel.   - Your insurance changes before your next visit.    (We will need to get approval from your new insurance. This can take up to two weeks.)     [facility] is opened Monday thru Friday. We are closed on weekends and holidays.   Voice mail will take your call if the center is closed. If you leave a message please allow 24 hours for a  call back during weekdays. If you leave a message on a weekend/holiday, we will call you back the next business day.

## 2024-08-14 ENCOUNTER — OFFICE VISIT (OUTPATIENT)
Dept: PRIMARY CARE | Facility: CLINIC | Age: 74
End: 2024-08-14
Payer: MEDICARE

## 2024-08-14 VITALS
HEIGHT: 62 IN | WEIGHT: 158 LBS | BODY MASS INDEX: 29.08 KG/M2 | SYSTOLIC BLOOD PRESSURE: 110 MMHG | OXYGEN SATURATION: 95 % | HEART RATE: 81 BPM | TEMPERATURE: 96.7 F | DIASTOLIC BLOOD PRESSURE: 72 MMHG

## 2024-08-14 DIAGNOSIS — D64.9 ANEMIA, UNSPECIFIED TYPE: Primary | ICD-10-CM

## 2024-08-14 PROCEDURE — 3049F LDL-C 100-129 MG/DL: CPT | Performed by: LICENSED PRACTICAL NURSE

## 2024-08-14 PROCEDURE — 3074F SYST BP LT 130 MM HG: CPT | Performed by: LICENSED PRACTICAL NURSE

## 2024-08-14 PROCEDURE — 1159F MED LIST DOCD IN RCRD: CPT | Performed by: LICENSED PRACTICAL NURSE

## 2024-08-14 PROCEDURE — 99213 OFFICE O/P EST LOW 20 MIN: CPT | Performed by: LICENSED PRACTICAL NURSE

## 2024-08-14 PROCEDURE — 3044F HG A1C LEVEL LT 7.0%: CPT | Performed by: LICENSED PRACTICAL NURSE

## 2024-08-14 PROCEDURE — 1125F AMNT PAIN NOTED PAIN PRSNT: CPT | Performed by: LICENSED PRACTICAL NURSE

## 2024-08-14 PROCEDURE — 3078F DIAST BP <80 MM HG: CPT | Performed by: LICENSED PRACTICAL NURSE

## 2024-08-14 PROCEDURE — 1036F TOBACCO NON-USER: CPT | Performed by: LICENSED PRACTICAL NURSE

## 2024-08-14 PROCEDURE — 3008F BODY MASS INDEX DOCD: CPT | Performed by: LICENSED PRACTICAL NURSE

## 2024-08-14 ASSESSMENT — ENCOUNTER SYMPTOMS: FATIGUE: 1

## 2024-08-14 ASSESSMENT — PAIN SCALES - GENERAL: PAINLEVEL: 6

## 2024-08-14 NOTE — PROGRESS NOTES
[facility]: [facility]  PROGRESS NOTE      Heather Thurston is a 74 y.o. female that is presenting today for Follow-up.      Subjective   Pt is a 74yr old female who presents to the office today for follow up on her anemia. Ms Thurston has as PMH of fibromyalgia, DM, and HTN, B12 deficiency. She was seen last in our office on 7/15/24 for her 6 month follow up. At that time Ms. Thurston shared that she felt fatigued as her H/H was 8.9/31.5. She was instructed to contact Dr. Aaron MAURICIO to have her moved up appt due decreasing H/H. Since then she shares that she received 2 iron infusions and is scheduled for follow with in person on 8/22/24 where they will discuss lower scope. Ms. Thurston reports feeling an improvement in her SOB, but still notes some mild fatigue.            Review of Systems   Constitutional:  Positive for fatigue.      Objective   Vitals:    08/14/24 1413   BP: 110/72   Pulse: 81   Temp: 35.9 °C (96.7 °F)   SpO2: 95%      Body mass index is 28.9 kg/m².  Physical Exam  Constitutional:       General: She is not in acute distress.     Appearance: She is not ill-appearing, toxic-appearing or diaphoretic.   Cardiovascular:      Rate and Rhythm: Normal rate and regular rhythm.   Pulmonary:      Effort: Pulmonary effort is normal. No respiratory distress.      Breath sounds: Normal breath sounds. No stridor. No wheezing, rhonchi or rales.   Skin:     General: Skin is warm and dry.       Diagnostic Results   Lab Results   Component Value Date    GLUCOSE 91 07/30/2024    CALCIUM 9.8 07/30/2024     07/30/2024    K 4.2 07/30/2024    CO2 31 07/30/2024     07/30/2024    BUN 12 07/30/2024    CREATININE 0.72 07/30/2024     Lab Results   Component Value Date    ALT 12 07/30/2024    AST 19 07/30/2024    ALKPHOS 38 07/30/2024    BILITOT 0.2 07/30/2024     Lab Results   Component Value Date    WBC 6.9 07/08/2024    HGB 8.9 (L) 07/08/2024    HCT 31.5 (L) 07/08/2024    MCV 79 (L)  "07/08/2024     07/08/2024     Lab Results   Component Value Date    CHOL 184 07/08/2024    CHOL 164 07/21/2022    CHOL 150 07/22/2021     Lab Results   Component Value Date    HDL 59.0 07/08/2024    HDL 59 07/21/2022    HDL 63 07/22/2021     Lab Results   Component Value Date    LDLCALC 101 07/08/2024    LDLCALC 91 07/21/2022    LDLCALC 74 07/22/2021     Lab Results   Component Value Date    TRIG 118 07/08/2024    TRIG 70 07/21/2022    TRIG 66 07/22/2021     No components found for: \"CHOLHDL\"  Lab Results   Component Value Date    HGBA1C 6.8 (H) 07/08/2024     Other labs not included in the list above were reviewed either before or during this encounter.    History    Past Medical History:   Diagnosis Date    Adrenal mass     '05 to '14 no change by CT    Anxiety 08/26/2023    Chronic arthritis     tramadol prn    COVID     CVA (cerebral vascular accident)     DJD (degenerative joint disease)     Epigastric pain 11/22/2022    Fibromyalgia     GERD (gastroesophageal reflux disease)     Gout     HLD (hyperlipidemia)     HTN (hypertension)     htn ecg nsr    Intestinal malabsorption (HHS-HCC) 08/26/2023    Osteoporosis     DEXA OSTEOPOROSIS pROLIA    Overactive bladder 08/26/2023    Personal history of other diseases of the musculoskeletal system and connective tissue     History of rheumatoid arthritis    Plantar fasciitis of left foot     Rheumatoid arthritis of multiple sites without rheumatoid factor (Multi) 11/08/2023    Sinusitis 02/29/2024    Thyroid nodule     10/22  ENT  eval neg.     Past Surgical History:   Procedure Laterality Date    EYE SURGERY      LOWER EYELID, LID RAISE    LAPAROSCOPIC GASTRIC BANDING  05/21/2007    LAPAROSCOPIC ADJUSTABLE GASTRIC BAND UTILIZING 11 CM VANGUARD BAND, ENTEROLYSIS ([facility]IVID[facility])    OTHER SURGICAL HISTORY  12/19/2022    Knee replacement    OTHER SURGICAL HISTORY  12/19/2022    Carpal tunnel surgery    OTHER SURGICAL HISTORY  12/19/2022    Cholecystectomy    OTHER " SURGICAL HISTORY  12/19/2022    Hysterectomy    TOTAL KNEE ARTHROPLASTY      LEFT TOTAL KNEE ARTHROPLASTY (DR. DOUGLAS)    TOTAL KNEE ARTHROPLASTY  2015    RT TKA (DR. HARRIS)     Family History   Problem Relation Name Age of Onset    Lung cancer Mother      Liver cancer Father      Diabetes Father      Hypertension Father      Hypertension Brother      Obesity Brother      Diabetes Brother      Obesity Daughter      Hypertension Daughter      Diabetes Daughter       Social History     Socioeconomic History    Marital status:      Spouse name: Not on file    Number of children: Not on file    Years of education: Not on file    Highest education level: Not on file   Occupational History    Not on file   Tobacco Use    Smoking status: Never     Passive exposure: Never    Smokeless tobacco: Never   Vaping Use    Vaping status: Never Used   Substance and Sexual Activity    Alcohol use: Never    Drug use: Never    Sexual activity: Not on file   Other Topics Concern    Not on file   Social History Narrative    Not on file     Social Determinants of Health     Financial Resource Strain: Not on file   Food Insecurity: Not on file   Transportation Needs: Not on file   Physical Activity: Not on file   Stress: Not on file   Social Connections: Not on file   Intimate Partner Violence: Not At Risk (3/21/2024)    Humiliation, Afraid, Rape, and Kick questionnaire     Fear of Current or Ex-Partner: No     Emotionally Abused: No     Physically Abused: No     Sexually Abused: No   Housing Stability: Not on file     Allergies   Allergen Reactions    Atropine Anaphylaxis, Nausea And Vomiting, Palpitations and Rash     HEART RACING    Codeine Itching and Unknown    Ezetimibe Unknown    Iodinated Contrast Media Other and Syncope     SYNCOPE    NAUSEA    VOMITING    Iodine Nausea/vomiting    Lisinopril Cough    Metformin Hcl Diarrhea    Simvastatin Unknown    Adhesive Tape-Silicones Rash     Current Outpatient Medications on File  Prior to Visit   Medication Sig Dispense Refill    acetaminophen (Tylenol 8 HOUR) 650 mg ER tablet Take 1 tablet (650 mg) by mouth every 8 hours if needed for mild pain (1 - 3). Do not crush, chew, or split.  Tylenol arthritis takes 2 a day      amitriptyline (Elavil) 10 mg tablet Take 2 tablets (20 mg) by mouth once daily at bedtime.      ascorbic acid (Vitamin C) 250 MG chewable tablet Chew 1 tablet (250 mg). Gummies dose unknown      calcium carbonate (Tums) 200 mg calcium chewable tablet Chew 1 tablet (500 mg) once daily.      CALCIUM CITRATE ORAL Take 500 mg by mouth 2 times a day.      certolizumab pegol (Cimzia) injection Inject 2 mL (400 mg) under the skin every 28 (twenty-eight) days. 2 mL 11    cholecalciferol (Vitamin D3) 1,250 mcg (50,000 unit) tablet Take 1 capsule by mouth 1 (one) time per week.      cyanocobalamin (Vitamin B-12) 1,000 mcg/mL injection Inject into the shoulder, thigh, or buttocks every 30 (thirty) days.      dapagliflozin propanediol (Farxiga) 10 mg Take 1 tablet (10 mg) by mouth early in the morning..      denosumab (Prolia) 60 mg/mL syringe Inject 1 mL (60 mg total) under the skin every 6 months.  As directed 1 mL 1    dicyclomine (Bentyl) 10 mg capsule Take 1 capsule (10 mg) by mouth 3 times a day.      erythromycin (Romycin) 5 mg/gram (0.5 %) ophthalmic ointment APPLY 1/2 INCH ON EYELID THREE TIMES DAILY FOR USE AFTER SURGERY      gabapentin (Neurontin) 300 mg capsule Take 1 capsule (300 mg) by mouth 3 times a day. 270 capsule 1    glimepiride (Amaryl) 2 mg tablet Take 0.5 tablets (1 mg) by mouth once daily in the morning. Take before meals.      lansoprazole (Prevacid) 15 mg DR capsule Take 1 capsule (15 mg) by mouth once daily in the morning. Take before meals. Do not crush or chew.      leflunomide (Arava) 20 mg tablet TAKE 1 TABLET(20 MG) BY MOUTH DAILY 90 tablet 1    multivit-minerals/folic acid (ADULT ONE DAILY MULTIVITAMIN ORAL) Take by mouth.      omeprazole (PriLOSEC) 40  mg DR capsule Take 1 capsule (40 mg) by mouth 2 times a day before meals. 180 capsule 1    oxybutynin (Ditropan) 5 mg tablet Take 1 tablet (5 mg) by mouth once daily. 90 tablet 2    sertraline (Zoloft) 50 mg tablet Take 1 tablet (50 mg) by mouth once daily. 90 tablet 3    sucralfate (Carafate) 1 gram tablet Take 1 tablet (1 g) by mouth. ON AN EMPTY STOMACH, CRUSH 3 TIMES DAILY      sulfaSALAzine (Azulfidine) 500 mg tablet Take 1 tablet (500 mg) by mouth 2 times a day.      traMADol (Ultram) 50 mg tablet Take 1 tablet (50 mg) by mouth once daily as needed for moderate pain (4 - 6) (OA/RA). 90 tablet 0     No current facility-administered medications on file prior to visit.     Immunization History   Administered Date(s) Administered    Flu vaccine, quadrivalent, high-dose, preservative free, age 65y+ (FLUZONE) 10/21/2020, 10/21/2021, 12/05/2023    Flu vaccine, trivalent, preservative free, HIGH-DOSE, age 65y+ (Fluzone) 10/07/2016, 11/14/2018, 01/08/2020    Hepatitis B vaccine, adult *Check Product/Dose* 10/11/2005, 12/13/2005, 08/25/2006    Influenza, Seasonal, Quadrivalent, Adjuvanted 10/18/2022    Influenza, injectable, quadrivalent 11/14/2017    Influenza, seasonal, injectable 11/01/2008, 01/04/2011, 10/19/2011, 10/01/2014    Influenza, trivalent, adjuvanted 10/05/2020, 10/20/2020, 10/22/2021    Novel influenza-H1N1-09, preservative-free 01/06/2010    Pfizer COVID-19 vaccine, bivalent, age 12 years and older (30 mcg/0.3 mL) 10/24/2022    Pfizer Purple Cap SARS-CoV-2 03/08/2021, 03/29/2021, 10/22/2021    Pneumococcal conjugate vaccine, 13-valent (PREVNAR 13) 09/01/2015    Pneumococcal conjugate vaccine, 20-valent (PREVNAR 20) 10/18/2022    Pneumococcal polysaccharide vaccine, 23-valent, age 2 years and older (PNEUMOVAX 23) 01/01/2002, 01/04/2011    Tdap vaccine, age 7 year and older (BOOSTRIX, ADACEL) 01/08/2020    Zoster vaccine, recombinant, adult (SHINGRIX) 11/01/2022    Zoster, live 09/07/2012     Patient's  medical history was reviewed and updated either before or during this encounter.       Assessment/Plan   Problem List Items Addressed This Visit       Anemia - Primary    Relevant Orders    CBC     I shared with Ms Thurston that her fatigue may take time to resolve. She will get her  CBC as this was ordered, but she had not gotten it yet. I will also follow up on her iron levels as this was ordered by GI. We will update her with her results. No follow up necessary at this time as she is scheduled to see her new PCP , Dr. Queen in October.   ROD Montgomery-CNP

## 2024-08-14 NOTE — PROGRESS NOTES
Subjective   Patient ID: Heather Thurston is a 74 y.o. female who presents for No chief complaint on file..  HPI  Review motility/ b12 injection  B12 injection administered by sd to left deltoid  Review of Systems  CONSTITUTIONAL:          Chills No.  Fatigue No.  Fever No.         CARDIOLOGY:          Negative for dizziness, chest pain, palpitations, shortness of breath.         RESPIRATORY:          Sleep Apnea No.  Negative for chest congestion, cough, wheezing.         GASTROENTEROLOGY:          Food Intolerance No.  Abdominal pain DESCRIBED as stomach burning  Acid reflux No.  Black stools No.  Constipation No.  Diarrhea yes Loss of appetite no.  Nausea YES.  Vomiting No.         UROLOGY:          Negative for dysuria, urinary urgency, kidney stones.         PSYCHOLOGY:          ADMITS TO  depression, anxiety, high stress level.    Objective   Physical Exam    Assessment/Plan            Blanche Ware LPN 08/14/24 2:09 PM

## 2024-08-19 ENCOUNTER — LAB (OUTPATIENT)
Dept: LAB | Facility: LAB | Age: 74
End: 2024-08-19
Payer: MEDICARE

## 2024-08-19 DIAGNOSIS — M06.09 RHEUMATOID ARTHRITIS OF MULTIPLE SITES WITHOUT RHEUMATOID FACTOR (MULTI): ICD-10-CM

## 2024-08-19 DIAGNOSIS — E21.3 HYPERPARATHYROIDISM (MULTI): ICD-10-CM

## 2024-08-19 DIAGNOSIS — R53.83 OTHER FATIGUE: ICD-10-CM

## 2024-08-19 DIAGNOSIS — D64.9 ANEMIA, UNSPECIFIED TYPE: ICD-10-CM

## 2024-08-19 DIAGNOSIS — D50.8 IRON DEFICIENCY ANEMIA SECONDARY TO INADEQUATE DIETARY IRON INTAKE: ICD-10-CM

## 2024-08-19 DIAGNOSIS — K90.9 INTESTINAL MALABSORPTION, UNSPECIFIED TYPE (HHS-HCC): ICD-10-CM

## 2024-08-19 DIAGNOSIS — E55.9 VITAMIN D DEFICIENCY: ICD-10-CM

## 2024-08-19 LAB
25(OH)D3 SERPL-MCNC: 22 NG/ML (ref 31–100)
ALBUMIN SERPL-MCNC: 4.3 G/DL (ref 3.5–5)
ALP BLD-CCNC: 46 U/L (ref 35–125)
ALT SERPL-CCNC: 9 U/L (ref 5–40)
ANION GAP SERPL CALC-SCNC: 8 MMOL/L
AST SERPL-CCNC: 18 U/L (ref 5–40)
BASOPHILS # BLD AUTO: 0.08 X10*3/UL (ref 0–0.1)
BASOPHILS NFR BLD AUTO: 1.5 %
BILIRUB SERPL-MCNC: 0.2 MG/DL (ref 0.1–1.2)
BUN SERPL-MCNC: 11 MG/DL (ref 8–25)
CALCIUM SERPL-MCNC: 9.7 MG/DL (ref 8.5–10.4)
CHLORIDE SERPL-SCNC: 104 MMOL/L (ref 97–107)
CO2 SERPL-SCNC: 28 MMOL/L (ref 24–31)
CREAT SERPL-MCNC: 0.8 MG/DL (ref 0.4–1.6)
EGFRCR SERPLBLD CKD-EPI 2021: 77 ML/MIN/1.73M*2
EOSINOPHIL # BLD AUTO: 0.29 X10*3/UL (ref 0–0.4)
EOSINOPHIL NFR BLD AUTO: 5.3 %
ERYTHROCYTE [DISTWIDTH] IN BLOOD BY AUTOMATED COUNT: 25.2 % (ref 11.5–14.5)
FERRITIN SERPL-MCNC: 506 NG/ML (ref 13–150)
GLUCOSE SERPL-MCNC: 105 MG/DL (ref 65–99)
HCT VFR BLD AUTO: 39.4 % (ref 36–46)
HGB BLD-MCNC: 11.8 G/DL (ref 12–16)
IMM GRANULOCYTES # BLD AUTO: 0.01 X10*3/UL (ref 0–0.5)
IMM GRANULOCYTES NFR BLD AUTO: 0.2 % (ref 0–0.9)
IRON SATN MFR SERPL: 30 % (ref 12–50)
IRON SERPL-MCNC: 107 UG/DL (ref 30–160)
LYMPHOCYTES # BLD AUTO: 1.82 X10*3/UL (ref 0.8–3)
LYMPHOCYTES NFR BLD AUTO: 33.3 %
MCH RBC QN AUTO: 25.8 PG (ref 26–34)
MCHC RBC AUTO-ENTMCNC: 29.9 G/DL (ref 32–36)
MCV RBC AUTO: 86 FL (ref 80–100)
MONOCYTES # BLD AUTO: 0.66 X10*3/UL (ref 0.05–0.8)
MONOCYTES NFR BLD AUTO: 12.1 %
NEUTROPHILS # BLD AUTO: 2.6 X10*3/UL (ref 1.6–5.5)
NEUTROPHILS NFR BLD AUTO: 47.6 %
NRBC BLD-RTO: 0 /100 WBCS (ref 0–0)
PLATELET # BLD AUTO: 207 X10*3/UL (ref 150–450)
POTASSIUM SERPL-SCNC: 4.3 MMOL/L (ref 3.4–5.1)
PROT SERPL-MCNC: 6.4 G/DL (ref 5.9–7.9)
PTH-INTACT SERPL-MCNC: 190.6 PG/ML (ref 18.5–88)
RBC # BLD AUTO: 4.57 X10*6/UL (ref 4–5.2)
RBC MORPH BLD: NORMAL
SODIUM SERPL-SCNC: 140 MMOL/L (ref 133–145)
TIBC SERPL-MCNC: 361 UG/DL (ref 228–428)
UIBC SERPL-MCNC: 254 UG/DL (ref 110–370)
WBC # BLD AUTO: 5.5 X10*3/UL (ref 4.4–11.3)

## 2024-08-19 PROCEDURE — 80053 COMPREHEN METABOLIC PANEL: CPT

## 2024-08-19 PROCEDURE — 82728 ASSAY OF FERRITIN: CPT

## 2024-08-19 PROCEDURE — 85652 RBC SED RATE AUTOMATED: CPT

## 2024-08-19 PROCEDURE — 82306 VITAMIN D 25 HYDROXY: CPT

## 2024-08-19 PROCEDURE — 83540 ASSAY OF IRON: CPT

## 2024-08-19 PROCEDURE — 83550 IRON BINDING TEST: CPT

## 2024-08-19 PROCEDURE — 85025 COMPLETE CBC W/AUTO DIFF WBC: CPT

## 2024-08-19 PROCEDURE — 86140 C-REACTIVE PROTEIN: CPT

## 2024-08-19 PROCEDURE — 36415 COLL VENOUS BLD VENIPUNCTURE: CPT

## 2024-08-19 PROCEDURE — 83970 ASSAY OF PARATHORMONE: CPT

## 2024-08-20 DIAGNOSIS — D64.9 ANEMIA, UNSPECIFIED TYPE: Primary | ICD-10-CM

## 2024-08-20 DIAGNOSIS — R53.83 OTHER FATIGUE: ICD-10-CM

## 2024-08-20 LAB
CRP SERPL-MCNC: <0.3 MG/DL (ref 0–2)
ERYTHROCYTE [SEDIMENTATION RATE] IN BLOOD BY WESTERGREN METHOD: 8 MM/H (ref 0–30)

## 2024-08-22 ENCOUNTER — APPOINTMENT (OUTPATIENT)
Dept: SURGERY | Facility: CLINIC | Age: 74
End: 2024-08-22
Payer: MEDICARE

## 2024-08-22 VITALS
SYSTOLIC BLOOD PRESSURE: 137 MMHG | WEIGHT: 155 LBS | HEART RATE: 78 BPM | HEIGHT: 62 IN | BODY MASS INDEX: 28.52 KG/M2 | DIASTOLIC BLOOD PRESSURE: 73 MMHG

## 2024-08-22 DIAGNOSIS — E21.3 HYPERPARATHYROIDISM (MULTI): ICD-10-CM

## 2024-08-22 DIAGNOSIS — K90.9 INTESTINAL MALABSORPTION, UNSPECIFIED TYPE (HHS-HCC): Primary | ICD-10-CM

## 2024-08-22 DIAGNOSIS — E53.8 VITAMIN B 12 DEFICIENCY: ICD-10-CM

## 2024-08-22 DIAGNOSIS — E55.9 VITAMIN D DEFICIENCY: ICD-10-CM

## 2024-08-22 DIAGNOSIS — K44.9 HIATAL HERNIA: ICD-10-CM

## 2024-08-22 DIAGNOSIS — Z98.84 HISTORY OF GASTRIC BYPASS: ICD-10-CM

## 2024-08-22 DIAGNOSIS — R13.10 DYSPHAGIA, UNSPECIFIED TYPE: ICD-10-CM

## 2024-08-22 PROCEDURE — 3044F HG A1C LEVEL LT 7.0%: CPT | Performed by: SURGERY

## 2024-08-22 PROCEDURE — 96372 THER/PROPH/DIAG INJ SC/IM: CPT | Performed by: SURGERY

## 2024-08-22 PROCEDURE — 1159F MED LIST DOCD IN RCRD: CPT | Performed by: SURGERY

## 2024-08-22 PROCEDURE — 3049F LDL-C 100-129 MG/DL: CPT | Performed by: SURGERY

## 2024-08-22 PROCEDURE — 1125F AMNT PAIN NOTED PAIN PRSNT: CPT | Performed by: SURGERY

## 2024-08-22 PROCEDURE — 3008F BODY MASS INDEX DOCD: CPT | Performed by: SURGERY

## 2024-08-22 PROCEDURE — 3078F DIAST BP <80 MM HG: CPT | Performed by: SURGERY

## 2024-08-22 PROCEDURE — 99214 OFFICE O/P EST MOD 30 MIN: CPT | Performed by: SURGERY

## 2024-08-22 PROCEDURE — 3075F SYST BP GE 130 - 139MM HG: CPT | Performed by: SURGERY

## 2024-08-22 RX ORDER — CYANOCOBALAMIN 1000 UG/ML
1000 INJECTION, SOLUTION INTRAMUSCULAR; SUBCUTANEOUS ONCE
Status: COMPLETED | OUTPATIENT
Start: 2024-08-22 | End: 2024-08-22

## 2024-08-22 ASSESSMENT — COLUMBIA-SUICIDE SEVERITY RATING SCALE - C-SSRS
2. HAVE YOU ACTUALLY HAD ANY THOUGHTS OF KILLING YOURSELF?: NO
6. HAVE YOU EVER DONE ANYTHING, STARTED TO DO ANYTHING, OR PREPARED TO DO ANYTHING TO END YOUR LIFE?: NO
1. IN THE PAST MONTH, HAVE YOU WISHED YOU WERE DEAD OR WISHED YOU COULD GO TO SLEEP AND NOT WAKE UP?: NO

## 2024-08-22 ASSESSMENT — PAIN SCALES - GENERAL: PAINLEVEL: 6

## 2024-08-22 ASSESSMENT — PATIENT HEALTH QUESTIONNAIRE - PHQ9
1. LITTLE INTEREST OR PLEASURE IN DOING THINGS: NOT AT ALL
2. FEELING DOWN, DEPRESSED OR HOPELESS: NOT AT ALL
SUM OF ALL RESPONSES TO PHQ9 QUESTIONS 1 AND 2: 0

## 2024-08-22 NOTE — H&P
History Of Present Illness  Heather Thurston is a 74 y.o. woman here for follow up.  She had an adjustable gastric band placed many years ago that was removed and converted to RYGB.  She is having burning in between meals that resolves with eating something bland.  The symptoms have not improved with ppi.   She had normal EGD.  She had an EMOT showing low DCI.  1/3 of swallows were normal.  60% of swallows were not.     Past Medical History  She has a past medical history of Adrenal mass, Anxiety (08/26/2023), Chronic arthritis, COVID, CVA (cerebral vascular accident), DJD (degenerative joint disease), Epigastric pain (11/22/2022), Fibromyalgia, GERD (gastroesophageal reflux disease), Gout, HLD (hyperlipidemia), HTN (hypertension), Intestinal malabsorption (HHS-HCC) (08/26/2023), Osteoporosis, Overactive bladder (08/26/2023), Personal history of other diseases of the musculoskeletal system and connective tissue, Plantar fasciitis of left foot, Rheumatoid arthritis of multiple sites without rheumatoid factor (Multi) (11/08/2023), Sinusitis (02/29/2024), and Thyroid nodule.    Surgical History  She has a past surgical history that includes Other surgical history (12/19/2022); Other surgical history (12/19/2022); Other surgical history (12/19/2022); Other surgical history (12/19/2022); Laparoscopic gastric banding (05/21/2007); Total knee arthroplasty; Eye surgery; and Total knee arthroplasty (2015).     Social History  She reports that she has never smoked. She has never been exposed to tobacco smoke. She has never used smokeless tobacco. She reports that she does not drink alcohol and does not use drugs.    Family History  Family History   Problem Relation Name Age of Onset    Lung cancer Mother      Liver cancer Father      Diabetes Father      Hypertension Father      Hypertension Brother      Obesity Brother      Diabetes Brother      Obesity Daughter      Hypertension Daughter      Diabetes Daughter         "  Allergies  Atropine, Codeine, Ezetimibe, Iodinated contrast media, Iodine, Lisinopril, Metformin hcl, Simvastatin, and Adhesive tape-silicones    Review of Systems   CONSTITUTIONAL:          Chills No.  Fatigue No.  Fever No.         CARDIOLOGY:          Negative for dizziness, chest pain, palpitations, shortness of breath.         RESPIRATORY:          Sleep Apnea No.  Negative for chest congestion, cough, wheezing.         GASTROENTEROLOGY:          Food Intolerance No.  Abdominal pain DESCRIBED as stomach burning  Acid reflux No.  Black stools No.  Constipation No.  Diarrhea yes Loss of appetite no.  Nausea YES.  Vomiting No.         UROLOGY:          Negative for dysuria, urinary urgency, kidney stones.         PSYCHOLOGY:          ADMITS TO  depression, anxiety, high stress level.    Physical Exam     Last Recorded Vitals  Blood pressure 137/73, pulse 78, height 1.581 m (5' 2.25\"), weight 70.3 kg (155 lb).    Relevant Results  Esophagogastroduodenoscopy (EGD)  Order# [id number]  Reading physician: Martinez Song MD Ordering provider: Kevin Walker MD Study date: 6/25/24     Result Information    Status: Final result (Exam End: 6/25/2024 08:37) Provider Status: Reviewed     Result Text    Result Text   Impression  The upper third of the esophagus, middle third of the esophagus, lower third of the esophagus and GE junction appeared normal.  Healthy previous Juancho-en-Y gastric bypass in the gastric pouch, gastrojejunal anastomosis and juancho limb  Esophageal manometry catheter placement confirmed with tip at GJ anastamosis.        Findings  The upper third of the esophagus, middle third of the esophagus, lower third of the esophagus and GE junction appeared normal.  Healthy previous Juancho-en-Y gastric bypass in the gastric pouch, gastrojejunal anastomosis and juancho limb; no bleeding was identified  Esophageal manometry catheter placement confirmed with tip at GJ anastamosis.        Recommendation    Follow up with " PCP            Indication  Gastroesophageal reflux disease, unspecified whether esophagitis present     Staff  Staff Role   Martinez Song MD Proceduralist      Esophageal Manometry  Order# [id number]  Reading physician: Kayla Paiz MD MPH Ordering provider: Kevin Walker MD Study date: 24     Result Information    Status: Final result (Exam End: 2024 09:14) Provider Status: Reviewed     Result Text    Result Text      Patient:  Heather Thurston    [id number] Gender: Female Physician: Dr Paiz     / Age: 1950 : Aivs Sanders    Height:   Referring Physician: Dr Walker    Procedure: Esophageal HRM Examination Date: 2024           Swallow Composite (mean of 15 swallows) Resting Pressure Profile & Anatomy        Basal Pressures*  LES, respiratory mean(mmHg)              32.0 (13-43)  UES mean(mmHg)              58.9 ()     Anatomy*  LES proximal(cm)              40.5  LES intraabdominal(cm)              0.0  Esophageal length(cm)              24.0  Hiatal hernia              No         Motility*  Dist. wave amplitude(mmHg)              35.8 ()  Wave dur. @ LES -3.0 & 7.0(s)              3.0 (2.7-5.4)  Onset dimas. (LES -11.0 to -3.0)(cm/s)              0.2 (2.8-6.3)  Percent peristaltic(%)              80  Percent simultaneous(%)              20 (<=10%)  Percent failed(%)              0 (0%)  Distal contr. integral(mmHg-cm-s)              478.4 (500-5000)  Number of hypercontractile swallows              0  Incomplete bolus clearance(%)              0  Bolus transit time(s)              -0.3    Residual Pressures*  LES (median)(mmHg)              8.9 (<15.0)  UES (mean)(mmHg)              23.9 (<12.0)   *Notes. Motility values are mean among swallows; Normal values in (xxx.x):  Simultaneous contractions: Velocity > 8.0 cm/s; eSlv: eSleeve; 3SN, IRP, DCI, IBP - See manual definitions     Lower Esophageal Sphincter Region   Normal Esophageal Motility   Normal   Landmarks      Number of swallows evaluated 15         Proximal LES (from nares)(cm) 40.5   Bailey Classification           LES length(cm) 3.6 2.7-4.8     % failed 33         Esophageal length (LES-UES centers)(cm) 24.0       % weak 27         Intraabdominal LES length(cm) 0.0       % ineffective 60         Hiatal hernia? No       % panesophageal pressurization 13     LES Pressures         % premature contraction 20         Pressure latoya. method eSleeve,IRP       % fragmented 7         Basal (respiratory mean)(mmHg) 32.0 13-43     % intact 33         Residual (median)(mmHg) 8.9 <15.0     Number of hypercontractile swallows 0           Additional High Resolution Parameters                 Distal contractile integral(mean)(mmHg-cm-s) 478.4 500-5000             Distal contractile integral(highest)(mmHg-cm-s) 946.8           Evaluated @ 3.0 - 11.0 above LES                 Peristaltic (velocity <= 6.25 cm/s)(%) 80               Simultaneous (dimas. >= 6.25 cm/s)(%) 20 <=10%             Failed(%) 0 0%         Evaluated @ 3.0 & 7.0 above LES                 Mean wave amplitude(mmHg) 35.8              Mean wave duration(s) 3.0 2.7-5.4             Double-peaked waves(%) 10 <=15%             Triple-peaked waves(%) 10 0%         Velocity (11.0-3.0 above LES)(cm/s) 0.2 2.8-6.3         Impedance analysis                 Incomplete bolus clearance(%) 0               Bolus transit time (sec) -0.3                   Upper Esophageal Sphincter   Normal Pharyngeal / UES Motility   Normal   Mean basal pressure(mmHg) 58.9  No. swallows evaluated 15     Mean residual pressure(mmHg) 23.9 <12.0 Evaluated @ 2.0 & 3.0 above UES                 Mean peak pressure(mmHg) 40.4              Bailey Classification Findings*   EGJ: Normal Relaxation          Median IRP (8.9 mmHg) is less than 15 mmHg  Esophageal body: Spastic esophagus          % premature contraction (20%) is greater than 20%  Finding: Distal Esophageal Spasm     EGJ: Normal  Relaxation          Median IRP (8.9 mmHg) is less than 15 mmHg  Esophageal body: Ineffective swallows, Not all swallows have failed peristalsis          % ineffective swallows (60%) is greater than 50%  Finding: Ineffective Esophageal Motility (IEM)  * Findings are based on published [place] Classification scheme and are only intended to serve as a guide for patient diagnosis      Procedure         Indications   GERD      Interpretation / Findings   The study of the LES reveals a normal length and a hiatal hernia.  The LES pressure is normal at 35 mmHg and relaxes completely.  The esophageal body demonstrates normal peristalsis (80%) and low contraction amplitudes (, amplitudes 35.8 mmHg).  60% of swallows are ineffective.  bolus transit is normal.       Impressions   This study overall demonstrates a small hiatal hernia and IEM.       No images are attached to the encounter.    Latest Reference Range & Units 08/19/24 13:10   GLUCOSE 65 - 99 mg/dL 105 (H)   SODIUM 133 - 145 mmol/L 140   POTASSIUM 3.4 - 5.1 mmol/L 4.3   CHLORIDE 97 - 107 mmol/L 104   Bicarbonate 24 - 31 mmol/L 28   Anion Gap <=19 mmol/L 8   Blood Urea Nitrogen 8 - 25 mg/dL 11   Creatinine 0.40 - 1.60 mg/dL 0.80   EGFR >60 mL/min/1.73m*2 77   Calcium 8.5 - 10.4 mg/dL 9.7   Albumin 3.5 - 5.0 g/dL 4.3   Alkaline Phosphatase 35 - 125 U/L 46   ALT 5 - 40 U/L 9   AST 5 - 40 U/L 18   Bilirubin Total 0.1 - 1.2 mg/dL 0.2   FERRITIN 13 - 150 ng/mL 506 (H)   Total Protein 5.9 - 7.9 g/dL 6.4   IRON 30 - 160 ug/dL 107   C-Reactive Protein 0.00 - 2.00 mg/dL <0.30   TIBC 228 - 428 ug/dL 361   UIBC 110 - 370 ug/dL 254   % Saturation 12 - 50 % 30   Parathyroid Hormone, Intact 18.5 - 88.0 pg/mL 190.6 (H)   Vitamin D, 25-Hydroxy, Total 31 - 100 ng/mL 22 (L)   LEUKOCYTES (10*3/UL) IN BLOOD BY AUTOMATED COUNT, Sinhala 4.4 - 11.3 x10*3/uL 5.5   nRBC 0.0 - 0.0 /100 WBCs 0.0   ERYTHROCYTES (10*6/UL) IN BLOOD BY AUTOMATED COUNT, Sinhala 4.00 - 5.20 x10*6/uL 4.57    HEMOGLOBIN 12.0 - 16.0 g/dL 11.8 (L)   HEMATOCRIT 36.0 - 46.0 % 39.4   MCV 80 - 100 fL 86   MCH 26.0 - 34.0 pg 25.8 (L)   MCHC 32.0 - 36.0 g/dL 29.9 (L)   RED CELL DISTRIBUTION WIDTH 11.5 - 14.5 % 25.2 (H)   PLATELETS (10*3/UL) IN BLOOD AUTOMATED COUNT, [place]an 150 - 450 x10*3/uL 207   NEUTROPHILS/100 LEUKOCYTES IN BLOOD BY AUTOMATED COUNT, [place] 40.0 - 80.0 % 47.6   Immature Granulocytes %, Automated 0.0 - 0.9 % 0.2   Lymphocytes % 13.0 - 44.0 % 33.3   Monocytes % 2.0 - 10.0 % 12.1   Eosinophils % 0.0 - 6.0 % 5.3   Basophils % 0.0 - 2.0 % 1.5   NEUTROPHILS (10*3/UL) IN BLOOD BY AUTOMATED COUNT, [place]an 1.60 - 5.50 x10*3/uL 2.60   Immature Granulocytes Absolute, Automated 0.00 - 0.50 x10*3/uL 0.01   Lymphocytes Absolute 0.80 - 3.00 x10*3/uL 1.82   Monocytes Absolute 0.05 - 0.80 x10*3/uL 0.66   Eosinophils Absolute 0.00 - 0.40 x10*3/uL 0.29   Basophils Absolute 0.00 - 0.10 x10*3/uL 0.08   RBC Morphology  No significant RBC morphology present   Sed Rate 0 - 30 mm/h 8   (H): Data is abnormally high  (L): Data is abnormally low  FL upper GI double contrast W  KUB  Status: Final result     Signed by    Signed Time Phone Pager   Jose Reddy MD 3/15/2022 15:30       Exam Information    Status Exam Begun Exam Ended   Final 3/15/2022 12:42      Study Result    Narrative & Impression   PROCEDURE:         UGI AIR CONTRAST W KUB - CXR  0133  REASON FOR EXAM: GASTRO-ESOPHAGEAL REFLUX DISEASE WITHOUT ESOPHAGITIS     RESULT: CLINICAL HISTORY: Patient has a history of gastric band and removal  and subsequent gastric bypass/K 21.9/stomach pain for couple of years     COMPARISON: Upper GI 10/24/2018     FINDINGS:     The KUB reveals nonobstructive bowel gas pattern. Postsurgical changes of  the abdomen can be seen.     Thin barium was utilized for this study without effervescent crystals.     The patient swallowed barium without difficulty. No laryngeal penetration or  aspiration was identified. The esophageal motility  is within normal limits.     No significant strictures, erosions, intrinsic or extrinsic masses can be  identified. No hiatal hernia is identified.     Very minimal gastric reflux can be seen on water siphon test. This was at  the level of the GE junction within the lower esophagus.     The gastric pouch is well opacified. The Rory limb is also opacified. No  definite strictures or filling defects can be seen.     No contrast is identified within the excluded stomach.     Total fluoroscopy time: 105 mary. , images: 4, DAP: 85.53 mGy.     IMPRESSION:     1. Mild gastroesophageal reflux.  2. Otherwise unremarkable study.     [id number]        This report has been produced using speech recognition.     Original Interpreting Physician:   BLANCA BEASLEY MD  Original Transcribed by/Date: PSCB   Mar 15 2022  3:30P  Original Electronically Signed by/Date: BLANCA BEASLEY MD Mar 15 2022  3:30P     Assessment/Plan   Problem List Items Addressed This Visit             ICD-10-CM    Hyperparathyroidism (Multi) E21.3    Vitamin B 12 deficiency E53.8    Vitamin D deficiency E55.9    History of gastric bypass Z98.84    Dysphagia R13.10    Intestinal malabsorption (HHS-HCC) - Primary K90.9    Hiatal hernia K44.9       Heather does not want surgery to address her hiatal hernia recurrence.  She would like to see Samuel to see if a GLP-1 would help with her weight gain and type 2 diabetes management.  She was again reminded to take the calcium citrate 500-600mg tid and not once daily.  She will obtain repeat labwork in three months to see if vitamin D comes up and PTH goes down.         Kevin Walker MD

## 2024-08-27 PROBLEM — K44.9 HIATAL HERNIA: Status: ACTIVE | Noted: 2024-08-27

## 2024-08-28 ENCOUNTER — TELEPHONE (OUTPATIENT)
Dept: PRIMARY CARE | Facility: CLINIC | Age: 74
End: 2024-08-28

## 2024-08-28 ENCOUNTER — TELEPHONE (OUTPATIENT)
Dept: SURGERY | Facility: CLINIC | Age: 74
End: 2024-08-28

## 2024-08-28 ENCOUNTER — APPOINTMENT (OUTPATIENT)
Dept: SURGERY | Facility: CLINIC | Age: 74
End: 2024-08-28
Payer: MEDICARE

## 2024-08-28 NOTE — TELEPHONE ENCOUNTER
Pt states Dr Molina will not perform left lower eyelid procedure because pt is anemic.  States that office needs copy of lab indicating she is no longer anemic.  Please review labs and advise.  Ph: [phone]

## 2024-08-29 ENCOUNTER — APPOINTMENT (OUTPATIENT)
Dept: INFUSION THERAPY | Facility: CLINIC | Age: 74
End: 2024-08-29
Payer: MEDICARE

## 2024-08-29 VITALS
WEIGHT: 156.1 LBS | HEART RATE: 86 BPM | TEMPERATURE: 98.3 F | BODY MASS INDEX: 28.32 KG/M2 | OXYGEN SATURATION: 96 % | RESPIRATION RATE: 12 BRPM | DIASTOLIC BLOOD PRESSURE: 86 MMHG | SYSTOLIC BLOOD PRESSURE: 139 MMHG

## 2024-08-29 DIAGNOSIS — M06.09 RHEUMATOID ARTHRITIS OF MULTIPLE SITES WITHOUT RHEUMATOID FACTOR (MULTI): ICD-10-CM

## 2024-08-29 PROCEDURE — 96372 THER/PROPH/DIAG INJ SC/IM: CPT | Performed by: NURSE PRACTITIONER

## 2024-08-29 RX ORDER — DIPHENHYDRAMINE HYDROCHLORIDE 50 MG/ML
50 INJECTION INTRAMUSCULAR; INTRAVENOUS AS NEEDED
OUTPATIENT
Start: 2024-09-26

## 2024-08-29 RX ORDER — ALBUTEROL SULFATE 0.83 MG/ML
3 SOLUTION RESPIRATORY (INHALATION) AS NEEDED
OUTPATIENT
Start: 2024-09-26

## 2024-08-29 RX ORDER — FAMOTIDINE 10 MG/ML
20 INJECTION INTRAVENOUS ONCE AS NEEDED
OUTPATIENT
Start: 2024-09-26

## 2024-08-29 RX ORDER — EPINEPHRINE 0.3 MG/.3ML
0.3 INJECTION SUBCUTANEOUS EVERY 5 MIN PRN
OUTPATIENT
Start: 2024-09-26

## 2024-08-29 ASSESSMENT — ENCOUNTER SYMPTOMS
MYALGIAS: 1
FATIGUE: 1
ARTHRALGIAS: 1

## 2024-08-29 NOTE — PROGRESS NOTES
Subjective   Patient ID: Heather Thurston is a 74 y.o. female who presents for No chief complaint on file..  HPI  FUV MEDICATION  Review of Systems    Objective   Physical Exam    Assessment/Plan            Blanche Ware LPN 08/29/24 9:59 AM

## 2024-08-29 NOTE — PATIENT INSTRUCTIONS
Today :We administered certolizumab pegol.     For:   1. Rheumatoid arthritis of multiple sites without rheumatoid factor (Multi)         Your next appointment is due in:  9/26/2024        Please read the  Medication Guide that was given to you and reviewed during todays visit.     (Tell all doctors including dentists that you are taking this medication)     Go to the emergency room or call 911 if:  -You have signs of allergic reaction:   -Rash, hives, itching.   -Swollen, blistered, peeling skin.   -Swelling of face, lips, mouth, tongue or throat.   -Tightness of chest, trouble breathing, swallowing or talking     Call your doctor:  - If IV / injection site gets red, warm, swollen, itchy or leaks fluid or pus.     (Leave dressing on your IV site for at least 2 hours and keep area clean and dry  - If you get sick or have symptoms of infection or are not feeling well for any reason.    (Wash your hands often, stay away from people who are sick)  - If you have side effects from your medication that do not go away or are bothersome.     (Refer to the teaching your nurse gave you for side effects to call your doctor about)    - Common side effects may include:  stuffy nose, headache, feeling tired, muscle aches, upset stomach  - Before receiving any vaccines     - Call the [facility] at   If:  - You get sick, are on antibiotics, have had a recent vaccine, have surgery or dental work and your doctor wants your visit rescheduled.  - You need to cancel and reschedule your visit for any reason. Call at least 2 days before your visit if you need to cancel.   - Your insurance changes before your next visit.    (We will need to get approval from your new insurance. This can take up to two weeks.)     The [facility] is opened Monday thru Friday. We are closed on weekends and holidays.   Voice mail will take your call if the center is closed. If you leave a message please allow 24 hours for a  call back during weekdays. If you leave a message on a weekend/holiday, we will call you back the next business day.

## 2024-08-29 NOTE — PROGRESS NOTES
[facility]   Infusion Clinic Note   Date: 2024   Name: Heather Thurston  : 1950   MRN: [id number]          Reason for Visit: OP Infusion (Cimzia)         Today: We administered certolizumab pegol.       Visit Type: INJECTION- Maintenance- Every 28 days       Ordered By: Dr. Chowdhury       Accompanied by:Self       Diagnosis: Rheumatoid arthritis of multiple sites without rheumatoid factor (Multi)        Allergies:   Allergies as of 2024 - Reviewed 2024   Allergen Reaction Noted    Atropine Anaphylaxis, Nausea And Vomiting, Palpitations, and Rash 2023    Codeine Itching and Unknown 2023    Ezetimibe Unknown 2023    Iodinated contrast media Other and Syncope 2023    Iodine Nausea/vomiting 10/16/2019    Lisinopril Cough 2023    Metformin hcl Diarrhea 2023    Simvastatin Unknown 2023    Adhesive tape-silicones Rash 2023          Current Medications has a current medication list which includes the following prescription(s): acetaminophen, amitriptyline, ascorbic acid, calcium carbonate, calcium citrate, cimzia, cholecalciferol, cyanocobalamin, dapagliflozin propanediol, prolia, dicyclomine, erythromycin, gabapentin, glimepiride, lansoprazole, leflunomide, multivit-minerals/folic acid, omeprazole, oxybutynin, sertraline, sucralfate, sulfasalazine, and tramadol.       Vitals:   Vitals:    24 1201   BP: 139/86   Pulse: 86   Resp: 12   Temp: 36.8 °C (98.3 °F)   TempSrc: Temporal   SpO2: 96%   Weight: 70.8 kg (156 lb 1.6 oz)             Infusion Pre-procedure Checklist:   - Allergies reviewed: yes   - Medications reviewed: yes       - Previous reaction to current treatment: no      Assess patient for the concerns below. Document provider notification as appropriate.  - Active or recent infection with/without current antibiotic use: no  - Recent or planned invasive dental work: no  - Recent or planned surgeries: no  -  "Recently received or plans to receive vaccinations: no  - Has treatment related toxicities: no  - Is pregnant:  n/a      Pain: 7   - Is the pain different from normal: no   - Is the pain tolerable: yes   - Is your Doctor aware:  yes       Labs:  reviewed          Fall Risk Screening: Derik Fall Risk  History of Falling, Immediate or Within 3 Months: No  Secondary Diagnosis: Yes  Ambulatory Aid: Walks without aid/bedrest/nurse assist  Intravenous Therapy/Heparin Lock: No  Gait/Transferring: Normal/bedrest/immobile  Mental Status: Oriented to own ability  More Fall Risk Score: 15       Review Of Systems:  Review of Systems   Constitutional:  Positive for fatigue.   Musculoskeletal:  Positive for arthralgias and myalgias.        Chronic \" spine\" pain         ROS completed? Yes      Infusion Readiness:  - Assessment Concerns Related to Infusion: No  - Provider notified: no      Document Below Only If Indicated:   New Patient Education:    N/A (returning patient for continuation of therapy. Ongoing education provided as needed.)        Treatment Conditions & Drug Specific Questions:    Certolizumab Pegol  (CIMZIA)    (Unless otherwise specified on patient specific therapy plan):     TREATMENT CONDITIONS:  Unless otherwise specified on patient specific therapy plan HOLD treatment and notify provider prior to proceeding with today's infusion if patient with a:  o Positive Hepatitis B Surface Ag  o Positive T-Spot  o NO baseline CBC-D obtained  o NO baseline CMP obtained    No results found for: \"HAGCN\", \"HEPBSURABI\", \"HBSAG\", \"XHAGF\", \"HEPBSAG\", \"EXTHEPBSAG\", \"NONUHSWGH\"   No results found for: \"NONUHFIRE\", \"NONUHSWGH\", \"NONUHFISH\", \"EXTHEPBSAG\"  Lab Results   Component Value Date    TBSIN Negative 07/30/2024      Lab Results   Component Value Date    WBC 5.5 08/19/2024    HGB 11.8 (L) 08/19/2024    HCT 39.4 08/19/2024    MCV 86 08/19/2024     08/19/2024        Chemistry    Lab Results   Component Value Date/Time "     08/19/2024 1310    K 4.3 08/19/2024 1310     08/19/2024 1310    CO2 28 08/19/2024 1310    BUN 11 08/19/2024 1310    CREATININE 0.80 08/19/2024 1310    Lab Results   Component Value Date/Time    CALCIUM 9.7 08/19/2024 1310    ALKPHOS 46 08/19/2024 1310    AST 18 08/19/2024 1310    ALT 9 08/19/2024 1310    BILITOT 0.2 08/19/2024 1310             Labs reviewed and patient meets treatment conditions? Yes    DRUG SPECIFIC QUESTIONS:  - Latex Allergy? No     (Caution: Prefilled syringe may include Latex)    - Any new or worsening signs / symptoms of heart failure which may include things like worsening shortness of breath, swelling, fatigue? No   (If yes notify provider before proceeding with today's infusion. New onset or worsening HF have been reported with infliximab)      REMINDERS:  Recommended Vitals/Observation:  Vitals: Obtain vitals prior to injection.  Observation: Patient may leave immediately following injection.        Weight Based Drug Calculations:    WEIGHT BASED DRUGS: NOT APPLICABLE / FLAT DOSE      1220 Patient tolerated injection well.  RTC on 9/26/2024.        Initiated By: Deja Pollack RN

## 2024-08-30 NOTE — TELEPHONE ENCOUNTER
LVM for patient to call office and schedule a surgical clearance appt. For Dr. Molina. This is per Osvaldo. Thank you!

## 2024-09-04 ENCOUNTER — APPOINTMENT (OUTPATIENT)
Dept: SURGERY | Facility: CLINIC | Age: 74
End: 2024-09-04
Payer: MEDICARE

## 2024-09-04 VITALS
BODY MASS INDEX: 28.34 KG/M2 | WEIGHT: 154 LBS | HEIGHT: 62 IN | SYSTOLIC BLOOD PRESSURE: 135 MMHG | HEART RATE: 89 BPM | DIASTOLIC BLOOD PRESSURE: 79 MMHG

## 2024-09-04 DIAGNOSIS — K90.9 INTESTINAL MALABSORPTION, UNSPECIFIED TYPE (HHS-HCC): Primary | ICD-10-CM

## 2024-09-04 DIAGNOSIS — K44.9 DIAPHRAGMATIC HERNIA WITHOUT OBSTRUCTION OR GANGRENE: ICD-10-CM

## 2024-09-04 DIAGNOSIS — Z98.84 HISTORY OF GASTRIC BYPASS: ICD-10-CM

## 2024-09-04 DIAGNOSIS — R10.13 EPIGASTRIC PAIN SYNDROME: ICD-10-CM

## 2024-09-04 DIAGNOSIS — K21.9 GASTROESOPHAGEAL REFLUX DISEASE WITHOUT ESOPHAGITIS: ICD-10-CM

## 2024-09-04 DIAGNOSIS — E11.9 TYPE 2 DIABETES MELLITUS WITHOUT COMPLICATION, UNSPECIFIED WHETHER LONG TERM INSULIN USE (MULTI): ICD-10-CM

## 2024-09-04 PROCEDURE — 3008F BODY MASS INDEX DOCD: CPT

## 2024-09-04 PROCEDURE — 3044F HG A1C LEVEL LT 7.0%: CPT

## 2024-09-04 PROCEDURE — 1036F TOBACCO NON-USER: CPT

## 2024-09-04 PROCEDURE — 1125F AMNT PAIN NOTED PAIN PRSNT: CPT

## 2024-09-04 PROCEDURE — 3075F SYST BP GE 130 - 139MM HG: CPT

## 2024-09-04 PROCEDURE — 1159F MED LIST DOCD IN RCRD: CPT

## 2024-09-04 PROCEDURE — 3049F LDL-C 100-129 MG/DL: CPT

## 2024-09-04 PROCEDURE — 99213 OFFICE O/P EST LOW 20 MIN: CPT

## 2024-09-04 PROCEDURE — 3078F DIAST BP <80 MM HG: CPT

## 2024-09-04 RX ORDER — SEMAGLUTIDE 0.68 MG/ML
0.25 INJECTION, SOLUTION SUBCUTANEOUS
Qty: 3 ML | Refills: 0 | Status: SHIPPED | OUTPATIENT
Start: 2024-09-04

## 2024-09-04 RX ORDER — DULAGLUTIDE 0.75 MG/.5ML
0.75 INJECTION, SOLUTION SUBCUTANEOUS WEEKLY
Qty: 2 ML | Refills: 11 | Status: SHIPPED | OUTPATIENT
Start: 2024-09-04 | End: 2024-09-04

## 2024-09-04 RX ORDER — DULAGLUTIDE 0.75 MG/.5ML
0.75 INJECTION, SOLUTION SUBCUTANEOUS WEEKLY
Qty: 2 ML | Refills: 1 | Status: SHIPPED | OUTPATIENT
Start: 2024-09-04

## 2024-09-04 ASSESSMENT — PAIN SCALES - GENERAL: PAINLEVEL: 6

## 2024-09-04 NOTE — PROGRESS NOTES
Subjective   Patient ID: Heather Thurston is a 74 y.o. female who presents for No chief complaint on file..  HPI  Heather tells me that in the last year in a half she has gained about 20 pounds. She tells me that she is having epigastric burning that she has always had. She is taking omeprazole BID for this. She tells me that she usually eats pretzels to help with the burning. She tells me that she not currently taking Farxiga. She currently only takes Amaryl to manage her diabetes. She would like to start medication for weight loss.     Objective   Physical Exam  Constitutional:       Appearance: Normal appearance.   Eyes:      Pupils: Pupils are equal, round, and reactive to light.   Cardiovascular:      Rate and Rhythm: Normal rate.   Pulmonary:      Effort: Pulmonary effort is normal.   Abdominal:      Palpations: Abdomen is soft.   Musculoskeletal:         General: Normal range of motion.   Skin:     General: Skin is warm and dry.   Neurological:      General: No focal deficit present.      Mental Status: She is alert and oriented to person, place, and time.   Psychiatric:         Mood and Affect: Mood normal.         Assessment/Plan   Problem List Items Addressed This Visit             ICD-10-CM    Gastroesophageal reflux disease K21.9    Type 2 diabetes mellitus without complication (Multi) E11.9    Relevant Medications    semaglutide (Ozempic) 0.25 mg or 0.5 mg (2 mg/3 mL) pen injector    Epigastric pain syndrome R10.13    History of gastric bypass Z98.84    Diaphragmatic hernia without obstruction or gangrene K44.9    Intestinal malabsorption ([facility]) - Primary K90.9     Heather and I discussed that I was unsure if her insurance would cover GLP-1 medications. We reviewed common side effects and how to take the medication. We discussed that I do not manage T2DM, so I did not have a demo pen to show her how to take the medication. We briefly reviewed online, but I recommended that she go to the medication  website and watch the [employer] videos. She tells me that she would be able to do that. We also discussed asking the pharmacist. We discussed that she would need to stop grazing throughout the day in order to see weight loss with any medication due to the fact that she is grazing as a relief for pain, not hunger. We discussed that injectable medication may make her heartburn/epigastric pain worse or giver her reflux. We also discussed that it can cause her to vomit if she overeats or continues to graze. She states understanding.   We also discussed I would start her on this medication, but that as long as she is tolerating side effects, that she would go back to her PCP to monitor her A1C as we are starting for weight loss, not for diabetes.     ROD Middleton-CNP 09/04/24 2:25 PM

## 2024-09-06 ENCOUNTER — OFFICE VISIT (OUTPATIENT)
Dept: PRIMARY CARE | Facility: CLINIC | Age: 74
End: 2024-09-06
Payer: MEDICARE

## 2024-09-06 ENCOUNTER — LAB (OUTPATIENT)
Dept: LAB | Facility: LAB | Age: 74
End: 2024-09-06
Payer: MEDICARE

## 2024-09-06 VITALS
WEIGHT: 154 LBS | HEART RATE: 79 BPM | OXYGEN SATURATION: 96 % | TEMPERATURE: 97.8 F | SYSTOLIC BLOOD PRESSURE: 120 MMHG | DIASTOLIC BLOOD PRESSURE: 70 MMHG | HEIGHT: 62 IN | BODY MASS INDEX: 28.34 KG/M2

## 2024-09-06 DIAGNOSIS — Z01.818 PREOP EXAM FOR INTERNAL MEDICINE: Primary | ICD-10-CM

## 2024-09-06 DIAGNOSIS — D64.9 ANEMIA, UNSPECIFIED TYPE: ICD-10-CM

## 2024-09-06 LAB
ERYTHROCYTE [DISTWIDTH] IN BLOOD BY AUTOMATED COUNT: 23.4 % (ref 11.5–14.5)
HCT VFR BLD AUTO: 44.6 % (ref 36–46)
HGB BLD-MCNC: 13.5 G/DL (ref 12–16)
MCH RBC QN AUTO: 26.8 PG (ref 26–34)
MCHC RBC AUTO-ENTMCNC: 30.3 G/DL (ref 32–36)
MCV RBC AUTO: 89 FL (ref 80–100)
NRBC BLD-RTO: 0 /100 WBCS (ref 0–0)
PLATELET # BLD AUTO: 248 X10*3/UL (ref 150–450)
RBC # BLD AUTO: 5.04 X10*6/UL (ref 4–5.2)
WBC # BLD AUTO: 5.1 X10*3/UL (ref 4.4–11.3)

## 2024-09-06 PROCEDURE — 3008F BODY MASS INDEX DOCD: CPT | Performed by: LICENSED PRACTICAL NURSE

## 2024-09-06 PROCEDURE — 1159F MED LIST DOCD IN RCRD: CPT | Performed by: LICENSED PRACTICAL NURSE

## 2024-09-06 PROCEDURE — 36415 COLL VENOUS BLD VENIPUNCTURE: CPT

## 2024-09-06 PROCEDURE — 3074F SYST BP LT 130 MM HG: CPT | Performed by: LICENSED PRACTICAL NURSE

## 2024-09-06 PROCEDURE — 85027 COMPLETE CBC AUTOMATED: CPT

## 2024-09-06 PROCEDURE — 3044F HG A1C LEVEL LT 7.0%: CPT | Performed by: LICENSED PRACTICAL NURSE

## 2024-09-06 PROCEDURE — 1125F AMNT PAIN NOTED PAIN PRSNT: CPT | Performed by: LICENSED PRACTICAL NURSE

## 2024-09-06 PROCEDURE — 3078F DIAST BP <80 MM HG: CPT | Performed by: LICENSED PRACTICAL NURSE

## 2024-09-06 PROCEDURE — 3049F LDL-C 100-129 MG/DL: CPT | Performed by: LICENSED PRACTICAL NURSE

## 2024-09-06 PROCEDURE — 1036F TOBACCO NON-USER: CPT | Performed by: LICENSED PRACTICAL NURSE

## 2024-09-06 PROCEDURE — 99213 OFFICE O/P EST LOW 20 MIN: CPT | Performed by: LICENSED PRACTICAL NURSE

## 2024-09-06 ASSESSMENT — PAIN SCALES - GENERAL: PAINLEVEL: 6

## 2024-09-06 NOTE — PROGRESS NOTES
[facility]: [facility]  PROGRESS NOTE      Heather Thurston is a 74 y.o. female that is presenting today for Follow-up.      Subjective   Pt presents to the office today for concerns of for surgical clearance.  Ms. Thurston has a PMH of fibromyalgia, DM, HTN, B12 deficiency, GERD, and malabsorption. She is scheduled for a Entropion Repair- Tarsal Wedge Left Lower Eyelid with Dr. Molina on 9/19/24 at the [facility].    She is not required to have any preadmission testing.   Ms. Thurston was last seen in our office on 8/14/24 for follow up on her anemia with noted improvement of her hemoglobin.  She was last seen by Dr. Douglas. 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 She will use PPI and or tums as necessary. There was also noted plan for start on Ozempic at her 9/4/24 follow up visit to his office regarding her malabsorption diagnosis related to weight gain concerns. Aside from this she shares that her chronic medical conditions are stable and she has no other health concerns.     Ms. Thurston shares that she is able to walk up a flight of stairs and complete household chores without respiratory distress.             Review of Systems   All other systems reviewed and are negative.     Objective   Vitals:    09/06/24 0943   BP: 120/70   Pulse: 79   Temp: 36.6 °C (97.8 °F)   SpO2: 96%      Body mass index is 27.94 kg/m².  Physical Exam  Vitals reviewed.   Constitutional:       General: She is not in acute distress.     Appearance: Normal appearance. She is not ill-appearing, toxic-appearing or diaphoretic.   Neck:      Vascular: No carotid bruit.   Cardiovascular:      Rate and Rhythm: Normal rate and regular rhythm.      Heart sounds: Normal heart sounds, S1 normal and S2 normal.   Pulmonary:      Effort: Pulmonary effort is normal. No respiratory distress.      Breath sounds: Normal  "breath sounds. No stridor. No decreased breath sounds, wheezing, rhonchi or rales.   Musculoskeletal:      Right lower leg: No edema.      Left lower leg: No edema.   Skin:     General: Skin is warm.   Neurological:      Mental Status: She is alert.       Diagnostic Results   Lab Results   Component Value Date    GLUCOSE 105 (H) 08/19/2024    CALCIUM 9.7 08/19/2024     08/19/2024    K 4.3 08/19/2024    CO2 28 08/19/2024     08/19/2024    BUN 11 08/19/2024    CREATININE 0.80 08/19/2024     Lab Results   Component Value Date    ALT 9 08/19/2024    AST 18 08/19/2024    ALKPHOS 46 08/19/2024    BILITOT 0.2 08/19/2024     Lab Results   Component Value Date    WBC 5.5 08/19/2024    HGB 11.8 (L) 08/19/2024    HCT 39.4 08/19/2024    MCV 86 08/19/2024     08/19/2024     Lab Results   Component Value Date    CHOL 184 07/08/2024    CHOL 164 07/21/2022    CHOL 150 07/22/2021     Lab Results   Component Value Date    HDL 59.0 07/08/2024    HDL 59 07/21/2022    HDL 63 07/22/2021     Lab Results   Component Value Date    LDLCALC 101 07/08/2024    LDLCALC 91 07/21/2022    LDLCALC 74 07/22/2021     Lab Results   Component Value Date    TRIG 118 07/08/2024    TRIG 70 07/21/2022    TRIG 66 07/22/2021     No components found for: \"CHOLHDL\"  Lab Results   Component Value Date    HGBA1C 6.8 (H) 07/08/2024     Other labs not included in the list above were reviewed either before or during this encounter.    History    Past Medical History:   Diagnosis Date    Adrenal mass     '05 to '14 no change by CT    Anxiety 08/26/2023    Chronic arthritis     tramadol prn    COVID     CVA (cerebral vascular accident)     DJD (degenerative joint disease)     Epigastric pain 11/22/2022    Fibromyalgia     GERD (gastroesophageal reflux disease)     Gout     HLD (hyperlipidemia)     HTN (hypertension)     htn ecg nsr    Intestinal malabsorption (HHS-HCC) 08/26/2023    Osteoporosis     DEXA OSTEOPOROSIS pROLIA    Overactive bladder " 08/26/2023    Personal history of other diseases of the musculoskeletal system and connective tissue     History of rheumatoid arthritis    Plantar fasciitis of left foot     Rheumatoid arthritis of multiple sites without rheumatoid factor (Multi) 11/08/2023    Sinusitis 02/29/2024    Thyroid nodule     10/22  ENT  eval neg.     Past Surgical History:   Procedure Laterality Date    EYE SURGERY      LOWER EYELID, LID RAISE    LAPAROSCOPIC GASTRIC BANDING  05/21/2007    LAPAROSCOPIC ADJUSTABLE GASTRIC BAND UTILIZING 11 CM VANGUARD BAND, ENTEROLYSIS (ELISE-NATIVIDAD)    OTHER SURGICAL HISTORY  12/19/2022    Knee replacement    OTHER SURGICAL HISTORY  12/19/2022    Carpal tunnel surgery    OTHER SURGICAL HISTORY  12/19/2022    Cholecystectomy    OTHER SURGICAL HISTORY  12/19/2022    Hysterectomy    TOTAL KNEE ARTHROPLASTY      LEFT TOTAL KNEE ARTHROPLASTY (DR. DOUGLAS)    TOTAL KNEE ARTHROPLASTY  2015    RT TKA (DR. HARRIS)     Family History   Problem Relation Name Age of Onset    Lung cancer Mother      Liver cancer Father      Diabetes Father      Hypertension Father      Hypertension Brother      Obesity Brother      Diabetes Brother      Obesity Daughter      Hypertension Daughter      Diabetes Daughter       Social History     Socioeconomic History    Marital status:      Spouse name: Not on file    Number of children: Not on file    Years of education: Not on file    Highest education level: Not on file   Occupational History    Not on file   Tobacco Use    Smoking status: Never     Passive exposure: Never    Smokeless tobacco: Never   Vaping Use    Vaping status: Never Used   Substance and Sexual Activity    Alcohol use: Never    Drug use: Never    Sexual activity: Not on file   Other Topics Concern    Not on file   Social History Narrative    Not on file     Social Determinants of Health     Financial Resource Strain: Not on file   Food Insecurity: Not on file   Transportation Needs: Not on file    Physical Activity: Not on file   Stress: Not on file   Social Connections: Not on file   Intimate Partner Violence: Not At Risk (3/21/2024)    Humiliation, Afraid, Rape, and Kick questionnaire     Fear of Current or Ex-Partner: No     Emotionally Abused: No     Physically Abused: No     Sexually Abused: No   Housing Stability: Not on file     Allergies   Allergen Reactions    Atropine Anaphylaxis, Nausea And Vomiting, Palpitations and Rash     HEART RACING    Codeine Itching and Unknown    Ezetimibe Unknown    Iodinated Contrast Media Other and Syncope     SYNCOPE    NAUSEA    VOMITING    Iodine Nausea/vomiting    Lisinopril Cough    Metformin Hcl Diarrhea    Simvastatin Unknown    Adhesive Tape-Silicones Rash     Current Outpatient Medications on File Prior to Visit   Medication Sig Dispense Refill    acetaminophen (Tylenol 8 HOUR) 650 mg ER tablet Take 1 tablet (650 mg) by mouth every 8 hours if needed for mild pain (1 - 3). Do not crush, chew, or split.  Tylenol arthritis takes 2 a day      amitriptyline (Elavil) 10 mg tablet Take 2 tablets (20 mg) by mouth once daily at bedtime.      ascorbic acid (Vitamin C) 250 MG chewable tablet Chew 1 tablet (250 mg). Gummies dose unknown      calcium carbonate (Tums) 200 mg calcium chewable tablet Chew 1 tablet (500 mg) once daily.      CALCIUM CITRATE ORAL Take 500 mg by mouth 2 times a day.      certolizumab pegol (Cimzia) injection Inject 2 mL (400 mg) under the skin every 28 (twenty-eight) days. 2 mL 11    cholecalciferol (Vitamin D3) 1,250 mcg (50,000 unit) tablet Take 1 capsule by mouth 1 (one) time per week.      cyanocobalamin (Vitamin B-12) 1,000 mcg/mL injection Inject into the shoulder, thigh, or buttocks every 30 (thirty) days.      dapagliflozin propanediol (Farxiga) 10 mg Take 1 tablet (10 mg) by mouth early in the morning..      denosumab (Prolia) 60 mg/mL syringe Inject 1 mL (60 mg total) under the skin every 6 months.  As directed 1 mL 1    dicyclomine  (Bentyl) 10 mg capsule Take 1 capsule (10 mg) by mouth 3 times a day.      dulaglutide (Trulicity) 0.75 mg/0.5 mL pen injector Inject 0.75 mg under the skin 1 (one) time per week. 2 mL 1    erythromycin (Romycin) 5 mg/gram (0.5 %) ophthalmic ointment APPLY 1/2 INCH ON EYELID THREE TIMES DAILY FOR USE AFTER SURGERY      gabapentin (Neurontin) 300 mg capsule Take 1 capsule (300 mg) by mouth 3 times a day. 270 capsule 1    glimepiride (Amaryl) 2 mg tablet Take 0.5 tablets (1 mg) by mouth once daily in the morning. Take before meals.      lansoprazole (Prevacid) 15 mg DR capsule Take 1 capsule (15 mg) by mouth once daily in the morning. Take before meals. Do not crush or chew.      leflunomide (Arava) 20 mg tablet TAKE 1 TABLET(20 MG) BY MOUTH DAILY 90 tablet 1    multivit-minerals/folic acid (ADULT ONE DAILY MULTIVITAMIN ORAL) Take by mouth.      omeprazole (PriLOSEC) 40 mg DR capsule Take 1 capsule (40 mg) by mouth 2 times a day before meals. 180 capsule 1    oxybutynin (Ditropan) 5 mg tablet Take 1 tablet (5 mg) by mouth once daily. 90 tablet 2    semaglutide (Ozempic) 0.25 mg or 0.5 mg (2 mg/3 mL) pen injector Inject 0.25 mg under the skin every 7 days. 3 mL 0    sertraline (Zoloft) 50 mg tablet Take 1 tablet (50 mg) by mouth once daily. 90 tablet 3    sucralfate (Carafate) 1 gram tablet Take 1 tablet (1 g) by mouth. ON AN EMPTY STOMACH, CRUSH 3 TIMES DAILY      sulfaSALAzine (Azulfidine) 500 mg tablet Take 1 tablet (500 mg) by mouth 2 times a day.      traMADol (Ultram) 50 mg tablet Take 1 tablet (50 mg) by mouth once daily as needed for moderate pain (4 - 6) (OA/RA). 90 tablet 0    [DISCONTINUED] dulaglutide (Trulicity) 0.75 mg/0.5 mL pen injector Inject 0.75 mg under the skin 1 (one) time per week. 2 mL 11     No current facility-administered medications on file prior to visit.     Immunization History   Administered Date(s) Administered    Flu vaccine, quadrivalent, high-dose, preservative free, age 65y+  (FLUZONE) 10/21/2020, 10/21/2021, 12/05/2023    Flu vaccine, trivalent, preservative free, HIGH-DOSE, age 65y+ (Fluzone) 10/07/2016, 11/14/2018, 01/08/2020    Hepatitis B vaccine, adult *Check Product/Dose* 10/11/2005, 12/13/2005, 08/25/2006    Influenza, Seasonal, Quadrivalent, Adjuvanted 10/18/2022    Influenza, injectable, quadrivalent 11/14/2017    Influenza, seasonal, injectable 11/01/2008, 01/04/2011, 10/19/2011, 10/01/2014    Influenza, trivalent, adjuvanted 10/05/2020, 10/20/2020, 10/22/2021    Novel influenza-H1N1-09, preservative-free 01/06/2010    Pfizer COVID-19 vaccine, bivalent, age 12 years and older (30 mcg/0.3 mL) 10/24/2022    Pfizer Purple Cap SARS-CoV-2 03/08/2021, 03/29/2021, 10/22/2021    Pneumococcal conjugate vaccine, 13-valent (PREVNAR 13) 09/01/2015    Pneumococcal conjugate vaccine, 20-valent (PREVNAR 20) 10/18/2022    Pneumococcal polysaccharide vaccine, 23-valent, age 2 years and older (PNEUMOVAX 23) 01/01/2002, 01/04/2011    Tdap vaccine, age 7 year and older (BOOSTRIX, ADACEL) 01/08/2020    Zoster vaccine, recombinant, adult (SHINGRIX) 11/01/2022    Zoster, live 09/07/2012     Patient's medical history was reviewed and updated either before or during this encounter.       Assessment/Plan   Problem List Items Addressed This Visit       Anemia    Relevant Orders    CBC    Preop exam for internal medicine - Primary     METS >4    Patient is here today for preoperative evaluation.  - Revised Cardiac Risk Index: Class II Risk (6.0% risk of intraoperative or 30-day cardiovascular event).    I will order a repeat CBC to follow up on her anemia.   Preoperative Risk: Patient needs labwork and/or imaging ordered. Assuming no major abnormalities on orders listed above, patient is medically optimized for surgery.      Osvaldo Rajput, APRN-CNP

## 2024-09-09 ENCOUNTER — TELEPHONE (OUTPATIENT)
Dept: PRIMARY CARE | Facility: CLINIC | Age: 74
End: 2024-09-09
Payer: MEDICARE

## 2024-09-09 NOTE — TELEPHONE ENCOUNTER
Pt states trulicity costs $185/month and asking for a more affordable alternative.  Please advise.  Ph: [phone]

## 2024-09-09 NOTE — PROGRESS NOTES
HPI   75 yo hx of dm2, htn, dyslipidemia, RA, MATIAS, osteoporosis, s/p juancho en Y bypass Oct '18. Here for yearly thyroid follow up.          Thyroid:    In 2020 thyroid nodule found incidentally when having an MRI of the neck.         -Thyroid u/s revealed in aug 4, 2020: 1.5cm nodule in R lobe with peripheral rim calcification, L 0.5cm complex nodule.         -Pt had FNA of dominant nodule in Sept 2021 with insufficient sample. Gave the pt option of re-biospy vs serial u/s.         Pt had stable thyroid u/s july 2021, R 1.1cm(+calcified) TR4.         -thyroid u/s July 2022: R 1.7cm nodule(+calcification) TR4, L 6mm nodule (several other sub cm nodules on scan)    Sept 2023 R 1.8 cm nodule, tr5, R 0.3cm cyst, L 0.3cm nodule tr3, L 0.5cm nodule tr3  -pt saw Dr. Valverde dec 2022 and recommended following, no repeat fna  -pt had trial of synthroid 50mcg qd and has felt no difference in sx and came off          Since last visit:   Pt still with occ fullness in neck, no swollen ln's in neck, some changes in voice.   -still with wt change, gi upset (continues working with bariatric surgery)      Current Outpatient Medications:     acetaminophen (Tylenol 8 HOUR) 650 mg ER tablet, Take 1 tablet (650 mg) by mouth every 8 hours if needed for mild pain (1 - 3). Do not crush, chew, or split. Tylenol arthritis takes 2 a day, Disp: , Rfl:     amitriptyline (Elavil) 10 mg tablet, Take 2 tablets (20 mg) by mouth once daily at bedtime., Disp: , Rfl:     ascorbic acid (Vitamin C) 250 MG chewable tablet, Chew 1 tablet (250 mg). Gummies dose unknown, Disp: , Rfl:     calcium carbonate (Tums) 200 mg calcium chewable tablet, Chew 1 tablet (500 mg) once daily., Disp: , Rfl:     CALCIUM CITRATE ORAL, Take 500 mg by mouth 2 times a day., Disp: , Rfl:     certolizumab pegol (Cimzia) injection, Inject 2 mL (400 mg) under the skin every 28 (twenty-eight) days., Disp: 2 mL, Rfl: 11    cholecalciferol (Vitamin D3) 1,250 mcg (50,000 unit) tablet, Take  1 capsule by mouth 1 (one) time per week., Disp: , Rfl:     cyanocobalamin (Vitamin B-12) 1,000 mcg/mL injection, Inject into the shoulder, thigh, or buttocks every 30 (thirty) days., Disp: , Rfl:     dapagliflozin propanediol (Farxiga) 10 mg, Take 1 tablet (10 mg) by mouth early in the morning.., Disp: , Rfl:     denosumab (Prolia) 60 mg/mL syringe, Inject 1 mL (60 mg total) under the skin every 6 months.  As directed, Disp: 1 mL, Rfl: 1    dicyclomine (Bentyl) 10 mg capsule, Take 1 capsule (10 mg) by mouth 3 times a day., Disp: , Rfl:     dulaglutide (Trulicity) 0.75 mg/0.5 mL pen injector, Inject 0.75 mg under the skin 1 (one) time per week., Disp: 2 mL, Rfl: 1    erythromycin (Romycin) 5 mg/gram (0.5 %) ophthalmic ointment, APPLY 1/2 INCH ON EYELID THREE TIMES DAILY FOR USE AFTER SURGERY, Disp: , Rfl:     gabapentin (Neurontin) 300 mg capsule, Take 1 capsule (300 mg) by mouth 3 times a day., Disp: 270 capsule, Rfl: 1    glimepiride (Amaryl) 2 mg tablet, Take 0.5 tablets (1 mg) by mouth once daily in the morning. Take before meals., Disp: , Rfl:     lansoprazole (Prevacid) 15 mg DR capsule, Take 1 capsule (15 mg) by mouth once daily in the morning. Take before meals. Do not crush or chew., Disp: , Rfl:     leflunomide (Arava) 20 mg tablet, TAKE 1 TABLET(20 MG) BY MOUTH DAILY, Disp: 90 tablet, Rfl: 1    multivit-minerals/folic acid (ADULT ONE DAILY MULTIVITAMIN ORAL), Take by mouth., Disp: , Rfl:     omeprazole (PriLOSEC) 40 mg DR capsule, Take 1 capsule (40 mg) by mouth 2 times a day before meals., Disp: 180 capsule, Rfl: 1    oxybutynin (Ditropan) 5 mg tablet, TAKE 1 TABLET BY MOUTH ONCE  DAILY, Disp: 90 tablet, Rfl: 0    semaglutide (Ozempic) 0.25 mg or 0.5 mg (2 mg/3 mL) pen injector, Inject 0.25 mg under the skin every 7 days., Disp: 3 mL, Rfl: 0    sertraline (Zoloft) 50 mg tablet, Take 1 tablet (50 mg) by mouth once daily., Disp: 90 tablet, Rfl: 3    sucralfate (Carafate) 1 gram tablet, Take 1 tablet (1 g)  by mouth. ON AN EMPTY STOMACH, CRUSH 3 TIMES DAILY, Disp: , Rfl:     sulfaSALAzine (Azulfidine) 500 mg tablet, Take 1 tablet (500 mg) by mouth 2 times a day., Disp: , Rfl:     traMADol (Ultram) 50 mg tablet, Take 1 tablet (50 mg) by mouth once daily as needed for moderate pain (4 - 6) (OA/RA)., Disp: 90 tablet, Rfl: 0      Allergies as of 09/11/2024 - Reviewed 09/11/2024   Allergen Reaction Noted    Atropine Anaphylaxis, Nausea And Vomiting, Palpitations, and Rash 08/26/2023    Codeine Itching and Unknown 08/26/2023    Ezetimibe Unknown 08/26/2023    Iodinated contrast media Other and Syncope 08/26/2023    Iodine Nausea/vomiting 10/16/2019    Lisinopril Cough 08/26/2023    Metformin hcl Diarrhea 08/26/2023    Simvastatin Unknown 08/26/2023    Adhesive tape-silicones Rash 08/26/2023         Review of Systems   Cardiology: Lightheadedness-denies.  Chest pain-denies.  Leg edema-denies.  Palpitations-denies.  Respiratory: Cough-denies. Shortness of breath-denies.  Wheezing-denies.  Gastroenterology: Constipation-denies.  Diarrhea-denies.  Heartburn-denies.  Endocrinology: Cold intolerance-denies.  Heat intolerance-denies.  Sweats-denies.  Neurology: Headache-denies.  Tremor-denies.  Neuropathy in extremities-denies.  Psychology: Low energy-denies.  Irritability-denies.  Sleep disturbances-denies.      /77 (BP Location: Right arm, Patient Position: Sitting)   Pulse 82   Wt 71.7 kg (158 lb)   LMP  (LMP Unknown)   BMI 28.67 kg/m²       Labs:  Lab Results   Component Value Date    WBC 5.1 09/06/2024    NRBC 0.0 09/06/2024    RBC 5.04 09/06/2024    HGB 13.5 09/06/2024    HCT 44.6 09/06/2024     09/06/2024     Lab Results   Component Value Date    CALCIUM 9.7 08/19/2024    AST 18 08/19/2024    ALKPHOS 46 08/19/2024    BILITOT 0.2 08/19/2024    PROT 6.4 08/19/2024    ALBUMIN 4.3 08/19/2024    GLOB 2.7 07/21/2022    AGR 1.4 (L) 07/21/2022     08/19/2024    K 4.3 08/19/2024     08/19/2024    CO2 28  "08/19/2024    ANIONGAP 8 08/19/2024    BUN 11 08/19/2024    CREATININE 0.80 08/19/2024    UREACREAUR 13.8 05/23/2023    GLUCOSE 105 (H) 08/19/2024    ALT 9 08/19/2024    EGFR 77 08/19/2024     Lab Results   Component Value Date    CHOL 184 07/08/2024    TRIG 118 07/08/2024    HDL 59.0 07/08/2024    LDLCALC 101 07/08/2024     No results found for: \"MICROALBCREA\"  Lab Results   Component Value Date    TSH 2.40 07/08/2024     Lab Results   Component Value Date    SWWDLDYS98 1,404 (H) 07/08/2024     Lab Results   Component Value Date    HGBA1C 6.8 (H) 07/08/2024         Physical Exam   General Appearance: pleasant, cooperative, no acute distress  HEENT: no chemosis, no proptosis, no lid lag, no lid retraction  Neck: no lymphadenopathy, no thyromegaly, + r 1.5cm nodule on exam  Heart: no murmurs, regular rate and rhythm, S1 and S2  Lungs: no wheezes, no rhonci, no rales  Extremities: no lower extremity swelling      Assessment/Plan   1. Thyroid nodule  -recommend yearly tsh check with pcp yearly    -in regards to nodule can repeat thyroid ultrasound in fall 2028 and then prn  -have yearly neck exams and if any sx/abnormalities have thyroid ultrasound sooner  -can follow with pcp and see Endo prn for this as it has been stable    -pt asked about glp-1RA, would suggest ozempic/mounjaro cautiously watching for GI side effects  -could do this with  pt assistance program, pharmacist in your pcp office could facilitate this  -would avoid [facility] in this case if possible      Follow Up:  Praneeth prn    -labs/tests/notes reviewed  -reviewed and counseled patient on medication monitoring and side effects          "

## 2024-09-11 ENCOUNTER — APPOINTMENT (OUTPATIENT)
Dept: ENDOCRINOLOGY | Facility: CLINIC | Age: 74
End: 2024-09-11
Payer: MEDICARE

## 2024-09-11 VITALS
BODY MASS INDEX: 28.67 KG/M2 | DIASTOLIC BLOOD PRESSURE: 77 MMHG | SYSTOLIC BLOOD PRESSURE: 131 MMHG | HEART RATE: 82 BPM | WEIGHT: 158 LBS

## 2024-09-11 DIAGNOSIS — E11.65 TYPE 2 DIABETES MELLITUS WITH HYPERGLYCEMIA, WITHOUT LONG-TERM CURRENT USE OF INSULIN (MULTI): Primary | ICD-10-CM

## 2024-09-11 DIAGNOSIS — E04.1 THYROID NODULE: Primary | ICD-10-CM

## 2024-09-11 PROCEDURE — 99213 OFFICE O/P EST LOW 20 MIN: CPT | Performed by: INTERNAL MEDICINE

## 2024-09-11 PROCEDURE — 1036F TOBACCO NON-USER: CPT | Performed by: INTERNAL MEDICINE

## 2024-09-11 PROCEDURE — 3044F HG A1C LEVEL LT 7.0%: CPT | Performed by: INTERNAL MEDICINE

## 2024-09-11 PROCEDURE — 3078F DIAST BP <80 MM HG: CPT | Performed by: INTERNAL MEDICINE

## 2024-09-11 PROCEDURE — 3075F SYST BP GE 130 - 139MM HG: CPT | Performed by: INTERNAL MEDICINE

## 2024-09-11 PROCEDURE — 3049F LDL-C 100-129 MG/DL: CPT | Performed by: INTERNAL MEDICINE

## 2024-09-11 ASSESSMENT — PATIENT HEALTH QUESTIONNAIRE - PHQ9
SUM OF ALL RESPONSES TO PHQ9 QUESTIONS 1 & 2: 0
1. LITTLE INTEREST OR PLEASURE IN DOING THINGS: NOT AT ALL
2. FEELING DOWN, DEPRESSED OR HOPELESS: NOT AT ALL

## 2024-09-12 ENCOUNTER — TELEMEDICINE (OUTPATIENT)
Dept: PHARMACY | Facility: HOSPITAL | Age: 74
End: 2024-09-12
Payer: MEDICARE

## 2024-09-12 DIAGNOSIS — E11.9 TYPE 2 DIABETES MELLITUS WITHOUT COMPLICATION, UNSPECIFIED WHETHER LONG TERM INSULIN USE (MULTI): ICD-10-CM

## 2024-09-12 DIAGNOSIS — E11.65 TYPE 2 DIABETES MELLITUS WITH HYPERGLYCEMIA, WITHOUT LONG-TERM CURRENT USE OF INSULIN (MULTI): ICD-10-CM

## 2024-09-12 PROCEDURE — RXMED WILLOW AMBULATORY MEDICATION CHARGE

## 2024-09-12 RX ORDER — DULAGLUTIDE 0.75 MG/.5ML
0.75 INJECTION, SOLUTION SUBCUTANEOUS WEEKLY
Qty: 2 ML | Refills: 3 | Status: SHIPPED | OUTPATIENT
Start: 2024-09-12

## 2024-09-12 RX ORDER — FAMOTIDINE 10 MG/1
10 TABLET ORAL DAILY PRN
COMMUNITY

## 2024-09-12 NOTE — PROGRESS NOTES
MEDICATION MANAGEMENT    Heather Thurston is a 74 y.o. female who was referred by ANGELICA Montgomery to complete medication reconciliation and review with the clinical pharmacist.  A comprehensive medication review was completed with the patient via telephone today.  Patient reports financial barrier with current medication.      MEDICATION HISTORY  Allergies   Allergen Reactions    Atropine Anaphylaxis, Nausea And Vomiting, Palpitations and Rash     HEART RACING    Codeine Itching and Unknown    Ezetimibe Unknown    Iodinated Contrast Media Other and Syncope     SYNCOPE    NAUSEA    VOMITING    Iodine Nausea/vomiting    Lisinopril Cough    Metformin Hcl Diarrhea    Simvastatin Unknown    Adhesive Tape-Silicones Rash     Current Outpatient Medications on File Prior to Visit   Medication Sig Dispense Refill    acetaminophen (Tylenol 8 HOUR) 650 mg ER tablet Take 1 tablet (650 mg) by mouth every 8 hours if needed for mild pain (1 - 3). Do not crush, chew, or split.  Tylenol arthritis takes 2 a day      amitriptyline (Elavil) 10 mg tablet Take 2 tablets (20 mg) by mouth once daily at bedtime.      ascorbic acid (Vitamin C) 250 MG chewable tablet Chew 1 tablet (250 mg). Gummies dose unknown      calcium carbonate (Tums) 200 mg calcium chewable tablet Chew 1 tablet (500 mg) once daily.      CALCIUM CITRATE ORAL Take 500 mg by mouth 2 times a day.      certolizumab pegol (Cimzia) injection Inject 2 mL (400 mg) under the skin every 28 (twenty-eight) days. 2 mL 11    cholecalciferol (Vitamin D3) 1,250 mcg (50,000 unit) tablet Take 1 capsule by mouth 1 (one) time per week.      cyanocobalamin (Vitamin B-12) 1,000 mcg/mL injection Inject into the shoulder, thigh, or buttocks every 30 (thirty) days.      denosumab (Prolia) 60 mg/mL syringe Inject 1 mL (60 mg total) under the skin every 6 months.  As directed 1 mL 1    dicyclomine (Bentyl) 10 mg capsule Take 1 capsule (10 mg) by mouth 3 times a day.      dulaglutide  (Trulicity) 0.75 mg/0.5 mL pen injector Inject 0.75 mg under the skin 1 (one) time per week. 2 mL 1    erythromycin (Romycin) 5 mg/gram (0.5 %) ophthalmic ointment APPLY 1/2 INCH ON EYELID THREE TIMES DAILY FOR USE AFTER SURGERY      famotidine (Pepcid) 10 mg tablet Take 1 tablet (10 mg) by mouth once daily as needed for heartburn.      gabapentin (Neurontin) 300 mg capsule Take 1 capsule (300 mg) by mouth 3 times a day. 270 capsule 1    glimepiride (Amaryl) 2 mg tablet Take 0.5 tablets (1 mg) by mouth once daily in the morning. Take before meals.      leflunomide (Arava) 20 mg tablet TAKE 1 TABLET(20 MG) BY MOUTH DAILY 90 tablet 1    multivit-minerals/folic acid (ADULT ONE DAILY MULTIVITAMIN ORAL) Take by mouth.      omeprazole (PriLOSEC) 40 mg DR capsule Take 1 capsule (40 mg) by mouth 2 times a day before meals. 180 capsule 1    oxybutynin (Ditropan) 5 mg tablet TAKE 1 TABLET BY MOUTH ONCE  DAILY 90 tablet 0    sertraline (Zoloft) 50 mg tablet Take 1 tablet (50 mg) by mouth once daily. 90 tablet 3    sulfaSALAzine (Azulfidine) 500 mg tablet Take 1 tablet (500 mg) by mouth 2 times a day.      traMADol (Ultram) 50 mg tablet Take 1 tablet (50 mg) by mouth once daily as needed for moderate pain (4 - 6) (OA/RA). 90 tablet 0    [DISCONTINUED] lansoprazole (Prevacid) 15 mg DR capsule Take 1 capsule (15 mg) by mouth once daily in the morning. Take before meals. Do not crush or chew.      [DISCONTINUED] dapagliflozin propanediol (Farxiga) 10 mg Take 1 tablet (10 mg) by mouth early in the morning..      [DISCONTINUED] oxybutynin (Ditropan) 5 mg tablet Take 1 tablet (5 mg) by mouth once daily. 90 tablet 2    [DISCONTINUED] semaglutide (Ozempic) 0.25 mg or 0.5 mg (2 mg/3 mL) pen injector Inject 0.25 mg under the skin every 7 days. 3 mL 0    [DISCONTINUED] sucralfate (Carafate) 1 gram tablet Take 1 tablet (1 g) by mouth. ON AN EMPTY STOMACH, CRUSH 3 TIMES DAILY       No current facility-administered medications on file prior  to visit.        Patient Assistance Screening (VAF)  Patient verbally reports monthly or yearly income which is less than 400% federal poverty level.  Application for program has been submitted for the following medications: Trulicity  Patient has been informed that program team will be reaching out to them to discuss necessary documentation, instructed to answer phone/return voicemail.   Patient aware this process may take up to 6 weeks.   If approved medication must be filled through [facility] pharmacy and may be picked up or mailed to patient.       LABS  LMP  (LMP Unknown)    Lab Results   Component Value Date    HGBA1C 6.8 (H) 07/08/2024    HGBA1C 7.2 (A) 12/05/2023    HGBA1C 8.3 (H) 05/23/2023     Lab Results   Component Value Date    BILITOT 0.2 08/19/2024    CALCIUM 9.7 08/19/2024    CO2 28 08/19/2024     08/19/2024    CREATININE 0.80 08/19/2024    GLUCOSE 105 (H) 08/19/2024    ALKPHOS 46 08/19/2024    K 4.3 08/19/2024    PROT 6.4 08/19/2024     08/19/2024    AST 18 08/19/2024    ALT 9 08/19/2024    BUN 11 08/19/2024    ANIONGAP 8 08/19/2024    MG 2.1 01/15/2020    PHOS 3.7 07/15/2020    ALBUMIN 4.3 08/19/2024     Lab Results   Component Value Date    TRIG 118 07/08/2024    CHOL 184 07/08/2024    LDLCALC 101 07/08/2024    HDL 59.0 07/08/2024         Assessment/Plan    Comments/Recommendations to PCP:  Trulicity Education:  - Counseled patient on Trulicity MOA, expectations, side effects, duration of therapy, administration, and monitoring parameters.  - Provided detailed dosing and administration counseling to ensure proper technique.   - Reviewed Trulicity titration schedule, starting with 0.75 mg once weekly to 1.5 mg, 3 mg, and if tolerated 4.5 mg.  - Counseled patient on the benefits of GLP-1ra, such as cardiovascular risk reduction, glycemic control, and weight loss potential.  - Reviewed storage requirements of Trulicity when not in use, and when to administer the medication if a dose is  missed.  - Advised patient that they may experience improved satiety after meals and portion sizes of meals may be reduced as doses of Trulicity increase.    Leonor Lee, PharmD, BCPS    Verbal consent to manage patient's drug therapy was obtained from the patient and/or an individual authorized to act on behalf of a patient. They were informed they may decline to participate or withdraw from participation in pharmacy services at any time.

## 2024-09-17 ENCOUNTER — PHARMACY VISIT (OUTPATIENT)
Dept: PHARMACY | Facility: CLINIC | Age: 74
End: 2024-09-17
Payer: COMMERCIAL

## 2024-09-23 ENCOUNTER — OFFICE VISIT (OUTPATIENT)
Dept: SURGERY | Facility: CLINIC | Age: 74
End: 2024-09-23
Payer: MEDICARE

## 2024-09-23 DIAGNOSIS — E53.8 VITAMIN B 12 DEFICIENCY: Primary | ICD-10-CM

## 2024-09-23 DIAGNOSIS — K90.9 INTESTINAL MALABSORPTION, UNSPECIFIED TYPE (HHS-HCC): ICD-10-CM

## 2024-09-23 PROCEDURE — 3044F HG A1C LEVEL LT 7.0%: CPT

## 2024-09-23 PROCEDURE — 1036F TOBACCO NON-USER: CPT

## 2024-09-23 PROCEDURE — 1126F AMNT PAIN NOTED NONE PRSNT: CPT

## 2024-09-23 PROCEDURE — 96372 THER/PROPH/DIAG INJ SC/IM: CPT

## 2024-09-23 PROCEDURE — 1159F MED LIST DOCD IN RCRD: CPT

## 2024-09-23 PROCEDURE — 3049F LDL-C 100-129 MG/DL: CPT

## 2024-09-23 RX ORDER — CYANOCOBALAMIN 1000 UG/ML
1000 INJECTION, SOLUTION INTRAMUSCULAR; SUBCUTANEOUS ONCE
Status: COMPLETED | OUTPATIENT
Start: 2024-09-23 | End: 2024-09-23

## 2024-09-23 ASSESSMENT — PATIENT HEALTH QUESTIONNAIRE - PHQ9
2. FEELING DOWN, DEPRESSED OR HOPELESS: NOT AT ALL
SUM OF ALL RESPONSES TO PHQ9 QUESTIONS 1 AND 2: 0
1. LITTLE INTEREST OR PLEASURE IN DOING THINGS: NOT AT ALL
2. FEELING DOWN, DEPRESSED OR HOPELESS: NOT AT ALL
1. LITTLE INTEREST OR PLEASURE IN DOING THINGS: NOT AT ALL
SUM OF ALL RESPONSES TO PHQ9 QUESTIONS 1 AND 2: 0

## 2024-09-23 ASSESSMENT — PAIN SCALES - GENERAL: PAINLEVEL: 0-NO PAIN

## 2024-09-24 ENCOUNTER — APPOINTMENT (OUTPATIENT)
Dept: SURGERY | Facility: CLINIC | Age: 74
End: 2024-09-24
Payer: MEDICARE

## 2024-09-26 ENCOUNTER — APPOINTMENT (OUTPATIENT)
Dept: INFUSION THERAPY | Facility: CLINIC | Age: 74
End: 2024-09-26
Payer: MEDICARE

## 2024-09-26 VITALS
RESPIRATION RATE: 16 BRPM | SYSTOLIC BLOOD PRESSURE: 136 MMHG | DIASTOLIC BLOOD PRESSURE: 74 MMHG | TEMPERATURE: 97.5 F | HEART RATE: 75 BPM | WEIGHT: 148.7 LBS | OXYGEN SATURATION: 95 % | BODY MASS INDEX: 26.98 KG/M2

## 2024-09-26 DIAGNOSIS — M06.09 RHEUMATOID ARTHRITIS OF MULTIPLE SITES WITHOUT RHEUMATOID FACTOR (MULTI): ICD-10-CM

## 2024-09-26 PROCEDURE — 96372 THER/PROPH/DIAG INJ SC/IM: CPT | Performed by: NURSE PRACTITIONER

## 2024-09-26 RX ORDER — EPINEPHRINE 0.3 MG/.3ML
0.3 INJECTION SUBCUTANEOUS EVERY 5 MIN PRN
OUTPATIENT
Start: 2024-10-24

## 2024-09-26 RX ORDER — FAMOTIDINE 10 MG/ML
20 INJECTION INTRAVENOUS ONCE AS NEEDED
OUTPATIENT
Start: 2024-10-24

## 2024-09-26 RX ORDER — DIPHENHYDRAMINE HYDROCHLORIDE 50 MG/ML
50 INJECTION INTRAMUSCULAR; INTRAVENOUS AS NEEDED
OUTPATIENT
Start: 2024-10-24

## 2024-09-26 RX ORDER — ALBUTEROL SULFATE 0.83 MG/ML
3 SOLUTION RESPIRATORY (INHALATION) AS NEEDED
OUTPATIENT
Start: 2024-10-24

## 2024-09-26 ASSESSMENT — ENCOUNTER SYMPTOMS
PALPITATIONS: 0
MYALGIAS: 0
LEG SWELLING: 0
EXTREMITY WEAKNESS: 0
ARTHRALGIAS: 1
WHEEZING: 0
NUMBNESS: 0
WOUND: 0
DIZZINESS: 0
LIGHT-HEADEDNESS: 0
SHORTNESS OF BREATH: 0
COUGH: 0
FATIGUE: 1

## 2024-09-26 ASSESSMENT — PAIN SCALES - GENERAL: PAINLEVEL: 5

## 2024-09-26 NOTE — PROGRESS NOTES
[facility]   Infusion Clinic Note   Date: 2024   Name: Heather Thurston  : 1950   MRN: [id number]          Reason for Visit: Injections (Cimzia)         Today: We administered certolizumab pegol.       Visit Type: INJECTION       Ordered By: Dr Chowdhury       Accompanied by:Self       Diagnosis: Rheumatoid arthritis of multiple sites without rheumatoid factor (Multi)        Allergies:   Allergies as of 2024 - Reviewed 2024   Allergen Reaction Noted    Atropine Anaphylaxis, Nausea And Vomiting, Palpitations, and Rash 2023    Codeine Itching and Unknown 2023    Ezetimibe Unknown 2023    Iodinated contrast media Other and Syncope 2023    Iodine Nausea/vomiting 10/16/2019    Lisinopril Cough 2023    Metformin hcl Diarrhea 2023    Simvastatin Unknown 2023    Adhesive tape-silicones Rash 2023          Current Medications has a current medication list which includes the following prescription(s): acetaminophen, amitriptyline, ascorbic acid, calcium carbonate, calcium citrate, cimzia, cholecalciferol, cyanocobalamin, prolia, dicyclomine, trulicity, erythromycin, famotidine, gabapentin, glimepiride, leflunomide, multivit-minerals/folic acid, omeprazole, oxybutynin, sertraline, sulfasalazine, and tramadol.       Vitals:   Vitals:    24 1104   BP: 136/74   Pulse: 75   Resp: 16   Temp: 36.4 °C (97.5 °F)   TempSrc: Temporal   SpO2: 95%   Weight: 67.4 kg (148 lb 11.2 oz)   PainSc:   5   PainLoc: Generalized             Infusion Pre-procedure Checklist:   - Allergies reviewed: yes   - Medications reviewed: yes       - Previous reaction to current treatment: no      Assess patient for the concerns below. Document provider notification as appropriate.  - Active or recent infection with/without current antibiotic use: no  - Recent or planned invasive dental work: no  - Recent or planned surgeries: no  - Recently received or  "plans to receive vaccinations: no  - Has treatment related toxicities: yes  - Is pregnant:  n/a      Pain: 5   - Is the pain different from normal: no   - Is your Doctor aware:  n/a       Labs: na          Fall Risk Screening: More Fall Risk  History of Falling, Immediate or Within 3 Months: No  Secondary Diagnosis: No  Ambulatory Aid: Walks without aid/bedrest/nurse assist  Intravenous Therapy/Heparin Lock: No  Gait/Transferring: Normal/bedrest/immobile  Mental Status: Oriented to own ability  More Fall Risk Score: 0       Review Of Systems:  Review of Systems   Constitutional:  Positive for fatigue.   Respiratory:  Negative for cough, shortness of breath and wheezing.    Cardiovascular:  Negative for chest pain, leg swelling and palpitations.   Musculoskeletal:  Positive for arthralgias. Negative for myalgias.   Skin:  Negative for itching, rash and wound.   Neurological:  Negative for dizziness, extremity weakness, light-headedness and numbness.         ROS completed? Yes      Infusion Readiness:  - Assessment Concerns Related to Infusion: No  - Provider notified: n/a      Document Below Only If Indicated:   New Patient Education:    N/A (returning patient for continuation of therapy. Ongoing education provided as needed.)        Treatment Conditions & Drug Specific Questions:    Certolizumab Pegol  (CIMZIA)    (Unless otherwise specified on patient specific therapy plan):     TREATMENT CONDITIONS:  Unless otherwise specified on patient specific therapy plan HOLD treatment and notify provider prior to proceeding with today's infusion if patient with a:  o Positive Hepatitis B Surface Ag  o Positive T-Spot  o NO baseline CBC-D obtained  o NO baseline CMP obtained    No results found for: \"HAGCN\", \"HEPBSURABI\", \"HBSAG\", \"XHAGF\", \"HEPBSAG\", \"EXTHEPBSAG\", \"NONUHSWGH\"   No results found for: \"NONUHFIRE\", \"NONUHSWGH\", \"NONUHFISH\", \"EXTHEPBSAG\"  Lab Results   Component Value Date    TBSIN Negative 07/30/2024      Lab " Results   Component Value Date    WBC 5.1 09/06/2024    HGB 13.5 09/06/2024    HCT 44.6 09/06/2024    MCV 89 09/06/2024     09/06/2024        Chemistry    Lab Results   Component Value Date/Time     08/19/2024 1310    K 4.3 08/19/2024 1310     08/19/2024 1310    CO2 28 08/19/2024 1310    BUN 11 08/19/2024 1310    CREATININE 0.80 08/19/2024 1310    Lab Results   Component Value Date/Time    CALCIUM 9.7 08/19/2024 1310    ALKPHOS 46 08/19/2024 1310    AST 18 08/19/2024 1310    ALT 9 08/19/2024 1310    BILITOT 0.2 08/19/2024 1310             Labs reviewed and patient meets treatment conditions? Yes    DRUG SPECIFIC QUESTIONS:  - Latex Allergy? No     (Caution: Prefilled syringe may include Latex)    - Any new or worsening signs / symptoms of heart failure which may include things like worsening shortness of breath, swelling, fatigue? No   (If yes notify provider before proceeding with today's infusion. New onset or worsening HF have been reported)      REMINDERS:  Recommended Vitals/Observation:  Vitals: Obtain vitals prior to injection.  Observation: Patient may leave immediately following injection.        Weight Based Drug Calculations:    WEIGHT BASED DRUGS: NOT APPLICABLE / FLAT DOSE          Initiated By: Merrill Colindres, ROD-CNP

## 2024-09-26 NOTE — PATIENT INSTRUCTIONS
Today :We administered certolizumab pegol.     For:   1. Rheumatoid arthritis of multiple sites without rheumatoid factor (Multi)         Your next appointment is due in:  10/24/2024        Please read the  Medication Guide that was given to you and reviewed during todays visit.     (Tell all doctors including dentists that you are taking this medication)     Go to the emergency room or call 911 if:  -You have signs of allergic reaction:   -Rash, hives, itching.   -Swollen, blistered, peeling skin.   -Swelling of face, lips, mouth, tongue or throat.   -Tightness of chest, trouble breathing, swallowing or talking     Call your doctor:  - If IV / injection site gets red, warm, swollen, itchy or leaks fluid or pus.     (Leave dressing on your IV site for at least 2 hours and keep area clean and dry  - If you get sick or have symptoms of infection or are not feeling well for any reason.    (Wash your hands often, stay away from people who are sick)  - If you have side effects from your medication that do not go away or are bothersome.     (Refer to the teaching your nurse gave you for side effects to call your doctor about)    - Common side effects may include:  stuffy nose, headache, feeling tired, muscle aches, upset stomach  - Before receiving any vaccines     - Call [facility] at   If:  - You get sick, are on antibiotics, have had a recent vaccine, have surgery or dental work and your doctor wants your visit rescheduled.  - You need to cancel and reschedule your visit for any reason. Call at least 2 days before your visit if you need to cancel.   - Your insurance changes before your next visit.    (We will need to get approval from your new insurance. This can take up to two weeks.)     The [facility] is opened Monday thru Friday. We are closed on weekends and holidays.   Voice mail will take your call if the center is closed. If you leave a message please allow 24 hours for a  call back during weekdays. If you leave a message on a weekend/holiday, we will call you back the next business day.

## 2024-10-01 NOTE — PROGRESS NOTES
Subjective   Patient ID: Heather Thurston is a 74 y.o. female who presents for No chief complaint on file..  HPI  6 YR FUV RYGB  START WT: 204   IDEAL WT:  135   START EXCESS: 69  HT:62.25 IN.   HAD IV IRON ON 09/26/2024 WILL HAVE TO MORE/ IT IS ALREADY SCHEDULED/ SHE WONT HAVE LABS DONE FOR TODAY'S APPT  Review of Systems  Review of Systems   CONSTITUTIONAL:          Chills No.  Fatigue No.  Fever No.         CARDIOLOGY:          Negative for dizziness, chest pain, palpitations, shortness of breath.         RESPIRATORY:          Sleep Apnea No.  Negative for chest congestion, cough, wheezing.         GASTROENTEROLOGY:          Food Intolerance No.  Abdominal pain DESCRIBED as stomach burning  Acid reflux No.  Black stools No.  Constipation No.  Diarrhea yes Loss of appetite no.  Nausea YES.  Vomiting No.         UROLOGY:          Negative for dysuria, urinary urgency, kidney stones.         PSYCHOLOGY:          ADMITS TO  depression, anxiety, high stress level.    Objective   Physical Exam    Assessment/Plan   {Assess/PlanSmartLinks:48042}         Blanche Ware LPN 10/01/24 1:04 PM

## 2024-10-08 ENCOUNTER — OFFICE VISIT (OUTPATIENT)
Dept: SURGERY | Facility: CLINIC | Age: 74
End: 2024-10-08
Payer: MEDICARE

## 2024-10-08 ENCOUNTER — APPOINTMENT (OUTPATIENT)
Dept: SURGERY | Facility: CLINIC | Age: 74
End: 2024-10-08
Payer: MEDICARE

## 2024-10-08 DIAGNOSIS — E53.8 VITAMIN B 12 DEFICIENCY: Primary | ICD-10-CM

## 2024-10-08 DIAGNOSIS — K90.9 INTESTINAL MALABSORPTION, UNSPECIFIED TYPE (HHS-HCC): ICD-10-CM

## 2024-10-08 PROCEDURE — 3049F LDL-C 100-129 MG/DL: CPT

## 2024-10-08 PROCEDURE — 1126F AMNT PAIN NOTED NONE PRSNT: CPT

## 2024-10-08 PROCEDURE — 1036F TOBACCO NON-USER: CPT

## 2024-10-08 PROCEDURE — RXMED WILLOW AMBULATORY MEDICATION CHARGE

## 2024-10-08 PROCEDURE — 3044F HG A1C LEVEL LT 7.0%: CPT

## 2024-10-08 PROCEDURE — 96372 THER/PROPH/DIAG INJ SC/IM: CPT

## 2024-10-08 RX ORDER — CYANOCOBALAMIN 1000 UG/ML
1000 INJECTION, SOLUTION INTRAMUSCULAR; SUBCUTANEOUS ONCE
Status: COMPLETED | OUTPATIENT
Start: 2024-10-08 | End: 2024-10-08

## 2024-10-08 ASSESSMENT — PAIN SCALES - GENERAL: PAINLEVEL: 0-NO PAIN

## 2024-10-09 DIAGNOSIS — E11.65 TYPE 2 DIABETES MELLITUS WITH HYPERGLYCEMIA, WITHOUT LONG-TERM CURRENT USE OF INSULIN: Primary | ICD-10-CM

## 2024-10-11 ENCOUNTER — TELEMEDICINE (OUTPATIENT)
Dept: PHARMACY | Facility: HOSPITAL | Age: 74
End: 2024-10-11
Payer: MEDICARE

## 2024-10-11 ENCOUNTER — PHARMACY VISIT (OUTPATIENT)
Dept: PHARMACY | Facility: CLINIC | Age: 74
End: 2024-10-11
Payer: COMMERCIAL

## 2024-10-11 DIAGNOSIS — E11.65 TYPE 2 DIABETES MELLITUS WITH HYPERGLYCEMIA, WITHOUT LONG-TERM CURRENT USE OF INSULIN: ICD-10-CM

## 2024-10-11 NOTE — PROGRESS NOTES
MEDICATION MANAGEMENT    Heather Thurston is a 74 y.o. female who was referred by Wily Cooper to complete medication reconciliation and review with the clinical pharmacist.  A comprehensive medication review was completed with the patient via telephone today.  Patient reports financial barrier with current medication.      MEDICATION HISTORY  Allergies   Allergen Reactions    Atropine Anaphylaxis, Nausea And Vomiting, Palpitations and Rash     HEART RACING    Codeine Itching and Unknown    Ezetimibe Unknown    Iodinated Contrast Media Other and Syncope     SYNCOPE    NAUSEA    VOMITING    Iodine Nausea/vomiting    Lisinopril Cough    Metformin Hcl Diarrhea    Simvastatin Unknown    Adhesive Tape-Silicones Rash     Current Outpatient Medications on File Prior to Visit   Medication Sig Dispense Refill    acetaminophen (Tylenol 8 HOUR) 650 mg ER tablet Take 1 tablet (650 mg) by mouth every 8 hours if needed for mild pain (1 - 3). Do not crush, chew, or split.  Tylenol arthritis takes 2 a day      amitriptyline (Elavil) 10 mg tablet Take 2 tablets (20 mg) by mouth once daily at bedtime.      ascorbic acid (Vitamin C) 250 MG chewable tablet Chew 1 tablet (250 mg). Gummies dose unknown      calcium carbonate (Tums) 200 mg calcium chewable tablet Chew 1 tablet (500 mg) once daily.      CALCIUM CITRATE ORAL Take 500 mg by mouth 2 times a day.      certolizumab pegol (Cimzia) injection Inject 2 mL (400 mg) under the skin every 28 (twenty-eight) days. 2 mL 11    cholecalciferol (Vitamin D3) 1,250 mcg (50,000 unit) tablet Take 1 capsule by mouth 1 (one) time per week.      cyanocobalamin (Vitamin B-12) 1,000 mcg/mL injection Inject into the shoulder, thigh, or buttocks every 30 (thirty) days.      denosumab (Prolia) 60 mg/mL syringe Inject 1 mL (60 mg total) under the skin every 6 months.  As directed 1 mL 1    dicyclomine (Bentyl) 10 mg capsule Take 1 capsule (10 mg) by mouth 3 times a day.      dulaglutide (Trulicity)  0.75 mg/0.5 mL pen injector Inject 0.75 mg under the skin 1 (one) time per week. 2 mL 3    erythromycin (Romycin) 5 mg/gram (0.5 %) ophthalmic ointment APPLY 1/2 INCH ON EYELID THREE TIMES DAILY FOR USE AFTER SURGERY      famotidine (Pepcid) 10 mg tablet Take 1 tablet (10 mg) by mouth once daily as needed for heartburn.      gabapentin (Neurontin) 300 mg capsule Take 1 capsule (300 mg) by mouth 3 times a day. 270 capsule 1    leflunomide (Arava) 20 mg tablet TAKE 1 TABLET(20 MG) BY MOUTH DAILY 90 tablet 1    multivit-minerals/folic acid (ADULT ONE DAILY MULTIVITAMIN ORAL) Take by mouth.      omeprazole (PriLOSEC) 40 mg DR capsule Take 1 capsule (40 mg) by mouth 2 times a day before meals. 180 capsule 1    oxybutynin (Ditropan) 5 mg tablet TAKE 1 TABLET BY MOUTH ONCE  DAILY 90 tablet 0    sertraline (Zoloft) 50 mg tablet Take 1 tablet (50 mg) by mouth once daily. 90 tablet 3    sulfaSALAzine (Azulfidine) 500 mg tablet Take 1 tablet (500 mg) by mouth 2 times a day.      traMADol (Ultram) 50 mg tablet Take 1 tablet (50 mg) by mouth once daily as needed for moderate pain (4 - 6) (OA/RA). 90 tablet 0    [DISCONTINUED] glimepiride (Amaryl) 2 mg tablet Take 0.5 tablets (1 mg) by mouth once daily in the morning. Take before meals.       No current facility-administered medications on file prior to visit.        Patient Assistance Screening (VAF)  Patient verbally reports monthly or yearly income which is less than 400% federal poverty level.  Application for program has been submitted for the following medications: Trulicity  Patient has been informed that program team will be reaching out to them to discuss necessary documentation, instructed to answer phone/return voicemail.   Patient aware this process may take up to 6 weeks.   If approved medication must be filled through [facility] pharmacy and may be picked up or mailed to patient.       LABS  LMP  (LMP Unknown)    Lab Results   Component Value Date    HGBA1C 6.8  (H) 07/08/2024    HGBA1C 7.2 (A) 12/05/2023    HGBA1C 8.3 (H) 05/23/2023     Lab Results   Component Value Date    BILITOT 0.2 08/19/2024    CALCIUM 9.7 08/19/2024    CO2 28 08/19/2024     08/19/2024    CREATININE 0.80 08/19/2024    GLUCOSE 105 (H) 08/19/2024    ALKPHOS 46 08/19/2024    K 4.3 08/19/2024    PROT 6.4 08/19/2024     08/19/2024    AST 18 08/19/2024    ALT 9 08/19/2024    BUN 11 08/19/2024    ANIONGAP 8 08/19/2024    MG 2.1 01/15/2020    PHOS 3.7 07/15/2020    ALBUMIN 4.3 08/19/2024     Lab Results   Component Value Date    TRIG 118 07/08/2024    CHOL 184 07/08/2024    LDLCALC 101 07/08/2024    HDL 59.0 07/08/2024         Assessment/Plan    Comments/Recommendations to PCP:    Trulicity Education:    - Counseled patient on Trulicity MOA, expectations, side effects, duration of therapy, administration, and monitoring parameters.  - Provided detailed dosing and administration counseling to ensure proper technique.   - Reviewed Trulicity titration schedule, starting with 0.75 mg once weekly to 1.5 mg, 3 mg, and if tolerated 4.5 mg.  - Counseled patient on the benefits of GLP-1ra, such as cardiovascular risk reduction, glycemic control, and weight loss potential.  - Reviewed storage requirements of Trulicity when not in use, and when to administer the medication if a dose is missed.  - Advised patient that they may experience improved satiety after meals and portion sizes of meals may be reduced as doses of Trulicity increase.    Patient has been on Trulicity for 1 month, is tolerating well. No adverse effects noted. Will inrease to 1.5 mg.    Follow up with PCP in 2 months    Leonor Lee, PharmD, BCPS    Verbal consent to manage patient's drug therapy was obtained from the patient and/or an individual authorized to act on behalf of a patient. They were informed they may decline to participate or withdraw from participation in pharmacy services at any time.

## 2024-10-18 DIAGNOSIS — E11.9 TYPE 2 DIABETES MELLITUS WITHOUT COMPLICATION, UNSPECIFIED WHETHER LONG TERM INSULIN USE (MULTI): Primary | ICD-10-CM

## 2024-10-18 PROCEDURE — RXMED WILLOW AMBULATORY MEDICATION CHARGE

## 2024-10-18 RX ORDER — DULAGLUTIDE 1.5 MG/.5ML
1.5 INJECTION, SOLUTION SUBCUTANEOUS WEEKLY
Qty: 2 ML | Refills: 11 | Status: SHIPPED | OUTPATIENT
Start: 2024-10-18

## 2024-10-22 ENCOUNTER — PHARMACY VISIT (OUTPATIENT)
Dept: PHARMACY | Facility: CLINIC | Age: 74
End: 2024-10-22
Payer: COMMERCIAL

## 2024-10-23 ENCOUNTER — APPOINTMENT (OUTPATIENT)
Dept: SURGERY | Facility: CLINIC | Age: 74
End: 2024-10-23
Payer: MEDICARE

## 2024-10-24 ENCOUNTER — APPOINTMENT (OUTPATIENT)
Dept: INFUSION THERAPY | Facility: CLINIC | Age: 74
End: 2024-10-24
Payer: MEDICARE

## 2024-10-24 VITALS
RESPIRATION RATE: 18 BRPM | BODY MASS INDEX: 28.63 KG/M2 | DIASTOLIC BLOOD PRESSURE: 90 MMHG | SYSTOLIC BLOOD PRESSURE: 146 MMHG | WEIGHT: 157.8 LBS | HEART RATE: 76 BPM | TEMPERATURE: 98.2 F

## 2024-10-24 DIAGNOSIS — M06.09 RHEUMATOID ARTHRITIS OF MULTIPLE SITES WITHOUT RHEUMATOID FACTOR (MULTI): ICD-10-CM

## 2024-10-24 PROCEDURE — 96372 THER/PROPH/DIAG INJ SC/IM: CPT | Performed by: NURSE PRACTITIONER

## 2024-10-24 RX ORDER — EPINEPHRINE 0.3 MG/.3ML
0.3 INJECTION SUBCUTANEOUS EVERY 5 MIN PRN
OUTPATIENT
Start: 2024-11-21

## 2024-10-24 RX ORDER — ALBUTEROL SULFATE 0.83 MG/ML
3 SOLUTION RESPIRATORY (INHALATION) AS NEEDED
OUTPATIENT
Start: 2024-11-21

## 2024-10-24 RX ORDER — DIPHENHYDRAMINE HYDROCHLORIDE 50 MG/ML
50 INJECTION INTRAMUSCULAR; INTRAVENOUS AS NEEDED
OUTPATIENT
Start: 2024-11-21

## 2024-10-24 RX ORDER — FAMOTIDINE 10 MG/ML
20 INJECTION INTRAVENOUS ONCE AS NEEDED
OUTPATIENT
Start: 2024-11-21

## 2024-10-24 ASSESSMENT — ENCOUNTER SYMPTOMS
FATIGUE: 1
ABDOMINAL PAIN: 0
CHEST TIGHTNESS: 0
DYSURIA: 0
COUGH: 0
FREQUENCY: 0
LEG SWELLING: 0

## 2024-10-24 ASSESSMENT — PAIN SCALES - GENERAL: PAINLEVEL_OUTOF10: 6

## 2024-10-24 NOTE — PROGRESS NOTES
[facility]   Infusion Clinic Note   Date: 2024   Name: Heather Thurston  : 1950   MRN: [id number]          Reason for Visit: OP Infusion (Cimzia)         Today: We administered certolizumab pegol.       Visit Type: INJECTION       Ordered By: Dr. KATHLEEN       Accompanied by:Self       Diagnosis: Rheumatoid arthritis of multiple sites without rheumatoid factor (Multi)        Allergies:   Allergies as of 10/24/2024 - Reviewed 10/24/2024   Allergen Reaction Noted    Atropine Anaphylaxis, Nausea And Vomiting, Palpitations, and Rash 2023    Codeine Itching and Unknown 2023    Ezetimibe Unknown 2023    Iodinated contrast media Other and Syncope 2023    Iodine Nausea/vomiting 10/16/2019    Lisinopril Cough 2023    Metformin hcl Diarrhea 2023    Simvastatin Unknown 2023    Adhesive tape-silicones Rash 2023          Current Medications has a current medication list which includes the following prescription(s): acetaminophen, amitriptyline, ascorbic acid, calcium carbonate, calcium citrate, cimzia, cholecalciferol, cyanocobalamin, prolia, dicyclomine, trulicity, trulicity, erythromycin, famotidine, gabapentin, leflunomide, multivit-minerals/folic acid, omeprazole, oxybutynin, sertraline, sulfasalazine, and tramadol.       Vitals:   Vitals:    10/24/24 1110   BP: 146/90   Pulse: 76   Resp: 18   Temp: 36.8 °C (98.2 °F)   Weight: 71.6 kg (157 lb 12.8 oz)   PainSc:   6   PainLoc: Comment: spine             Infusion Pre-procedure Checklist:   - Allergies reviewed: yes   - Medications reviewed: yes       - Previous reaction to current treatment: no      Assess patient for the concerns below. Document provider notification as appropriate.  - Active or recent infection with/without current antibiotic use: no  - Recent or planned invasive dental work: no  - Recent or planned surgeries: no  - Recently received or plans to receive vaccinations:  "no  - Has treatment related toxicities: no  - Is pregnant:  n/a      Pain: 6   - Is the pain different from normal: no   - Is the pain tolerable: yes   - Is your Doctor aware:  yes       Labs:  reviewed          Fall Risk Screening: Derik Fall Risk  History of Falling, Immediate or Within 3 Months: No  Secondary Diagnosis: Yes  Ambulatory Aid: Walks without aid/bedrest/nurse assist  Intravenous Therapy/Heparin Lock: No  Gait/Transferring: Normal/bedrest/immobile  Mental Status: Oriented to own ability  More Fall Risk Score: 15       Review Of Systems:  Review of Systems   Constitutional:  Positive for fatigue.   Respiratory:  Negative for chest tightness and cough.    Cardiovascular:  Negative for chest pain and leg swelling.   Gastrointestinal:  Negative for abdominal pain.   Genitourinary:  Negative for dysuria and frequency.    All other systems reviewed and are negative.        ROS completed? Yes      Infusion Readiness:  - Assessment Concerns Related to Infusion: No  - Provider notified: no      Document Below Only If Indicated:   New Patient Education:    N/A (returning patient for continuation of therapy. Ongoing education provided as needed.)        Treatment Conditions & Drug Specific Questions:    Denosumab  (PROLIA. XGEVA)    (Unless otherwise specified on patient specific therapy plan):     TREATMENT CONDITIONS:  Unless otherwise specified on patient specific therapy plan HOLD and notify provider prior to proceeding with today's injection if patients:  o Calcium is LESS THAN 8.6 mg/dL OR  Ionized Calcium LESS THAN 1.1 mmol/L  o Recent or planned invasive dental procedure (within 4 weeks)    Lab Results   Component Value Date    CALCIUM 9.7 08/19/2024    PHOS 3.7 07/15/2020      No results found for: \"CAION\"    Labs reviewed and patient meets treatment conditions? Yes    DRUG SPECIFIC QUESTIONS:  Is the patient taking calcium and vitamin D? Yes  (Recommended)    Pt Instructed on following risks: (1) " hypocalcemia, (2) osteonecrosis of the jaw, (3) atypical femoral fractures, (4) serious infections, and (5) dermatologic reactions?  Yes      REMINDER:  PREGNANCY CATEGORY X DRUG. OBTAIN NEGTATIVE PREGNANCY TEST PRIOR TO FIRST INFUSION FOR WOMEN OF CHILDBEARING ABILITY   REMS DRUG    Recommended Vitals/Observation:  Vitals: Obtain vitals prior to injection.  Observation: Patient may leave immediately following injection.        Weight Based Drug Calculations:    WEIGHT BASED DRUGS: NOT APPLICABLE / FLAT DOSE    1145 Patient tolerated injection well.           Initiated By: Pearl Alvarado RN

## 2024-10-30 ENCOUNTER — APPOINTMENT (OUTPATIENT)
Dept: RHEUMATOLOGY | Facility: CLINIC | Age: 74
End: 2024-10-30
Payer: MEDICARE

## 2024-10-30 VITALS
SYSTOLIC BLOOD PRESSURE: 136 MMHG | HEIGHT: 62 IN | HEART RATE: 87 BPM | OXYGEN SATURATION: 98 % | WEIGHT: 158.2 LBS | BODY MASS INDEX: 29.11 KG/M2 | DIASTOLIC BLOOD PRESSURE: 86 MMHG

## 2024-10-30 DIAGNOSIS — Z79.899 ENCOUNTER FOR LONG-TERM (CURRENT) USE OF MEDICATIONS: ICD-10-CM

## 2024-10-30 DIAGNOSIS — M19.90 CHRONIC ARTHRITIS: ICD-10-CM

## 2024-10-30 DIAGNOSIS — M81.0 AGE RELATED OSTEOPOROSIS, UNSPECIFIED PATHOLOGICAL FRACTURE PRESENCE: ICD-10-CM

## 2024-10-30 DIAGNOSIS — M06.9 RHEUMATOID ARTHRITIS, INVOLVING UNSPECIFIED SITE, UNSPECIFIED WHETHER RHEUMATOID FACTOR PRESENT (MULTI): Primary | ICD-10-CM

## 2024-10-30 PROCEDURE — 99214 OFFICE O/P EST MOD 30 MIN: CPT | Performed by: INTERNAL MEDICINE

## 2024-10-30 PROCEDURE — 3008F BODY MASS INDEX DOCD: CPT | Performed by: INTERNAL MEDICINE

## 2024-10-30 PROCEDURE — 3049F LDL-C 100-129 MG/DL: CPT | Performed by: INTERNAL MEDICINE

## 2024-10-30 PROCEDURE — 1159F MED LIST DOCD IN RCRD: CPT | Performed by: INTERNAL MEDICINE

## 2024-10-30 PROCEDURE — 3079F DIAST BP 80-89 MM HG: CPT | Performed by: INTERNAL MEDICINE

## 2024-10-30 PROCEDURE — 3075F SYST BP GE 130 - 139MM HG: CPT | Performed by: INTERNAL MEDICINE

## 2024-10-30 PROCEDURE — G2211 COMPLEX E/M VISIT ADD ON: HCPCS | Performed by: INTERNAL MEDICINE

## 2024-10-30 PROCEDURE — 3044F HG A1C LEVEL LT 7.0%: CPT | Performed by: INTERNAL MEDICINE

## 2024-10-30 RX ORDER — TRAMADOL HYDROCHLORIDE 50 MG/1
50 TABLET ORAL DAILY PRN
Qty: 90 TABLET | Refills: 0 | Status: SHIPPED | OUTPATIENT
Start: 2024-10-30 | End: 2025-01-28

## 2024-10-30 ASSESSMENT — ENCOUNTER SYMPTOMS
NAUSEA: 1
ROS SKIN COMMENTS: HAIR THINNING
ROS GI COMMENTS: HEARTBURN
SHORTNESS OF BREATH: 1

## 2024-11-06 DIAGNOSIS — M06.09 RHEUMATOID ARTHRITIS OF MULTIPLE SITES WITHOUT RHEUMATOID FACTOR (MULTI): Primary | ICD-10-CM

## 2024-11-06 RX ORDER — HEPARIN 100 UNIT/ML
500 SYRINGE INTRAVENOUS AS NEEDED
OUTPATIENT
Start: 2024-11-06

## 2024-11-06 RX ORDER — HEPARIN SODIUM,PORCINE/PF 10 UNIT/ML
50 SYRINGE (ML) INTRAVENOUS AS NEEDED
OUTPATIENT
Start: 2024-11-06

## 2024-11-07 ENCOUNTER — LAB (OUTPATIENT)
Dept: LAB | Facility: LAB | Age: 74
End: 2024-11-07
Payer: MEDICARE

## 2024-11-07 DIAGNOSIS — E55.9 VITAMIN D DEFICIENCY: ICD-10-CM

## 2024-11-07 DIAGNOSIS — E21.3 HYPERPARATHYROIDISM (MULTI): ICD-10-CM

## 2024-11-07 DIAGNOSIS — K90.9 INTESTINAL MALABSORPTION, UNSPECIFIED TYPE (HHS-HCC): ICD-10-CM

## 2024-11-07 DIAGNOSIS — E53.8 VITAMIN B 12 DEFICIENCY: ICD-10-CM

## 2024-11-07 LAB
ALBUMIN SERPL BCP-MCNC: 4.2 G/DL (ref 3.4–5)
ALP SERPL-CCNC: 32 U/L (ref 33–136)
ALT SERPL W P-5'-P-CCNC: 10 U/L (ref 7–45)
ANION GAP SERPL CALCULATED.3IONS-SCNC: 7 MMOL/L (ref 10–20)
AST SERPL W P-5'-P-CCNC: 18 U/L (ref 9–39)
BASOPHILS # BLD AUTO: 0.06 X10*3/UL (ref 0–0.1)
BASOPHILS NFR BLD AUTO: 1.2 %
BILIRUB SERPL-MCNC: 0.4 MG/DL (ref 0–1.2)
BUN SERPL-MCNC: 10 MG/DL (ref 6–23)
CALCIUM SERPL-MCNC: 8.9 MG/DL (ref 8.6–10.3)
CHLORIDE SERPL-SCNC: 103 MMOL/L (ref 98–107)
CO2 SERPL-SCNC: 32 MMOL/L (ref 21–32)
CREAT SERPL-MCNC: 0.66 MG/DL (ref 0.5–1.05)
EGFRCR SERPLBLD CKD-EPI 2021: >90 ML/MIN/1.73M*2
EOSINOPHIL # BLD AUTO: 0.23 X10*3/UL (ref 0–0.4)
EOSINOPHIL NFR BLD AUTO: 4.5 %
ERYTHROCYTE [DISTWIDTH] IN BLOOD BY AUTOMATED COUNT: 15.6 % (ref 11.5–14.5)
FERRITIN SERPL-MCNC: 178 NG/ML (ref 8–150)
GLUCOSE SERPL-MCNC: 142 MG/DL (ref 74–99)
HCT VFR BLD AUTO: 41.1 % (ref 36–46)
HGB BLD-MCNC: 13 G/DL (ref 12–16)
IMM GRANULOCYTES # BLD AUTO: 0.01 X10*3/UL (ref 0–0.5)
IMM GRANULOCYTES NFR BLD AUTO: 0.2 % (ref 0–0.9)
IRON SATN MFR SERPL: 28 % (ref 25–45)
IRON SERPL-MCNC: 108 UG/DL (ref 35–150)
LYMPHOCYTES # BLD AUTO: 1.85 X10*3/UL (ref 0.8–3)
LYMPHOCYTES NFR BLD AUTO: 36.6 %
MCH RBC QN AUTO: 28.5 PG (ref 26–34)
MCHC RBC AUTO-ENTMCNC: 31.6 G/DL (ref 32–36)
MCV RBC AUTO: 90 FL (ref 80–100)
MONOCYTES # BLD AUTO: 0.53 X10*3/UL (ref 0.05–0.8)
MONOCYTES NFR BLD AUTO: 10.5 %
NEUTROPHILS # BLD AUTO: 2.38 X10*3/UL (ref 1.6–5.5)
NEUTROPHILS NFR BLD AUTO: 47 %
NRBC BLD-RTO: 0 /100 WBCS (ref 0–0)
PLATELET # BLD AUTO: 198 X10*3/UL (ref 150–450)
POTASSIUM SERPL-SCNC: 4.2 MMOL/L (ref 3.5–5.3)
PROT SERPL-MCNC: 6.2 G/DL (ref 6.4–8.2)
PTH-INTACT SERPL-MCNC: 169.7 PG/ML (ref 18.5–88)
RBC # BLD AUTO: 4.56 X10*6/UL (ref 4–5.2)
SODIUM SERPL-SCNC: 138 MMOL/L (ref 136–145)
TIBC SERPL-MCNC: 388 UG/DL (ref 240–445)
UIBC SERPL-MCNC: 280 UG/DL (ref 110–370)
WBC # BLD AUTO: 5.1 X10*3/UL (ref 4.4–11.3)

## 2024-11-07 PROCEDURE — 82607 VITAMIN B-12: CPT

## 2024-11-07 PROCEDURE — 83550 IRON BINDING TEST: CPT

## 2024-11-07 PROCEDURE — 80053 COMPREHEN METABOLIC PANEL: CPT

## 2024-11-07 PROCEDURE — 82306 VITAMIN D 25 HYDROXY: CPT

## 2024-11-07 PROCEDURE — 84630 ASSAY OF ZINC: CPT

## 2024-11-07 PROCEDURE — 82728 ASSAY OF FERRITIN: CPT

## 2024-11-07 PROCEDURE — 82746 ASSAY OF FOLIC ACID SERUM: CPT

## 2024-11-07 PROCEDURE — 83540 ASSAY OF IRON: CPT

## 2024-11-07 PROCEDURE — 36415 COLL VENOUS BLD VENIPUNCTURE: CPT

## 2024-11-07 PROCEDURE — 82525 ASSAY OF COPPER: CPT

## 2024-11-07 PROCEDURE — 83970 ASSAY OF PARATHORMONE: CPT

## 2024-11-07 PROCEDURE — 85025 COMPLETE CBC W/AUTO DIFF WBC: CPT

## 2024-11-07 PROCEDURE — 84425 ASSAY OF VITAMIN B-1: CPT

## 2024-11-08 LAB
25(OH)D3 SERPL-MCNC: 32 NG/ML (ref 30–100)
FOLATE SERPL-MCNC: 16.6 NG/ML
VIT B12 SERPL-MCNC: 964 PG/ML (ref 211–911)

## 2024-11-09 LAB
COPPER SERPL-MCNC: 75.6 UG/DL (ref 80–155)
ZINC SERPL-MCNC: 67.9 UG/DL (ref 60–120)

## 2024-11-11 NOTE — PROGRESS NOTES
Subjective   Patient ID: Heather Thurston is a 74 y.o. female who presents for No chief complaint on file..  HPI  FUV LABS/ B 12  START WT: 204   IDEAL WT:  135   START EXCESS:   69  TWL: 52 EWL: 75 %  HT:62.25 IN.   Review of Systems   CONSTITUTIONAL:          Chills No.  Fatigue yes.  Fever No.         CARDIOLOGY:          Negative for dizziness, chest pain, palpitations, shortness of breath.         RESPIRATORY:          Sleep Apnea No.  Negative for chest congestion, cough, wheezing.         GASTROENTEROLOGY:          Food Intolerance No.  Abdominal pain DESCRIBED as stomach burning  Acid reflux yes.  Black stools No.  Constipation No.  Diarrhea yes Loss of appetite no.  Nausea YES.  Vomiting No.         UROLOGY:          Negative for dysuria, urinary urgency, kidney stones.         PSYCHOLOGY:          ADMITS TO  depression, anxiety, high stress level.    Objective   Physical Exam    Assessment/Plan            Blanche Ware LPN 11/11/24 10:47 AM

## 2024-11-14 ENCOUNTER — NUTRITION (OUTPATIENT)
Dept: SURGERY | Facility: CLINIC | Age: 74
End: 2024-11-14

## 2024-11-14 ENCOUNTER — APPOINTMENT (OUTPATIENT)
Dept: SURGERY | Facility: CLINIC | Age: 74
End: 2024-11-14
Payer: MEDICARE

## 2024-11-14 VITALS
DIASTOLIC BLOOD PRESSURE: 84 MMHG | WEIGHT: 152 LBS | HEIGHT: 62 IN | HEART RATE: 83 BPM | BODY MASS INDEX: 27.97 KG/M2 | SYSTOLIC BLOOD PRESSURE: 151 MMHG

## 2024-11-14 DIAGNOSIS — K90.9 INTESTINAL MALABSORPTION, UNSPECIFIED TYPE (HHS-HCC): ICD-10-CM

## 2024-11-14 DIAGNOSIS — K21.9 GASTROESOPHAGEAL REFLUX DISEASE, UNSPECIFIED WHETHER ESOPHAGITIS PRESENT: ICD-10-CM

## 2024-11-14 DIAGNOSIS — E21.3 HYPERPARATHYROIDISM (MULTI): ICD-10-CM

## 2024-11-14 DIAGNOSIS — E53.8 VITAMIN B 12 DEFICIENCY: ICD-10-CM

## 2024-11-14 DIAGNOSIS — E61.0 COPPER DEFICIENCY: Primary | ICD-10-CM

## 2024-11-14 DIAGNOSIS — Z98.84 HISTORY OF GASTRIC BYPASS: ICD-10-CM

## 2024-11-14 DIAGNOSIS — E55.9 VITAMIN D DEFICIENCY: ICD-10-CM

## 2024-11-14 LAB — VIT B1 PYROPHOSHATE BLD-SCNC: 124 NMOL/L (ref 70–180)

## 2024-11-14 PROCEDURE — 3008F BODY MASS INDEX DOCD: CPT | Performed by: SURGERY

## 2024-11-14 PROCEDURE — 3077F SYST BP >= 140 MM HG: CPT | Performed by: SURGERY

## 2024-11-14 PROCEDURE — 1125F AMNT PAIN NOTED PAIN PRSNT: CPT | Performed by: SURGERY

## 2024-11-14 PROCEDURE — 3049F LDL-C 100-129 MG/DL: CPT | Performed by: SURGERY

## 2024-11-14 PROCEDURE — 3044F HG A1C LEVEL LT 7.0%: CPT | Performed by: SURGERY

## 2024-11-14 PROCEDURE — 99214 OFFICE O/P EST MOD 30 MIN: CPT | Performed by: SURGERY

## 2024-11-14 PROCEDURE — 3079F DIAST BP 80-89 MM HG: CPT | Performed by: SURGERY

## 2024-11-14 PROCEDURE — 96372 THER/PROPH/DIAG INJ SC/IM: CPT | Performed by: SURGERY

## 2024-11-14 PROCEDURE — 1159F MED LIST DOCD IN RCRD: CPT | Performed by: SURGERY

## 2024-11-14 RX ORDER — CYANOCOBALAMIN 1000 UG/ML
1000 INJECTION, SOLUTION INTRAMUSCULAR; SUBCUTANEOUS ONCE
Status: COMPLETED | OUTPATIENT
Start: 2024-11-14 | End: 2024-11-14

## 2024-11-14 ASSESSMENT — PAIN SCALES - GENERAL: PAINLEVEL_OUTOF10: 7

## 2024-11-14 NOTE — H&P
History Of Present Illness  Heather Thurston is a 74 y.o. woman here for follow up.  She had RYGB after not tolerating adjustable gastric band.  She has gained 20 pounds over the past year.  She started on trulicity two months ago.  She takes an adult mvi, iron, B12 monthly injections, and calcium citrate once per day.  She had labwork for this visit.  Her PTH is improved but still elevated on once per day calcium citrate.  We discussed that she needs to increase calcium citrate.  Her copper is low.  She still complains of burning in between meals.       Past Medical History  She has a past medical history of Adrenal mass, Anxiety (08/26/2023), Chronic arthritis, COVID, CVA (cerebral vascular accident), DJD (degenerative joint disease), Epigastric pain (11/22/2022), Fibromyalgia, GERD (gastroesophageal reflux disease), Gout, HLD (hyperlipidemia), HTN (hypertension), Intestinal malabsorption (08/26/2023), Osteoporosis, Overactive bladder (08/26/2023), Personal history of other diseases of the musculoskeletal system and connective tissue, Plantar fasciitis of left foot, Rheumatoid arthritis of multiple sites without rheumatoid factor (Multi) (11/08/2023), Sinusitis (02/29/2024), and Thyroid nodule.    Surgical History  She has a past surgical history that includes Other surgical history (12/19/2022); Other surgical history (12/19/2022); Other surgical history (12/19/2022); Other surgical history (12/19/2022); Laparoscopic gastric banding (05/21/2007); Total knee arthroplasty; Eye surgery; and Total knee arthroplasty (2015).     Social History  She reports that she has never smoked. She has never been exposed to tobacco smoke. She has never used smokeless tobacco. She reports that she does not drink alcohol and does not use drugs.    Family History  Family History   Problem Relation Name Age of Onset    Lung cancer Mother      Liver cancer Father      Diabetes Father      Hypertension Father      Hypertension Brother    "   Obesity Brother      Diabetes Brother      Obesity Daughter      Hypertension Daughter      Diabetes Daughter          Allergies  Atropine, Codeine, Ezetimibe, Iodinated contrast media, Iodine, Lisinopril, Metformin hcl, Simvastatin, and Adhesive tape-silicones    Review of Systems   CONSTITUTIONAL:          Chills No.  Fatigue yes.  Fever No.         CARDIOLOGY:          Negative for dizziness, chest pain, palpitations, shortness of breath.         RESPIRATORY:          Sleep Apnea No.  Negative for chest congestion, cough, wheezing.         GASTROENTEROLOGY:          Food Intolerance No.  Abdominal pain DESCRIBED as stomach burning  Acid reflux yes.  Black stools No.  Constipation No.  Diarrhea yes Loss of appetite no.  Nausea YES.  Vomiting No.         UROLOGY:          Negative for dysuria, urinary urgency, kidney stones.         PSYCHOLOGY:          ADMITS TO  depression, anxiety, high stress level.       Physical Exam       General Examination:         GENERAL APPEARANCE: alert and oriented x 3, Pleasant and cooperative, No Acute Distress.          HEENT: PERRLA.          HEART: regular rate and rhythm.          LUNGS: clear to auscultation bilaterally.          CHEST: normal shape and expansion.          ABDOMEN: no hernias present, soft and not tender, no guarding, no CVA tenderness.          EXTREMITIES: pulses 2 plus bilaterally, trace bilateral edema, no ulcerations.          SKIN: normal, no rash.          NEUROLOGIC EXAM: CN's II-XII grossly intact, gait normal.          BACK: no CVA tenderness.       Last Recorded Vitals  Blood pressure 151/84, pulse 83, height 1.581 m (5' 2.25\"), weight 68.9 kg (152 lb).    Relevant Results   Latest Reference Range & Units 11/07/24 15:18   GLUCOSE 74 - 99 mg/dL 142 (H)   SODIUM 136 - 145 mmol/L 138   POTASSIUM 3.5 - 5.3 mmol/L 4.2   CHLORIDE 98 - 107 mmol/L 103   Bicarbonate 21 - 32 mmol/L 32   Anion Gap 10 - 20 mmol/L 7 (L)   Blood Urea Nitrogen 6 - 23 mg/dL 10 "   Creatinine 0.50 - 1.05 mg/dL 0.66   EGFR >60 mL/min/1.73m*2 >90   Calcium 8.6 - 10.3 mg/dL 8.9   Albumin 3.4 - 5.0 g/dL 4.2   Alkaline Phosphatase 33 - 136 U/L 32 (L)   ALT 7 - 45 U/L 10   AST 9 - 39 U/L 18   Bilirubin Total 0.0 - 1.2 mg/dL 0.4   FERRITIN 8 - 150 ng/mL 178 (H)   FOLATE >5.0 ng/mL 16.6   Total Protein 6.4 - 8.2 g/dL 6.2 (L)   IRON 35 - 150 ug/dL 108   TIBC 240 - 445 ug/dL 388   UIBC 110 - 370 ug/dL 280   % Saturation 25 - 45 % 28   Vitamin B12 211 - 911 pg/mL 964 (H)   Copper 80.0 - 155.0 ug/dL 75.6 (L)   Zinc, Serum or Plasma 60.0 - 120.0 ug/dL 67.9   Parathyroid Hormone, Intact 18.5 - 88.0 pg/mL 169.7 (H)   Vitamin D, 25-Hydroxy, Total 30 - 100 ng/mL 32   WBC 4.4 - 11.3 x10*3/uL 5.1   nRBC 0.0 - 0.0 /100 WBCs 0.0   RBC 4.00 - 5.20 x10*6/uL 4.56   HEMOGLOBIN 12.0 - 16.0 g/dL 13.0   HEMATOCRIT 36.0 - 46.0 % 41.1   MCV 80 - 100 fL 90   MCH 26.0 - 34.0 pg 28.5   MCHC 32.0 - 36.0 g/dL 31.6 (L)   RED CELL DISTRIBUTION WIDTH 11.5 - 14.5 % 15.6 (H)   Platelets 150 - 450 x10*3/uL 198   Neutrophils % 40.0 - 80.0 % 47.0   Immature Granulocytes %, Automated 0.0 - 0.9 % 0.2   Lymphocytes % 13.0 - 44.0 % 36.6   Monocytes % 2.0 - 10.0 % 10.5   Eosinophils % 0.0 - 6.0 % 4.5   Basophils % 0.0 - 2.0 % 1.2   Neutrophils Absolute 1.60 - 5.50 x10*3/uL 2.38   Immature Granulocytes Absolute, Automated 0.00 - 0.50 x10*3/uL 0.01   Lymphocytes Absolute 0.80 - 3.00 x10*3/uL 1.85   Monocytes Absolute 0.05 - 0.80 x10*3/uL 0.53   Eosinophils Absolute 0.00 - 0.40 x10*3/uL 0.23   Basophils Absolute 0.00 - 0.10 x10*3/uL 0.06   (H): Data is abnormally high  (L): Data is abnormally low         Assessment/Plan   Problem List Items Addressed This Visit             ICD-10-CM    Gastroesophageal reflux disease K21.9    Hyperparathyroidism (Multi) E21.3    Vitamin B 12 deficiency E53.8    Vitamin D deficiency E55.9    History of gastric bypass Z98.84    Intestinal malabsorption K90.9    Copper deficiency - Primary E61.0     We reviewed  the rules necessary for long term success after weight loss surgery.  We discussed the need for lifelong nutritional supplementation after weight loss surgery and they were given a handout.  We will obtain labwork at next visit.  We discussed the importance of excercising with moderate intensity a minimum of five days per week for at least 30 minutes.  I asked Heather to add a copper supplement.  We again discussed the need to increase her calcium citrate to 500-600mg tid.       Kevin Walker MD

## 2024-11-14 NOTE — PROGRESS NOTES
Dietary follow up    Current Weight: 152 lbs  Current BMI: 27.58    Heather is present in the office for follow up d/t labs. She had a RYGB in 2018. She reports feeling a burning sensation between meals. She reports not being able to tolerate capsule vitamins so she is currently taking a gummy MVI, a gummy vitamin B12 and calcium citrate 1x/day.   She reports a diet recall of:   Breakfast- an egg and a piece of fruit, or oatmeal made with water   Lunch- half a tuna/lunch meat sandwich, or a bowl of soup   Dinner- corn beef and cabbage   Snacks: pretzels to soothe burning sensation, an apple on occasion   Beverages: sugar free snapple   Alcohol Intake: none     Heather's labs show an elevated PTH value - I instructed her to increase calcium citrate to 2-3x/day. Her labs also show a low copper level. I instructed Heather to begin to take an OTC copper supplement as directed on the bottle.     Plan: Heather will follow up in 3 months to recheck labs.       Lisa Dasilva RD, LDN  Registered Dietitian, Licensed Dietitian Nutritionist

## 2024-11-15 ENCOUNTER — TELEPHONE (OUTPATIENT)
Dept: SURGERY | Facility: CLINIC | Age: 74
End: 2024-11-15
Payer: MEDICARE

## 2024-11-15 PROCEDURE — RXMED WILLOW AMBULATORY MEDICATION CHARGE

## 2024-11-15 NOTE — TELEPHONE ENCOUNTER
Attempted to reach Heather to discuss the addition of a copper supplement. Pt did not answer - LVM.

## 2024-11-20 ENCOUNTER — PHARMACY VISIT (OUTPATIENT)
Dept: PHARMACY | Facility: CLINIC | Age: 74
End: 2024-11-20
Payer: COMMERCIAL

## 2024-11-21 ENCOUNTER — APPOINTMENT (OUTPATIENT)
Dept: INFUSION THERAPY | Facility: CLINIC | Age: 74
End: 2024-11-21
Payer: MEDICARE

## 2024-11-21 VITALS
OXYGEN SATURATION: 97 % | DIASTOLIC BLOOD PRESSURE: 80 MMHG | HEART RATE: 90 BPM | SYSTOLIC BLOOD PRESSURE: 143 MMHG | RESPIRATION RATE: 18 BRPM | TEMPERATURE: 98.5 F

## 2024-11-21 DIAGNOSIS — M06.09 RHEUMATOID ARTHRITIS OF MULTIPLE SITES WITHOUT RHEUMATOID FACTOR (MULTI): ICD-10-CM

## 2024-11-21 PROCEDURE — 96372 THER/PROPH/DIAG INJ SC/IM: CPT | Performed by: NURSE PRACTITIONER

## 2024-11-21 RX ORDER — ALBUTEROL SULFATE 0.83 MG/ML
3 SOLUTION RESPIRATORY (INHALATION) AS NEEDED
OUTPATIENT
Start: 2024-12-19

## 2024-11-21 RX ORDER — EPINEPHRINE 0.3 MG/.3ML
0.3 INJECTION SUBCUTANEOUS EVERY 5 MIN PRN
OUTPATIENT
Start: 2024-12-19

## 2024-11-21 RX ORDER — FAMOTIDINE 10 MG/ML
20 INJECTION INTRAVENOUS ONCE AS NEEDED
OUTPATIENT
Start: 2024-12-19

## 2024-11-21 RX ORDER — DIPHENHYDRAMINE HYDROCHLORIDE 50 MG/ML
50 INJECTION INTRAMUSCULAR; INTRAVENOUS AS NEEDED
OUTPATIENT
Start: 2024-12-19

## 2024-11-21 ASSESSMENT — ENCOUNTER SYMPTOMS
ADENOPATHY: 0
SHORTNESS OF BREATH: 0
COUGH: 0
ARTHRALGIAS: 1
FATIGUE: 1
WOUND: 1
LIGHT-HEADEDNESS: 0
NUMBNESS: 0
EXTREMITY WEAKNESS: 0
DIZZINESS: 0
LEG SWELLING: 0
PALPITATIONS: 0
WHEEZING: 0

## 2024-11-21 ASSESSMENT — PAIN SCALES - GENERAL: PAINLEVEL_OUTOF10: 8

## 2024-11-21 NOTE — PATIENT INSTRUCTIONS
Today :We administered certolizumab pegol.     For:   1. Rheumatoid arthritis of multiple sites without rheumatoid factor (Multi)         Your next appointment is due in:  12/19/2024 for cimzia        Please read the  Medication Guide that was given to you and reviewed during todays visit.     (Tell all doctors including dentists that you are taking this medication)     Go to the emergency room or call 911 if:  -You have signs of allergic reaction:   -Rash, hives, itching.   -Swollen, blistered, peeling skin.   -Swelling of face, lips, mouth, tongue or throat.   -Tightness of chest, trouble breathing, swallowing or talking     Call your doctor:  - If IV / injection site gets red, warm, swollen, itchy or leaks fluid or pus.     (Leave dressing on your IV site for at least 2 hours and keep area clean and dry  - If you get sick or have symptoms of infection or are not feeling well for any reason.    (Wash your hands often, stay away from people who are sick)  - If you have side effects from your medication that do not go away or are bothersome.     (Refer to the teaching your nurse gave you for side effects to call your doctor about)    - Common side effects may include:  stuffy nose, headache, feeling tired, muscle aches, upset stomach  - Before receiving any vaccines     - Call the [facility] at   If:  - You get sick, are on antibiotics, have had a recent vaccine, have surgery or dental work and your doctor wants your visit rescheduled.  - You need to cancel and reschedule your visit for any reason. Call at least 2 days before your visit if you need to cancel.   - Your insurance changes before your next visit.    (We will need to get approval from your new insurance. This can take up to two weeks.)     The [facility] is opened Monday thru Friday. We are closed on weekends and holidays.   Voice mail will take your call if the center is closed. If you leave a message please allow 24  hours for a call back during weekdays. If you leave a message on a weekend/holiday, we will call you back the next business day.    A pharmacist is available Monday - Friday from 8:30AM to 3:30PM to help answer any questions you may have about your prescriptions(s). Please call pharmacy at:    [facility]: [phone]  [facility]: [phone]  [facility]: [phone]

## 2024-11-21 NOTE — PROGRESS NOTES
[facility]   Infusion Clinic Note   Date: 2024   Name: Heather Thurston  : 1950   MRN: [id number]          Reason for Visit: Injections (Cimzia)         Today: We administered certolizumab pegol.       Visit Type: INFUSION       Ordered By: Dr Chowdhury       Accompanied by:Self       Diagnosis: Rheumatoid arthritis of multiple sites without rheumatoid factor (Multi)        Allergies:   Allergies as of 2024 - Reviewed 2024   Allergen Reaction Noted    Atropine Anaphylaxis, Nausea And Vomiting, Palpitations, and Rash 2023    Codeine Itching and Unknown 2023    Ezetimibe Unknown 2023    Iodinated contrast media Other and Syncope 2023    Iodine Nausea/vomiting 10/16/2019    Lisinopril Cough 2023    Metformin hcl Diarrhea 2023    Simvastatin Unknown 2023    Adhesive tape-silicones Rash 2023          Current Medications has a current medication list which includes the following prescription(s): acetaminophen, amitriptyline, ascorbic acid, calcium carbonate, calcium citrate, cimzia, cholecalciferol, cyanocobalamin, prolia, dicyclomine, trulicity, trulicity, famotidine, gabapentin, leflunomide, multivit-minerals/folic acid, omeprazole, oxybutynin, sertraline, sulfasalazine, and tramadol.       Vitals:   Vitals:    24 1106   BP: 143/80   Pulse: 90   Resp: 18   Temp: 36.9 °C (98.5 °F)   SpO2: 97%   PainSc:   8   PainLoc: Arm  Comment: right             Infusion Pre-procedure Checklist:   - Allergies reviewed: yes   - Medications reviewed: yes       - Previous reaction to current treatment: no      Assess patient for the concerns below. Document provider notification as appropriate.  - Active or recent infection with/without current antibiotic use: no  - Recent or planned invasive dental work: no  - Recent or planned surgeries: no  - Recently received or plans to receive vaccinations: no  - Has treatment related  "toxicities: no  - Is pregnant:  n/a      Pain: 8   - Is the pain different from normal: no   - Is your Doctor aware:  n/a       Labs: N/A          Fall Risk Screening: More Fall Risk  History of Falling, Immediate or Within 3 Months: No  Secondary Diagnosis: No  Ambulatory Aid: Walks without aid/bedrest/nurse assist  Intravenous Therapy/Heparin Lock: No  Gait/Transferring: Normal/bedrest/immobile  Mental Status: Oriented to own ability  More Fall Risk Score: 0       Review Of Systems:  Review of Systems   Constitutional:  Positive for fatigue.   Respiratory:  Negative for cough, shortness of breath and wheezing.    Cardiovascular:  Negative for chest pain, leg swelling and palpitations.   Musculoskeletal:  Positive for arthralgias (States right arm and shoulder/elbow are \"flared up\" Using heat, ice and repositioning at home. Instructed to call Dr if pain not improving).   Skin:  Positive for wound (skin tear to left hand dry and scabbed over). Negative for itching and rash.   Neurological:  Negative for dizziness, extremity weakness, light-headedness and numbness.   Hematological:  Negative for adenopathy.         ROS completed? Yes      Infusion Readiness:  - Assessment Concerns Related to Infusion: Yes  - Provider notified: n/a      Document Below Only If Indicated:   New Patient Education:    N/A (returning patient for continuation of therapy. Ongoing education provided as needed.)        Treatment Conditions & Drug Specific Questions:    Certolizumab Pegol  (CIMZIA)    (Unless otherwise specified on patient specific therapy plan):     TREATMENT CONDITIONS:  Unless otherwise specified on patient specific therapy plan HOLD treatment and notify provider prior to proceeding with today's infusion if patient with a:  o Positive Hepatitis B Surface Ag  o Positive T-Spot  o NO baseline CBC-D obtained  o NO baseline CMP obtained    Hep B Sag completed on 08/29/2023 - NONREACTIVE    No results found for: \"HAGCN\", " "\"HEPBSURABI\", \"HBSAG\", \"XHAGF\", \"HEPBSAG\", \"EXTHEPBSAG\", \"NONUHSWGH\"   No results found for: \"NONUHFIRE\", \"NONUHSWGH\", \"NONUHFISH\", \"EXTHEPBSAG\"  Lab Results   Component Value Date    TBSIN Negative 07/30/2024      Lab Results   Component Value Date    WBC 5.1 11/07/2024    HGB 13.0 11/07/2024    HCT 41.1 11/07/2024    MCV 90 11/07/2024     11/07/2024        Chemistry    Lab Results   Component Value Date/Time     11/07/2024 1518    K 4.2 11/07/2024 1518     11/07/2024 1518    CO2 32 11/07/2024 1518    BUN 10 11/07/2024 1518    CREATININE 0.66 11/07/2024 1518    Lab Results   Component Value Date/Time    CALCIUM 8.9 11/07/2024 1518    ALKPHOS 32 (L) 11/07/2024 1518    AST 18 11/07/2024 1518    ALT 10 11/07/2024 1518    BILITOT 0.4 11/07/2024 1518             Labs reviewed and patient meets treatment conditions? Yes    DRUG SPECIFIC QUESTIONS:  - Latex Allergy? No     (Caution: Prefilled syringe may include Latex)    - Any new or worsening signs / symptoms of heart failure which may include things like worsening shortness of breath, swelling, fatigue? No   (If yes notify provider before proceeding with today's infusion. New onset or worsening HF have been reported)      REMINDERS:  Recommended Vitals/Observation:  Vitals: Obtain vitals prior to injection.  Observation: Patient may leave immediately following injection.        Weight Based Drug Calculations:    WEIGHT BASED DRUGS: NOT APPLICABLE / FLAT DOSE          Initiated By: ROD Wall-CNP        "

## 2024-12-10 PROCEDURE — RXMED WILLOW AMBULATORY MEDICATION CHARGE

## 2024-12-11 ENCOUNTER — OFFICE VISIT (OUTPATIENT)
Dept: PRIMARY CARE | Facility: CLINIC | Age: 74
End: 2024-12-11
Payer: MEDICARE

## 2024-12-11 VITALS
DIASTOLIC BLOOD PRESSURE: 72 MMHG | TEMPERATURE: 97.4 F | SYSTOLIC BLOOD PRESSURE: 109 MMHG | BODY MASS INDEX: 27.6 KG/M2 | WEIGHT: 150 LBS | HEIGHT: 62 IN | OXYGEN SATURATION: 97 % | HEART RATE: 63 BPM

## 2024-12-11 DIAGNOSIS — M06.09 RHEUMATOID ARTHRITIS OF MULTIPLE SITES WITHOUT RHEUMATOID FACTOR (MULTI): ICD-10-CM

## 2024-12-11 DIAGNOSIS — E78.2 MIXED HYPERLIPIDEMIA: ICD-10-CM

## 2024-12-11 DIAGNOSIS — Z86.2 HISTORY OF ANEMIA: ICD-10-CM

## 2024-12-11 DIAGNOSIS — I69.851 HEMIPLEGIA AND HEMIPARESIS FOLLOWING OTHER CEREBROVASCULAR DISEASE AFFECTING RIGHT DOMINANT SIDE (MULTI): ICD-10-CM

## 2024-12-11 DIAGNOSIS — E55.9 VITAMIN D DEFICIENCY: ICD-10-CM

## 2024-12-11 DIAGNOSIS — Z76.89 ENCOUNTER TO ESTABLISH CARE WITH NEW DOCTOR: Primary | ICD-10-CM

## 2024-12-11 DIAGNOSIS — Z12.31 ENCOUNTER FOR SCREENING MAMMOGRAM FOR BREAST CANCER: ICD-10-CM

## 2024-12-11 DIAGNOSIS — R73.9 HYPERGLYCEMIA: ICD-10-CM

## 2024-12-11 DIAGNOSIS — E11.65 TYPE 2 DIABETES MELLITUS WITH HYPERGLYCEMIA, WITHOUT LONG-TERM CURRENT USE OF INSULIN: ICD-10-CM

## 2024-12-11 DIAGNOSIS — K21.9 GASTROESOPHAGEAL REFLUX DISEASE, UNSPECIFIED WHETHER ESOPHAGITIS PRESENT: ICD-10-CM

## 2024-12-11 LAB — POC HEMOGLOBIN A1C: 5.8 % (ref 4.2–6.5)

## 2024-12-11 PROCEDURE — 3074F SYST BP LT 130 MM HG: CPT | Performed by: INTERNAL MEDICINE

## 2024-12-11 PROCEDURE — 83036 HEMOGLOBIN GLYCOSYLATED A1C: CPT | Performed by: INTERNAL MEDICINE

## 2024-12-11 PROCEDURE — 3078F DIAST BP <80 MM HG: CPT | Performed by: INTERNAL MEDICINE

## 2024-12-11 PROCEDURE — 3049F LDL-C 100-129 MG/DL: CPT | Performed by: INTERNAL MEDICINE

## 2024-12-11 PROCEDURE — 3044F HG A1C LEVEL LT 7.0%: CPT | Performed by: INTERNAL MEDICINE

## 2024-12-11 PROCEDURE — G2211 COMPLEX E/M VISIT ADD ON: HCPCS | Performed by: INTERNAL MEDICINE

## 2024-12-11 PROCEDURE — 99214 OFFICE O/P EST MOD 30 MIN: CPT | Mod: 25 | Performed by: INTERNAL MEDICINE

## 2024-12-11 PROCEDURE — 1125F AMNT PAIN NOTED PAIN PRSNT: CPT | Performed by: INTERNAL MEDICINE

## 2024-12-11 PROCEDURE — 1159F MED LIST DOCD IN RCRD: CPT | Performed by: INTERNAL MEDICINE

## 2024-12-11 PROCEDURE — 3008F BODY MASS INDEX DOCD: CPT | Performed by: INTERNAL MEDICINE

## 2024-12-11 PROCEDURE — 99214 OFFICE O/P EST MOD 30 MIN: CPT | Performed by: INTERNAL MEDICINE

## 2024-12-11 PROCEDURE — 90662 IIV NO PRSV INCREASED AG IM: CPT | Performed by: INTERNAL MEDICINE

## 2024-12-11 ASSESSMENT — PAIN SCALES - GENERAL: PAINLEVEL_OUTOF10: 7

## 2024-12-11 NOTE — PROGRESS NOTES
[facility]: [facility]  PROGRESS NOTE      Heather Thurston is a 74 y.o. female that is presenting today for New Patient Visit (NP [facility]).    Assessment/Plan   Diagnoses and all orders for this visit:  Encounter to establish care with new doctor      - Reviewed patient's available records, discussed PMH, Current active problems Meds and allergies.  Type 2 diabetes mellitus with hyperglycemia, without long-term current use of insulin  -     POCT glycosylated hemoglobin (Hb A1C) manually resulted 5.8      Under control with current treatment / Under endo care.  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 and Anti-Reflux Measures           Start Pepcid AC 20 mg twice a day  Mixed hyperlipidemia  -     Lipid Panel; Future  Hyperglycemia  -     Hemoglobin A1C; Future  Vitamin D deficiency  -     Vitamin D 25-Hydroxy,Total (for eval of Vitamin D levels); Future  Encounter for screening mammogram for breast cancer  -     BI mammo bilateral screening tomosynthesis; Future  History of anemia  -     CBC and Auto Differential; Future  Other orders  -     Flu vaccine, trivalent, preservative free, HIGH-DOSE, age 65y+ (Fluzone)  -     Follow Up In Primary Care; Future  Subjective   HPI    - Heather Thurston 74 y.o. female is here today to establish care (TE),      Issues w her stomach / sees Rhem ([place])        - Patient denies any other symptoms or concerns at this time.       - patient denies any adverse reactions to or concerns with his/her meds.       - Problem list and medication reconciliation done  today.  - V.S. Stable. No changes at this time.  - Encouraged continued diet and exercise modification.     Review of Systems   All pertinent POSITIVES as noted per HPI.  All other systems have been reviewed and are NEGATIVE and /or Noncontributory to this patient current visit or complaint.   Objective   Vitals:    12/11/24 1111   BP: 109/72   Pulse: 63   Temp: 36.3 °C (97.4 °F)   SpO2: 97%      Body mass index is 27.22 kg/m².  Physical Exam  Vitals and nursing note reviewed.   Constitutional:       Appearance: Normal appearance.   HENT:      Head: Normocephalic and atraumatic.   Neck:      Vascular: No carotid bruit.   Cardiovascular:      Rate and Rhythm: Normal rate and regular rhythm.      Pulses: Normal pulses.      Heart sounds: Normal heart sounds.   Pulmonary:      Effort: Pulmonary effort is normal.      Breath sounds: Normal breath sounds.   Abdominal:      General: Abdomen is flat. Bowel sounds are normal.      Palpations: Abdomen is soft.      Tenderness: There is no abdominal tenderness.   Musculoskeletal:         General: No swelling. Normal range of motion.      Cervical back: Neck supple.   Lymphadenopathy:      Cervical: No cervical adenopathy.   Skin:     General: Skin is warm and dry.   Neurological:      Mental Status: She is alert.      Comments: Rt. hemiparesis   Psychiatric:         Mood and Affect: Mood normal.     Diagnostic Results     Lab Results   Component Value Date    ALT 10 11/07/2024    AST 18 11/07/2024    ALKPHOS 32 (L) 11/07/2024    BILITOT 0.4 11/07/2024     Lab Results   Component Value Date    WBC 5.1 11/07/2024    HGB 13.0 11/07/2024    HCT 41.1 11/07/2024    MCV 90 11/07/2024     11/07/2024     Lab Results   Component Value Date    CHOL 184 07/08/2024    CHOL 164 07/21/2022    CHOL 150 07/22/2021     Lab Results   Component Value Date    HDL 59.0 07/08/2024    HDL 59 07/21/2022    HDL 63 07/22/2021     Lab Results   Component Value Date    LDLCALC 101 07/08/2024     "LDLCALC 91 07/21/2022    LDLCALC 74 07/22/2021     Lab Results   Component Value Date    TRIG 118 07/08/2024    TRIG 70 07/21/2022    TRIG 66 07/22/2021     No components found for: \"CHOLHDL\"  Lab Results   Component Value Date    HGBA1C 6.8 (H) 07/08/2024     Other labs not included in the list above were reviewed either before or during this encounter.    History    Past Medical History:   Diagnosis Date    Adrenal mass     '05 to '14 no change by CT    Anxiety 08/26/2023    Chronic arthritis     tramadol prn    COVID     CVA (cerebral vascular accident)     DJD (degenerative joint disease)     Epigastric pain 11/22/2022    Fibromyalgia     GERD (gastroesophageal reflux disease)     Gout     HLD (hyperlipidemia)     HTN (hypertension)     htn ecg nsr    Intestinal malabsorption 08/26/2023    Osteoporosis     DEXA OSTEOPOROSIS pROLIA    Overactive bladder 08/26/2023    Personal history of other diseases of the musculoskeletal system and connective tissue     History of rheumatoid arthritis    Plantar fasciitis of left foot     Rheumatoid arthritis of multiple sites without rheumatoid factor (Multi) 11/08/2023    Sinusitis 02/29/2024    Thyroid nodule     10/22  ENT  eval neg.     Past Surgical History:   Procedure Laterality Date    EYE SURGERY      LOWER EYELID, LID RAISE    LAPAROSCOPIC GASTRIC BANDING  05/21/2007    LAPAROSCOPIC ADJUSTABLE GASTRIC BAND UTILIZING 11 CM VANGUARD BAND, ENTEROLYSIS ([facility])    OTHER SURGICAL HISTORY  12/19/2022    Knee replacement    OTHER SURGICAL HISTORY  12/19/2022    Carpal tunnel surgery    OTHER SURGICAL HISTORY  12/19/2022    Cholecystectomy    OTHER SURGICAL HISTORY  12/19/2022    Hysterectomy    TOTAL KNEE ARTHROPLASTY      LEFT TOTAL KNEE ARTHROPLASTY (DR. DOUGLAS)    TOTAL KNEE ARTHROPLASTY  2015    RT TKA (DR. HARRIS)     Family History   Problem Relation Name Age of Onset    Lung cancer Mother      Liver cancer Father      Diabetes Father      Hypertension " Father      Hypertension Brother      Obesity Brother      Diabetes Brother      Obesity Daughter      Hypertension Daughter      Diabetes Daughter       Social History     Socioeconomic History    Marital status:      Spouse name: Not on file    Number of children: Not on file    Years of education: Not on file    Highest education level: Not on file   Occupational History    Not on file   Tobacco Use    Smoking status: Never     Passive exposure: Never    Smokeless tobacco: Never   Vaping Use    Vaping status: Never Used   Substance and Sexual Activity    Alcohol use: Never    Drug use: Never    Sexual activity: Not on file   Other Topics Concern    Not on file   Social History Narrative    Not on file     Social Drivers of Health     Financial Resource Strain: Not on file   Food Insecurity: Not on file   Transportation Needs: Not on file   Physical Activity: Not on file   Stress: Not on file   Social Connections: Not on file   Intimate Partner Violence: Not At Risk (3/21/2024)    Humiliation, Afraid, Rape, and Kick questionnaire     Fear of Current or Ex-Partner: No     Emotionally Abused: No     Physically Abused: No     Sexually Abused: No   Housing Stability: Not on file     Allergies   Allergen Reactions    Atropine Anaphylaxis, Nausea And Vomiting, Palpitations and Rash     HEART RACING    Codeine Itching and Unknown    Ezetimibe Unknown    Iodinated Contrast Media Other and Syncope     SYNCOPE    NAUSEA    VOMITING    Iodine Nausea/vomiting    Lisinopril Cough    Metformin Hcl Diarrhea    Simvastatin Unknown    Adhesive Tape-Silicones Rash     Current Outpatient Medications on File Prior to Visit   Medication Sig Dispense Refill    acetaminophen (Tylenol 8 HOUR) 650 mg ER tablet Take 1 tablet (650 mg) by mouth every 8 hours if needed for mild pain (1 - 3). Do not crush, chew, or split.  Tylenol arthritis takes 2 a day      amitriptyline (Elavil) 10 mg tablet Take 2 tablets (20 mg) by mouth once daily  at bedtime.      ascorbic acid (Vitamin C) 250 MG chewable tablet Chew 1 tablet (250 mg). Gummies dose unknown      calcium carbonate (Tums) 200 mg calcium chewable tablet Chew 1 tablet (500 mg) once daily.      CALCIUM CITRATE ORAL Take 500 mg by mouth 2 times a day.      certolizumab pegol (Cimzia) injection Inject 2 mL (400 mg) under the skin every 28 (twenty-eight) days. 2 mL 11    cholecalciferol (Vitamin D3) 1,250 mcg (50,000 unit) tablet Take 1 capsule by mouth 1 (one) time per week.      cyanocobalamin (Vitamin B-12) 1,000 mcg/mL injection Inject into the shoulder, thigh, or buttocks every 30 (thirty) days.      denosumab (Prolia) 60 mg/mL syringe Inject 1 mL (60 mg total) under the skin every 6 months.  As directed 1 mL 1    dicyclomine (Bentyl) 10 mg capsule Take 1 capsule (10 mg) by mouth 3 times a day.      dulaglutide (Trulicity) 1.5 mg/0.5 mL pen injector injection Inject 1.5 mg under the skin 1 (one) time per week. 2 mL 11    famotidine (Pepcid) 10 mg tablet Take 1 tablet (10 mg) by mouth once daily as needed for heartburn.      gabapentin (Neurontin) 300 mg capsule Take 1 capsule (300 mg) by mouth 3 times a day. 270 capsule 1    leflunomide (Arava) 20 mg tablet TAKE 1 TABLET(20 MG) BY MOUTH DAILY 90 tablet 1    multivit-minerals/folic acid (ADULT ONE DAILY MULTIVITAMIN ORAL) Take by mouth.      omeprazole (PriLOSEC) 40 mg DR capsule Take 1 capsule (40 mg) by mouth 2 times a day before meals. 180 capsule 1    oxybutynin (Ditropan) 5 mg tablet TAKE 1 TABLET BY MOUTH ONCE  DAILY 90 tablet 0    sertraline (Zoloft) 50 mg tablet Take 1 tablet (50 mg) by mouth once daily. 90 tablet 3    sulfaSALAzine (Azulfidine) 500 mg tablet Take 1 tablet (500 mg) by mouth 2 times a day.      traMADol (Ultram) 50 mg tablet Take 1 tablet (50 mg) by mouth once daily as needed for moderate pain (4 - 6) (OA/RA). 90 tablet 0    dulaglutide (Trulicity) 0.75 mg/0.5 mL pen injector Inject 0.75 mg under the skin 1 (one) time per  week. 2 mL 3     No current facility-administered medications on file prior to visit.     Immunization History   Administered Date(s) Administered    Flu vaccine, quadrivalent, high-dose, preservative free, age 65y+ (FLUZONE) 10/21/2020, 10/21/2021, 12/05/2023    Flu vaccine, trivalent, preservative free, HIGH-DOSE, age 65y+ (Fluzone) 10/07/2016, 11/14/2018, 01/08/2020    Hepatitis B vaccine, adult *Check Product/Dose* 10/11/2005, 12/13/2005, 08/25/2006    Influenza, Seasonal, Quadrivalent, Adjuvanted 10/18/2022    Influenza, injectable, quadrivalent 11/14/2017    Influenza, seasonal, injectable 11/01/2008, 01/04/2011, 10/19/2011, 10/01/2014    Influenza, trivalent, adjuvanted 10/05/2020, 10/20/2020, 10/22/2021    Novel influenza-H1N1-09, preservative-free 01/06/2010    Pfizer COVID-19 vaccine, bivalent, age 12 years and older (30 mcg/0.3 mL) 10/24/2022    Pfizer Purple Cap SARS-CoV-2 03/08/2021, 03/29/2021, 10/22/2021    Pneumococcal conjugate vaccine, 13-valent (PREVNAR 13) 09/01/2015    Pneumococcal conjugate vaccine, 20-valent (PREVNAR 20) 10/18/2022    Pneumococcal polysaccharide vaccine, 23-valent, age 2 years and older (PNEUMOVAX 23) 01/01/2002, 01/04/2011    Tdap vaccine, age 7 year and older (BOOSTRIX, ADACEL) 01/08/2020    Zoster vaccine, recombinant, adult (SHINGRIX) 11/01/2022    Zoster, live 09/07/2012     Patient's medical history was reviewed and updated either before or during this encounter.       Nina Queen MD

## 2024-12-12 ENCOUNTER — PHARMACY VISIT (OUTPATIENT)
Dept: PHARMACY | Facility: CLINIC | Age: 74
End: 2024-12-12
Payer: COMMERCIAL

## 2024-12-12 DIAGNOSIS — N32.81 OVERACTIVE BLADDER: ICD-10-CM

## 2024-12-14 RX ORDER — OXYBUTYNIN CHLORIDE 5 MG/1
5 TABLET ORAL DAILY
Qty: 90 TABLET | Refills: 2 | Status: SHIPPED | OUTPATIENT
Start: 2024-12-14

## 2024-12-15 NOTE — PATIENT INSTRUCTIONS
GERD   - Anti-Reflux Measures: 1. Loose weight, 2. Elevate the head of the bed 6-8 inches, 3. AVOID: Caffeine, Chocolate, Alcohol, Peppermint, Fatty/Fried food, Citrus fruits, Tomato sauce and Spicy food, 4. Stop smoking, 5. Don't lie down after a meal ( at least not before 3 hours, 6. Don't overeat, 7. Avoid tight or fitted cloth.    Pepcid AC 20 mg twice a day

## 2024-12-16 ENCOUNTER — APPOINTMENT (OUTPATIENT)
Dept: SURGERY | Facility: CLINIC | Age: 74
End: 2024-12-16
Payer: MEDICARE

## 2024-12-16 DIAGNOSIS — E53.8 VITAMIN B 12 DEFICIENCY: Primary | ICD-10-CM

## 2024-12-16 DIAGNOSIS — K90.9 INTESTINAL MALABSORPTION, UNSPECIFIED TYPE (HHS-HCC): ICD-10-CM

## 2024-12-16 PROCEDURE — 96372 THER/PROPH/DIAG INJ SC/IM: CPT

## 2024-12-16 PROCEDURE — 3044F HG A1C LEVEL LT 7.0%: CPT

## 2024-12-16 PROCEDURE — 3049F LDL-C 100-129 MG/DL: CPT

## 2024-12-16 PROCEDURE — 1036F TOBACCO NON-USER: CPT

## 2024-12-16 PROCEDURE — 1126F AMNT PAIN NOTED NONE PRSNT: CPT

## 2024-12-16 RX ORDER — CYANOCOBALAMIN 1000 UG/ML
1000 INJECTION, SOLUTION INTRAMUSCULAR; SUBCUTANEOUS ONCE
Status: COMPLETED | OUTPATIENT
Start: 2024-12-16 | End: 2024-12-16

## 2024-12-16 ASSESSMENT — ENCOUNTER SYMPTOMS
OCCASIONAL FEELINGS OF UNSTEADINESS: 0
LOSS OF SENSATION IN FEET: 0
DEPRESSION: 0

## 2024-12-16 ASSESSMENT — PAIN SCALES - GENERAL: PAINLEVEL_OUTOF10: 0-NO PAIN

## 2024-12-19 ENCOUNTER — APPOINTMENT (OUTPATIENT)
Dept: INFUSION THERAPY | Facility: CLINIC | Age: 74
End: 2024-12-19
Payer: MEDICARE

## 2024-12-19 VITALS
HEART RATE: 79 BPM | DIASTOLIC BLOOD PRESSURE: 77 MMHG | BODY MASS INDEX: 27.71 KG/M2 | TEMPERATURE: 97.6 F | SYSTOLIC BLOOD PRESSURE: 133 MMHG | RESPIRATION RATE: 16 BRPM | WEIGHT: 152.7 LBS | OXYGEN SATURATION: 92 %

## 2024-12-19 DIAGNOSIS — M06.09 RHEUMATOID ARTHRITIS OF MULTIPLE SITES WITHOUT RHEUMATOID FACTOR (MULTI): ICD-10-CM

## 2024-12-19 PROCEDURE — 96372 THER/PROPH/DIAG INJ SC/IM: CPT | Performed by: NURSE PRACTITIONER

## 2024-12-19 RX ORDER — DIPHENHYDRAMINE HYDROCHLORIDE 50 MG/ML
50 INJECTION INTRAMUSCULAR; INTRAVENOUS AS NEEDED
OUTPATIENT
Start: 2025-01-16

## 2024-12-19 RX ORDER — FAMOTIDINE 10 MG/ML
20 INJECTION INTRAVENOUS ONCE AS NEEDED
OUTPATIENT
Start: 2025-01-16

## 2024-12-19 RX ORDER — ALBUTEROL SULFATE 0.83 MG/ML
3 SOLUTION RESPIRATORY (INHALATION) AS NEEDED
OUTPATIENT
Start: 2025-01-16

## 2024-12-19 RX ORDER — EPINEPHRINE 0.3 MG/.3ML
0.3 INJECTION SUBCUTANEOUS EVERY 5 MIN PRN
OUTPATIENT
Start: 2025-01-16

## 2024-12-19 ASSESSMENT — ENCOUNTER SYMPTOMS
WOUND: 0
LEG SWELLING: 0
LIGHT-HEADEDNESS: 0
WHEEZING: 0
SHORTNESS OF BREATH: 0
NUMBNESS: 0
PALPITATIONS: 0
EXTREMITY WEAKNESS: 0
DIZZINESS: 0
COUGH: 0

## 2024-12-19 NOTE — PATIENT INSTRUCTIONS
Today :We administered certolizumab pegol. Monthly Cimzia 400mg (total) 200mg subcutaneous injection 200mg in the left lower abdomen  200mg in the right lower abdomen    For:   1. Rheumatoid arthritis of multiple sites without rheumatoid factor (Multi)       Your next appointment is due in:  1 month        Please read the  Medication Guide that was given to you and reviewed during todays visit.     (Tell all doctors including dentists that you are taking this medication)     Go to the emergency room or call 911 if:  -You have signs of allergic reaction:   -Rash, hives, itching.   -Swollen, blistered, peeling skin.   -Swelling of face, lips, mouth, tongue or throat.   -Tightness of chest, trouble breathing, swallowing or talking     Call your doctor:  - If injection sites gets red, warm, swollen, itchy or leaks fluid or pus.     (Leave band aids on your injection sites for at least 2 hours and keep area clean and dry  - If you get sick or have symptoms of infection or are not feeling well for any reason.    (Wash your hands often, stay away from people who are sick)  - If you have side effects from your medication that do not go away or are bothersome.     (Refer to the teaching your nurse gave you for side effects to call your doctor about)    - Common side effects may include:  stuffy nose, headache, feeling tired, muscle aches, upset stomach  - Before receiving any vaccines     - [facility] at   If:  - You get sick, are on antibiotics, have had a recent vaccine, have surgery or dental work and your doctor wants your visit rescheduled.  - You need to cancel and reschedule your visit for any reason. Call at least 2 days before your visit if you need to cancel.   - Your insurance changes before your next visit.    (We will need to get approval from your new insurance. This can take up to two weeks.)     [facility] is opened Monday thru Friday. We are closed on weekends  and holidays.   Voice mail will take your call if the center is closed. If you leave a message please allow 24 hours for a call back during weekdays. If you leave a message on a weekend/holiday, we will call you back the next business day.    A pharmacist is available Monday - Friday from 8:30AM to 3:30PM to help answer any questions you may have about your prescriptions(s). Please call pharmacy at:    [facility]: [phone]  [facility]: [phone]  [facility]: [phone]

## 2024-12-19 NOTE — PROGRESS NOTES
[facility]   Infusion Clinic Note   Date: 2024   Name: Heather Thurston  : 1950   MRN: [id number]          Reason for Visit: Follow-up and Injections (Monthly Cimzia 400mg subcutaneous/injection)         Today: We administered certolizumab pegol.       Visit Type: INJECTION       Ordered By:  Cali Jameson MD       Accompanied by:Self       Diagnosis: Rheumatoid arthritis of multiple sites without rheumatoid factor (Multi)        Allergies:   Allergies as of 2024 - Reviewed 2024   Allergen Reaction Noted    Atropine Anaphylaxis, Nausea And Vomiting, Palpitations, and Rash 2023    Codeine Itching and Unknown 2023    Ezetimibe Unknown 2023    Iodinated contrast media Other and Syncope 2023    Iodine Nausea/vomiting 10/16/2019    Lisinopril Cough 2023    Metformin hcl Diarrhea 2023    Simvastatin Unknown 2023    Adhesive tape-silicones Rash 2023          Current Medications has a current medication list which includes the following prescription(s): acetaminophen, amitriptyline, ascorbic acid, calcium carbonate, calcium citrate, cimzia, cholecalciferol, cyanocobalamin, prolia, dicyclomine, trulicity, famotidine, gabapentin, leflunomide, multivit-minerals/folic acid, omeprazole, oxybutynin, sertraline, and sulfasalazine.       Vitals:   Vitals:    24 1110   BP: 133/77   Pulse: 79   Resp: 16   Temp: 36.4 °C (97.6 °F)   TempSrc: Temporal   SpO2: 92%   Weight: 69.3 kg (152 lb 11.2 oz)             Infusion Pre-procedure Checklist:   - Allergies reviewed: yes   - Medications reviewed: yes       - Previous reaction to current treatment: no      Assess patient for the concerns below. Document provider notification as appropriate.  - Active or recent infection with/without current antibiotic use: no  - Recent or planned invasive dental work: no  - Recent or planned surgeries:  10/2024 Had eye surgery on the left eye  -  "Recently received or plans to receive vaccinations: no  - Has treatment related toxicities: no  - Is pregnant:  no      Pain: 7/10 Generalized due to arthritis   - Is the pain different from normal: no   - Is your Doctor aware:  n/a                Fall Risk Screening: More Fall Risk  History of Falling, Immediate or Within 3 Months: No  Ambulatory Aid: Walks without aid/bedrest/nurse assist  Intravenous Therapy/Heparin Lock: No  Gait/Transferring: Normal/bedrest/immobile  Mental Status: Oriented to own ability       Review Of Systems:  Review of Systems   Respiratory:  Negative for cough, shortness of breath and wheezing.    Cardiovascular:  Negative for chest pain, leg swelling and palpitations.   Skin:  Negative for itching, rash and wound.        Patient has minimal itching   Neurological:  Negative for dizziness, extremity weakness, light-headedness and numbness.         ROS completed? Yes      Infusion Readiness:  - Assessment Concerns Related to Infusion: No  - Provider notified: no      Document Below Only If Indicated:   New Patient Education:    N/A (returning patient for continuation of therapy. Ongoing education provided as needed.)        Treatment Conditions & Drug Specific Questions:    Certolizumab Pegol  (CIMZIA)    (Unless otherwise specified on patient specific therapy plan):     TREATMENT CONDITIONS:  Unless otherwise specified on patient specific therapy plan HOLD treatment and notify provider prior to proceeding with today's infusion if patient with a:  o Positive Hepatitis B Surface Ag  o Positive T-Spot  o NO baseline CBC-D obtained  o NO baseline CMP obtained    HEPBSAG - NONREACTIVE on 08/29/2023    No results found for: \"HAGCN\", \"HEPBSURABI\", \"HBSAG\", \"XHAGF\", \"HEPBSAG\", \"EXTHEPBSAG\", \"NONUHSWGH\"   No results found for: \"NONUHFIRE\", \"NONUHSWGH\", \"NONUHFISH\", \"EXTHEPBSAG\"  Lab Results   Component Value Date    TBSIN Negative 07/30/2024      Lab Results   Component Value Date    WBC 5.1 " 11/07/2024    HGB 13.0 11/07/2024    HCT 41.1 11/07/2024    MCV 90 11/07/2024     11/07/2024        Chemistry    Lab Results   Component Value Date/Time     11/07/2024 1518    K 4.2 11/07/2024 1518     11/07/2024 1518    CO2 32 11/07/2024 1518    BUN 10 11/07/2024 1518    CREATININE 0.66 11/07/2024 1518    Lab Results   Component Value Date/Time    CALCIUM 8.9 11/07/2024 1518    ALKPHOS 32 (L) 11/07/2024 1518    AST 18 11/07/2024 1518    ALT 10 11/07/2024 1518    BILITOT 0.4 11/07/2024 1518           Labs reviewed and patient meets treatment conditions? Yes    DRUG SPECIFIC QUESTIONS:  - Latex Allergy? No     (Caution: Prefilled syringe may include Latex)    - Any new or worsening signs / symptoms of heart failure which may include things like worsening shortness of breath, swelling, fatigue? No   (If yes notify provider before proceeding with today's infusion. New onset or worsening HF have been reported)      REMINDERS:  Recommended Vitals/Observation:  Vitals: Obtain vitals prior to injection.  Observation: Patient may leave immediately following injection.        Weight Based Drug Calculations:    WEIGHT BASED DRUGS: NOT APPLICABLE / FLAT DOSE          Initiated By: Elle Cortez LPN   _________________________________________________________________________    Note authored and patient cared for by: Elle Cortez LPN  Note/Encounter reviewed by: Winifred GUILLEN NP. This provider on site at time of patient injection. Staff to notify this provider of any questions, concerns, abnormals or issues during encounter. No issues reported. Pt. tolerated without difficulty. Pt. not independently evaluated by this provider during today's encounter

## 2025-01-08 PROCEDURE — RXMED WILLOW AMBULATORY MEDICATION CHARGE

## 2025-01-10 ENCOUNTER — PHARMACY VISIT (OUTPATIENT)
Dept: PHARMACY | Facility: CLINIC | Age: 75
End: 2025-01-10
Payer: COMMERCIAL

## 2025-01-14 ENCOUNTER — HOSPITAL ENCOUNTER (OUTPATIENT)
Dept: RADIOLOGY | Facility: CLINIC | Age: 75
Discharge: HOME | End: 2025-01-14
Payer: MEDICARE

## 2025-01-14 VITALS — BODY MASS INDEX: 26.68 KG/M2 | WEIGHT: 145 LBS | HEIGHT: 62 IN

## 2025-01-14 DIAGNOSIS — Z12.31 ENCOUNTER FOR SCREENING MAMMOGRAM FOR BREAST CANCER: ICD-10-CM

## 2025-01-14 PROCEDURE — 77063 BREAST TOMOSYNTHESIS BI: CPT | Performed by: STUDENT IN AN ORGANIZED HEALTH CARE EDUCATION/TRAINING PROGRAM

## 2025-01-14 PROCEDURE — 77067 SCR MAMMO BI INCL CAD: CPT | Performed by: STUDENT IN AN ORGANIZED HEALTH CARE EDUCATION/TRAINING PROGRAM

## 2025-01-14 PROCEDURE — 77067 SCR MAMMO BI INCL CAD: CPT

## 2025-01-16 ENCOUNTER — TELEPHONE (OUTPATIENT)
Dept: SURGERY | Facility: CLINIC | Age: 75
End: 2025-01-16

## 2025-01-16 ENCOUNTER — APPOINTMENT (OUTPATIENT)
Dept: INFUSION THERAPY | Facility: CLINIC | Age: 75
End: 2025-01-16
Payer: MEDICARE

## 2025-01-16 ENCOUNTER — OFFICE VISIT (OUTPATIENT)
Dept: SURGERY | Facility: CLINIC | Age: 75
End: 2025-01-16
Payer: MEDICARE

## 2025-01-16 VITALS
OXYGEN SATURATION: 98 % | BODY MASS INDEX: 27.12 KG/M2 | HEART RATE: 90 BPM | WEIGHT: 148.3 LBS | SYSTOLIC BLOOD PRESSURE: 118 MMHG | DIASTOLIC BLOOD PRESSURE: 72 MMHG | TEMPERATURE: 98 F | RESPIRATION RATE: 16 BRPM

## 2025-01-16 DIAGNOSIS — M06.09 RHEUMATOID ARTHRITIS OF MULTIPLE SITES WITHOUT RHEUMATOID FACTOR (MULTI): ICD-10-CM

## 2025-01-16 DIAGNOSIS — K90.9 INTESTINAL MALABSORPTION, UNSPECIFIED TYPE (HHS-HCC): ICD-10-CM

## 2025-01-16 DIAGNOSIS — E53.8 VITAMIN B 12 DEFICIENCY: Primary | ICD-10-CM

## 2025-01-16 PROCEDURE — 1126F AMNT PAIN NOTED NONE PRSNT: CPT

## 2025-01-16 PROCEDURE — 96372 THER/PROPH/DIAG INJ SC/IM: CPT | Performed by: NURSE PRACTITIONER

## 2025-01-16 PROCEDURE — 1036F TOBACCO NON-USER: CPT

## 2025-01-16 PROCEDURE — 96372 THER/PROPH/DIAG INJ SC/IM: CPT

## 2025-01-16 PROCEDURE — 1159F MED LIST DOCD IN RCRD: CPT

## 2025-01-16 RX ORDER — FAMOTIDINE 10 MG/ML
20 INJECTION INTRAVENOUS ONCE AS NEEDED
OUTPATIENT
Start: 2025-02-04

## 2025-01-16 RX ORDER — ALBUTEROL SULFATE 0.83 MG/ML
3 SOLUTION RESPIRATORY (INHALATION) AS NEEDED
OUTPATIENT
Start: 2025-02-04

## 2025-01-16 RX ORDER — DIPHENHYDRAMINE HYDROCHLORIDE 50 MG/ML
50 INJECTION INTRAMUSCULAR; INTRAVENOUS AS NEEDED
OUTPATIENT
Start: 2025-02-04

## 2025-01-16 RX ORDER — EPINEPHRINE 0.3 MG/.3ML
0.3 INJECTION SUBCUTANEOUS EVERY 5 MIN PRN
OUTPATIENT
Start: 2025-02-04

## 2025-01-16 RX ORDER — CYANOCOBALAMIN 1000 UG/ML
1000 INJECTION, SOLUTION INTRAMUSCULAR; SUBCUTANEOUS ONCE
Status: COMPLETED | OUTPATIENT
Start: 2025-01-16 | End: 2025-01-16

## 2025-01-16 RX ADMIN — CYANOCOBALAMIN 1000 MCG: 1000 INJECTION, SOLUTION INTRAMUSCULAR; SUBCUTANEOUS at 15:32

## 2025-01-16 ASSESSMENT — ENCOUNTER SYMPTOMS
SHORTNESS OF BREATH: 0
EXTREMITY WEAKNESS: 0
NUMBNESS: 0
PALPITATIONS: 0
COUGH: 0
WHEEZING: 0
WOUND: 0
DIZZINESS: 0
LIGHT-HEADEDNESS: 0
LEG SWELLING: 0

## 2025-01-16 ASSESSMENT — PATIENT HEALTH QUESTIONNAIRE - PHQ9
1. LITTLE INTEREST OR PLEASURE IN DOING THINGS: NOT AT ALL
SUM OF ALL RESPONSES TO PHQ9 QUESTIONS 1 AND 2: 0
2. FEELING DOWN, DEPRESSED OR HOPELESS: NOT AT ALL

## 2025-01-16 ASSESSMENT — PAIN SCALES - GENERAL: PAINLEVEL_OUTOF10: 0-NO PAIN

## 2025-01-16 NOTE — PATIENT INSTRUCTIONS
Today :We administered certolizumab pegol. Monthly Cimzia 400mg (total) subcutaneous injection  200mg in the right upper abdomen  200mg in the left upper abdomen    For:   1. Rheumatoid arthritis of multiple sites without rheumatoid factor (Multi)       Your next appointment is due in:  1 month        Please read the  Medication Guide that was given to you and reviewed during todays visit.     (Tell all doctors including dentists that you are taking this medication)     Go to the emergency room or call 911 if:  -You have signs of allergic reaction:   -Rash, hives, itching.   -Swollen, blistered, peeling skin.   -Swelling of face, lips, mouth, tongue or throat.   -Tightness of chest, trouble breathing, swallowing or talking     Call your doctor:  - If injection site gets red, warm, swollen, itchy or leaks fluid or pus.     (Leave band aid on your injection site for at least 2 hours and keep area clean and dry  - If you get sick or have symptoms of infection or are not feeling well for any reason.    (Wash your hands often, stay away from people who are sick)  - If you have side effects from your medication that do not go away or are bothersome.     (Refer to the teaching your nurse gave you for side effects to call your doctor about)    - Common side effects may include:  stuffy nose, headache, feeling tired, muscle aches, upset stomach  - Before receiving any vaccines     - Call [facility] at   If:  - You get sick, are on antibiotics, have had a recent vaccine, have surgery or dental work and your doctor wants your visit rescheduled.  - You need to cancel and reschedule your visit for any reason. Call at least 2 days before your visit if you need to cancel.   - Your insurance changes before your next visit.    (We will need to get approval from your new insurance. This can take up to two weeks.)     [facility] is opened Monday thru Friday. We are closed on weekends and  holidays.   Voice mail will take your call if the center is closed. If you leave a message please allow 24 hours for a call back during weekdays. If you leave a message on a weekend/holiday, we will call you back the next business day.    A pharmacist is available Monday - Friday from 8:30AM to 3:30PM to help answer any questions you may have about your prescriptions(s). Please call pharmacy at:    [facility]: [phone]  [facility]: [phone]  [facility]: [phone]

## 2025-01-16 NOTE — PROGRESS NOTES
[facility]   Infusion Clinic Note   Date: 2025   Name: Heather Thurston  : 1950   MRN: [id number]          Reason for Visit: Follow-up and Injections (Monthly Cimzia 400mg (total) subcutaneous injection/)         Today: We administered certolizumab pegol.       Visit Type: INJECTION       Ordered By:  Cali Jameson MD       Accompanied by:Self       Diagnosis: Rheumatoid arthritis of multiple sites without rheumatoid factor (Multi)        Allergies:   Allergies as of 2025 - Reviewed 2025   Allergen Reaction Noted    Atropine Anaphylaxis, Nausea And Vomiting, Palpitations, and Rash 2023    Codeine Itching and Unknown 2023    Ezetimibe Unknown 2023    Iodinated contrast media Other and Syncope 2023    Iodine Nausea/vomiting 10/16/2019    Lisinopril Cough 2023    Metformin hcl Diarrhea 2023    Simvastatin Unknown 2023    Adhesive tape-silicones Rash 2023          Current Medications has a current medication list which includes the following prescription(s): acetaminophen, amitriptyline, ascorbic acid, calcium carbonate, calcium citrate, cimzia, cholecalciferol, cyanocobalamin, prolia, dicyclomine, trulicity, famotidine, gabapentin, leflunomide, multivit-minerals/folic acid, omeprazole, oxybutynin, sertraline, and sulfasalazine.       Vitals:   Vitals:    25 1102   BP: 118/72   Pulse: 90   Resp: 16   Temp: 36.7 °C (98 °F)   TempSrc: Temporal   SpO2: 98%   Weight: 67.3 kg (148 lb 4.8 oz)             Infusion Pre-procedure Checklist:   - Allergies reviewed: yes   - Medications reviewed: yes       - Previous reaction to current treatment: no      Assess patient for the concerns below. Document provider notification as appropriate.  - Active or recent infection with/without current antibiotic use: no  - Recent or planned invasive dental work: no  - Recent or planned surgeries:  no  - Recently received or plans to  receive vaccinations: Yes B-12 today.  CNP aware. OK to give.    - Has treatment related toxicities: no  - Is pregnant:  no      Pain: 5/10 Generalized due to arthritis   - Is the pain different from normal: no   - Is your Doctor aware:  n/a                Fall Risk Screening: More Fall Risk  History of Falling, Immediate or Within 3 Months: Yes (Fell the week after [place].  Had flu and was weak.   Fell on the floor and bruised the left knee and stomach.)  Secondary Diagnosis: No  Ambulatory Aid: Walks without aid/bedrest/nurse assist  Intravenous Therapy/Heparin Lock: No  Gait/Transferring: Normal/bedrest/immobile  Mental Status: Oriented to own ability  More Fall Risk Score: 25       Review Of Systems:  Review of Systems   Respiratory:  Negative for cough, shortness of breath and wheezing.    Cardiovascular:  Negative for chest pain, leg swelling and palpitations.   Skin:  Negative for itching, rash and wound.        Patient has minimal itching   Neurological:  Negative for dizziness, extremity weakness, light-headedness and numbness.         ROS completed? Yes      Infusion Readiness:  - Assessment Concerns Related to Infusion: No  - Provider notified: no      Document Below Only If Indicated:   New Patient Education:    N/A (returning patient for continuation of therapy. Ongoing education provided as needed.)        Treatment Conditions & Drug Specific Questions:    Certolizumab Pegol  (CIMZIA)    (Unless otherwise specified on patient specific therapy plan):     TREATMENT CONDITIONS:  Unless otherwise specified on patient specific therapy plan HOLD treatment and notify provider prior to proceeding with today's infusion if patient with a:  o Positive Hepatitis B Surface Ag  o Positive T-Spot  o NO baseline CBC-D obtained  o NO baseline CMP obtained    Lab Results   Component Value Date    TBSIN Negative 07/30/2024      Lab Results   Component Value Date    WBC 5.1 11/07/2024    HGB 13.0 11/07/2024    HCT  41.1 11/07/2024    MCV 90 11/07/2024     11/07/2024        Chemistry    Lab Results   Component Value Date/Time     11/07/2024 1518    K 4.2 11/07/2024 1518     11/07/2024 1518    CO2 32 11/07/2024 1518    BUN 10 11/07/2024 1518    CREATININE 0.66 11/07/2024 1518    Lab Results   Component Value Date/Time    CALCIUM 8.9 11/07/2024 1518    ALKPHOS 32 (L) 11/07/2024 1518    AST 18 11/07/2024 1518    ALT 10 11/07/2024 1518    BILITOT 0.4 11/07/2024 1518           Labs reviewed and patient meets treatment conditions? Yes    DRUG SPECIFIC QUESTIONS:  - Latex Allergy? No     (Caution: Prefilled syringe may include Latex)    - Any new or worsening signs / symptoms of heart failure which may include things like worsening shortness of breath, swelling, fatigue? No   (If yes notify provider before proceeding with today's infusion. New onset or worsening HF have been reported)      REMINDERS:  Recommended Vitals/Observation:  Vitals: Obtain vitals prior to injection.  Observation: Patient may leave immediately following injection.        Weight Based Drug Calculations:    WEIGHT BASED DRUGS: NOT APPLICABLE / FLAT DOSE          Initiated By: Elle Cortez LPN   _________________________________________________________________________    Note authored and patient cared for by: Elle Cortez LPN  Note/Encounter reviewed by: Winifred GUILLEN NP. This provider on site at time of patient injection. Staff to notify this provider of any questions, concerns, abnormals or issues during encounter. No issues reported. Pt. tolerated without difficulty. Pt. not independently evaluated by this provider during today's encounter

## 2025-01-27 ENCOUNTER — TELEPHONE (OUTPATIENT)
Dept: RHEUMATOLOGY | Facility: CLINIC | Age: 75
End: 2025-01-27
Payer: MEDICARE

## 2025-02-03 DIAGNOSIS — M19.90 CHRONIC ARTHRITIS: ICD-10-CM

## 2025-02-03 RX ORDER — TRAMADOL HYDROCHLORIDE 50 MG/1
50 TABLET ORAL DAILY PRN
Qty: 30 TABLET | Refills: 0 | Status: SHIPPED | OUTPATIENT
Start: 2025-02-03

## 2025-02-04 ENCOUNTER — TELEPHONE (OUTPATIENT)
Dept: PRIMARY CARE | Facility: CLINIC | Age: 75
End: 2025-02-04

## 2025-02-04 ENCOUNTER — APPOINTMENT (OUTPATIENT)
Dept: RHEUMATOLOGY | Facility: CLINIC | Age: 75
End: 2025-02-04
Payer: MEDICARE

## 2025-02-04 ENCOUNTER — TELEPHONE (OUTPATIENT)
Dept: INFUSION THERAPY | Facility: CLINIC | Age: 75
End: 2025-02-04

## 2025-02-04 ENCOUNTER — TELEPHONE (OUTPATIENT)
Dept: RHEUMATOLOGY | Facility: CLINIC | Age: 75
End: 2025-02-04

## 2025-02-04 VITALS
BODY MASS INDEX: 27.73 KG/M2 | DIASTOLIC BLOOD PRESSURE: 82 MMHG | SYSTOLIC BLOOD PRESSURE: 122 MMHG | HEART RATE: 85 BPM | WEIGHT: 151.6 LBS | OXYGEN SATURATION: 100 %

## 2025-02-04 DIAGNOSIS — R06.2 WHEEZING: ICD-10-CM

## 2025-02-04 DIAGNOSIS — M06.9 RHEUMATOID ARTHRITIS, INVOLVING UNSPECIFIED SITE, UNSPECIFIED WHETHER RHEUMATOID FACTOR PRESENT (MULTI): ICD-10-CM

## 2025-02-04 DIAGNOSIS — J20.9 ACUTE BRONCHITIS, UNSPECIFIED ORGANISM: Primary | ICD-10-CM

## 2025-02-04 DIAGNOSIS — M19.90 CHRONIC ARTHRITIS: ICD-10-CM

## 2025-02-04 DIAGNOSIS — R06.02 SHORTNESS OF BREATH: Primary | ICD-10-CM

## 2025-02-04 DIAGNOSIS — Z79.899 ENCOUNTER FOR LONG-TERM (CURRENT) USE OF MEDICATIONS: ICD-10-CM

## 2025-02-04 DIAGNOSIS — R53.1 WEAK: ICD-10-CM

## 2025-02-04 DIAGNOSIS — R05.8 COUGH PRESENT FOR GREATER THAN 3 WEEKS: ICD-10-CM

## 2025-02-04 PROCEDURE — 1159F MED LIST DOCD IN RCRD: CPT | Performed by: INTERNAL MEDICINE

## 2025-02-04 PROCEDURE — 3074F SYST BP LT 130 MM HG: CPT | Performed by: INTERNAL MEDICINE

## 2025-02-04 PROCEDURE — 1036F TOBACCO NON-USER: CPT | Performed by: INTERNAL MEDICINE

## 2025-02-04 PROCEDURE — 3079F DIAST BP 80-89 MM HG: CPT | Performed by: INTERNAL MEDICINE

## 2025-02-04 PROCEDURE — 99214 OFFICE O/P EST MOD 30 MIN: CPT | Performed by: INTERNAL MEDICINE

## 2025-02-04 RX ORDER — TRAMADOL HYDROCHLORIDE 50 MG/1
50 TABLET ORAL DAILY PRN
Qty: 30 TABLET | Refills: 0 | Status: CANCELLED | OUTPATIENT
Start: 2025-02-04

## 2025-02-04 RX ORDER — LEFLUNOMIDE 20 MG/1
20 TABLET ORAL DAILY
Qty: 90 TABLET | Refills: 1 | Status: SHIPPED | OUTPATIENT
Start: 2025-02-04

## 2025-02-04 RX ORDER — AMOXICILLIN AND CLAVULANATE POTASSIUM 875; 125 MG/1; MG/1
875 TABLET, FILM COATED ORAL 2 TIMES DAILY
Qty: 20 TABLET | Refills: 0 | Status: SHIPPED | OUTPATIENT
Start: 2025-02-04 | End: 2025-02-14

## 2025-02-04 RX ORDER — METHYLPREDNISOLONE 4 MG/1
TABLET ORAL
Qty: 21 TABLET | Refills: 0 | Status: SHIPPED | OUTPATIENT
Start: 2025-02-04 | End: 2025-02-10

## 2025-02-04 ASSESSMENT — ENCOUNTER SYMPTOMS
LOSS OF SENSATION IN FEET: 0
OCCASIONAL FEELINGS OF UNSTEADINESS: 0
WEAKNESS: 1
SHORTNESS OF BREATH: 1
FATIGUE: 1
ROS SKIN COMMENTS: HAIR THINNING
COUGH: 1
NAUSEA: 1
DEPRESSION: 0
ROS GI COMMENTS: HEARTBURN

## 2025-02-04 NOTE — TELEPHONE ENCOUNTER
Pt c/o cough with thick green mucus x3 weeks, weakness, SOB with walking, no energy to do anything x3 weeks.  No vomiting, diarrhea, CP.  Has only taken elderberry.    Pt advised to test for COVID and call office back with results.

## 2025-02-04 NOTE — TELEPHONE ENCOUNTER
Pt states COVID test is negative.  Symptoms noted below.  Asking if there is something you could Rx.  Please advise.  Ph: [phone]    [address]

## 2025-02-04 NOTE — TELEPHONE ENCOUNTER
Pt advised Rx sent for Augmentin / MedrolPak and Mucinex DM 12 hrs release.  Advised to take the breakfast and lunch pills of MedrolPak today after lunch and then follow instructions on the back of the jonathan - always take with food.  If still having wheezing and SOB with walking after 24 hours, please go to the ED.

## 2025-02-04 NOTE — PROGRESS NOTES
Subjective   Patient ID: Heather Thurston is a 75 y.o. female who presents for Follow-up.  HPI  Patient with history of rheumatoid arthritis positive CCP, osteoarthritis, chronic back pain, with neuropathy.  Also has a history of gastric bypass, carpal tunnel release, hysterectomy, cholecystectomy and bilateral knee replacement previous patient of Dr. Cali Jameson    DEXA 7/25/2024 lumbar spine -1.6, left total hip -1.5, left femoral neck -2.1  DEXA 3/23/2021 lumbar spine -2.4 left total hip -1.4, left femoral neck -2.0.    Patient was last seen in October 2024 and at that time we had her continue with leflunomide and Cimzia.  She had been doing okay but has been sick.  She had been sick in December and was getting better but then got another infection.  She has been coughing yellow-green and feels weak and just wants to stay in bed.  This been ongoing for the last 3 weeks.  She was just called in an antibiotic and a Medrol Dosepak which she has yet to take.    She feels that for her musculoskeletal symptoms, she has been stable and does still have some issues but feels that her current regimen has been working for her.    She has been couging yellow green and weakn all over and stay in pbed has vbeen 3 weeks.      Review of Systems   Constitutional:  Positive for fatigue.   HENT:          Dry mouth   Eyes:         Dry eyes   Respiratory:  Positive for cough and shortness of breath.    Gastrointestinal:  Positive for nausea.        Heartburn   Musculoskeletal:         Morning stiffness, joint pain, muscle weakness and tenderness, joint swelling   Skin:         hAir thinning   Neurological:  Positive for weakness.       Objective   Physical Exam  GEN: NAD A&O x3 appropriate affect  HENT: no enlarged glands or thyroid  EYES:  no conjunctival redness, eyelids normal  LYMPH: no cervica lymphadenopathy  Lungs some wheezing coarse sounds  NEURO: 5/5 strength upper and lower extremities, DTR +2 bicep and patellar  tendons  SKIN: no rashes, ulcers, or subcutaneous nodules  PULSES: +radials  TENDER POINTS: 0/18   MSK:  Bilateral knee replacements  Fullness in the MCP 2 3  Assessment/Plan     Seronegative rheumatoid arthritis positive CCP.  Previously had been on sulfasalazine   -Told her to hold her medicines if she gets sick.  If she is due for her Cimzia I would have her hold it if she still not feeling well.   -She will be getting blood work soon and I will review this to evaluate for any medication toxicity    Told her if she does not improve after antibiotic and her Medrol Dosepak that you can get an chest x-ray.    Postmenopausal osteoporosis with history of gastric bypass.  Has had stress fractures in her feet previously.   DEXA 7/25/2024 lumbar spine -1.6, left total hip -1.5, left femoral neck -2.1  DEXA 3/23/2021 lumbar spine -2.4 left total hip -1.4, left femoral neck -2.0   -Will have her continue with Prolia    Osteoarthritis and chronic pain   -Patient says she was using tramadol once a day.  She is up-to-date with her urine drug screen and controlled substance contract.  Checked [place].      Ludivina Chowdhury MD 02/04/25 1:44 PM

## 2025-02-05 PROCEDURE — RXMED WILLOW AMBULATORY MEDICATION CHARGE

## 2025-02-06 ENCOUNTER — PHARMACY VISIT (OUTPATIENT)
Dept: PHARMACY | Facility: CLINIC | Age: 75
End: 2025-02-06
Payer: COMMERCIAL

## 2025-02-11 ENCOUNTER — TELEPHONE (OUTPATIENT)
Dept: INFUSION THERAPY | Facility: CLINIC | Age: 75
End: 2025-02-11

## 2025-02-11 ENCOUNTER — APPOINTMENT (OUTPATIENT)
Dept: INFUSION THERAPY | Facility: CLINIC | Age: 75
End: 2025-02-11
Payer: MEDICARE

## 2025-02-12 ENCOUNTER — TELEPHONE (OUTPATIENT)
Dept: SURGERY | Facility: CLINIC | Age: 75
End: 2025-02-12
Payer: MEDICARE

## 2025-02-12 NOTE — TELEPHONE ENCOUNTER
patient called to confirm appt is changed/ appt date and time provided to the patient March 6, 2025 at 200 pm. Patient is aware to get labs done for the appt.

## 2025-02-13 ENCOUNTER — APPOINTMENT (OUTPATIENT)
Dept: INFUSION THERAPY | Facility: CLINIC | Age: 75
End: 2025-02-13
Payer: MEDICARE

## 2025-02-17 ENCOUNTER — APPOINTMENT (OUTPATIENT)
Dept: SURGERY | Facility: CLINIC | Age: 75
End: 2025-02-17
Payer: MEDICARE

## 2025-02-25 NOTE — PROGRESS NOTES
Subjective   Patient ID: Heather Thurston is a 75 y.o. female who presents for No chief complaint on file..  HPI  FUV LABS  START WT: 204   IDEAL WT:  135   START EXCESS:   69   HT:62.25 IN.   Review of Systems  CONSTITUTIONAL:          Chills No.  Fatigue yes. Admits to weakness  Fever No.         CARDIOLOGY:          Negative for dizziness, chest pain, palpitations, shortness of breath.         RESPIRATORY:          Sleep Apnea No.  Negative for chest congestion, cough, wheezing.         GASTROENTEROLOGY:          Food Intolerance No.  Abdominal pain DESCRIBED as stomach burning  Acid reflux yes.  Black stools No.  Constipation No.  Diarrhea yes Loss of appetite no.  Nausea YES.  Vomiting No.         UROLOGY:          Negative for dysuria, urinary urgency, kidney stones.         PSYCHOLOGY:          ADMITS TO  depression, anxiety, high stress level.    Objective   Physical Exam    Assessment/Plan            Blanche Ware LPN 02/25/25 4:56 PM

## 2025-02-28 PROCEDURE — RXMED WILLOW AMBULATORY MEDICATION CHARGE

## 2025-03-03 ENCOUNTER — PHARMACY VISIT (OUTPATIENT)
Dept: PHARMACY | Facility: CLINIC | Age: 75
End: 2025-03-03
Payer: COMMERCIAL

## 2025-03-04 ENCOUNTER — TELEPHONE (OUTPATIENT)
Dept: SURGERY | Facility: CLINIC | Age: 75
End: 2025-03-04
Payer: MEDICARE

## 2025-03-04 NOTE — TELEPHONE ENCOUNTER
LVM LAB/ APPT REMINDER/ IF PATIENT CAN NOT GET LABS DONE PRIOR TO THE APPT WE WILL NEED TO R/S THE APPT. REQUEST A RETURN CALL. AWAITING CALL BACK.

## 2025-03-06 ENCOUNTER — APPOINTMENT (OUTPATIENT)
Dept: SURGERY | Facility: CLINIC | Age: 75
End: 2025-03-06
Payer: MEDICARE

## 2025-03-06 VITALS
SYSTOLIC BLOOD PRESSURE: 148 MMHG | BODY MASS INDEX: 26.5 KG/M2 | HEIGHT: 62 IN | DIASTOLIC BLOOD PRESSURE: 82 MMHG | WEIGHT: 144 LBS | HEART RATE: 88 BPM

## 2025-03-06 DIAGNOSIS — Z98.84 HISTORY OF GASTRIC BYPASS: ICD-10-CM

## 2025-03-06 DIAGNOSIS — E21.3 HYPERPARATHYROIDISM (MULTI): ICD-10-CM

## 2025-03-06 DIAGNOSIS — K90.9 INTESTINAL MALABSORPTION, UNSPECIFIED TYPE (HHS-HCC): Primary | ICD-10-CM

## 2025-03-06 DIAGNOSIS — K21.9 GASTROESOPHAGEAL REFLUX DISEASE, UNSPECIFIED WHETHER ESOPHAGITIS PRESENT: ICD-10-CM

## 2025-03-06 DIAGNOSIS — E53.8 VITAMIN B 12 DEFICIENCY: ICD-10-CM

## 2025-03-06 DIAGNOSIS — E61.0 COPPER DEFICIENCY: ICD-10-CM

## 2025-03-06 DIAGNOSIS — E55.9 VITAMIN D DEFICIENCY: ICD-10-CM

## 2025-03-06 LAB
25(OH)D3+25(OH)D2 SERPL-MCNC: 32 NG/ML (ref 30–100)
ALBUMIN SERPL-MCNC: 4.4 G/DL (ref 3.6–5.1)
ALP SERPL-CCNC: 37 U/L (ref 37–153)
ALT SERPL-CCNC: 10 U/L (ref 6–29)
ANION GAP SERPL CALCULATED.4IONS-SCNC: 8 MMOL/L (CALC) (ref 7–17)
AST SERPL-CCNC: 17 U/L (ref 10–35)
BASOPHILS # BLD AUTO: 72 CELLS/UL (ref 0–200)
BASOPHILS NFR BLD AUTO: 1.3 %
BILIRUB SERPL-MCNC: 0.4 MG/DL (ref 0.2–1.2)
BUN SERPL-MCNC: 10 MG/DL (ref 7–25)
CALCIUM SERPL-MCNC: 10.2 MG/DL (ref 8.6–10.4)
CHLORIDE SERPL-SCNC: 104 MMOL/L (ref 98–110)
CO2 SERPL-SCNC: 27 MMOL/L (ref 20–32)
COPPER BLD-MCNC: NORMAL UG/DL
CREAT SERPL-MCNC: 0.76 MG/DL (ref 0.6–1)
EGFRCR SERPLBLD CKD-EPI 2021: 82 ML/MIN/1.73M2
EOSINOPHIL # BLD AUTO: 358 CELLS/UL (ref 15–500)
EOSINOPHIL NFR BLD AUTO: 6.5 %
ERYTHROCYTE [DISTWIDTH] IN BLOOD BY AUTOMATED COUNT: 12.7 % (ref 11–15)
FERRITIN SERPL-MCNC: 106 NG/ML (ref 16–288)
FOLATE SERPL-MCNC: 15.3 NG/ML
GLUCOSE SERPL-MCNC: 199 MG/DL (ref 65–99)
HCT VFR BLD AUTO: 44.3 % (ref 35–45)
HGB BLD-MCNC: 13.7 G/DL (ref 11.7–15.5)
IRON SATN MFR SERPL: 35 % (CALC) (ref 16–45)
IRON SERPL-MCNC: 138 MCG/DL (ref 45–160)
LYMPHOCYTES # BLD AUTO: 1672 CELLS/UL (ref 850–3900)
LYMPHOCYTES NFR BLD AUTO: 30.4 %
MCH RBC QN AUTO: 29.4 PG (ref 27–33)
MCHC RBC AUTO-ENTMCNC: 30.9 G/DL (ref 32–36)
MCV RBC AUTO: 95.1 FL (ref 80–100)
MONOCYTES # BLD AUTO: 479 CELLS/UL (ref 200–950)
MONOCYTES NFR BLD AUTO: 8.7 %
NEUTROPHILS # BLD AUTO: 2921 CELLS/UL (ref 1500–7800)
NEUTROPHILS NFR BLD AUTO: 53.1 %
PLATELET # BLD AUTO: 220 THOUSAND/UL (ref 140–400)
PMV BLD REES-ECKER: 11.2 FL (ref 7.5–12.5)
POTASSIUM SERPL-SCNC: 4.3 MMOL/L (ref 3.5–5.3)
PROT SERPL-MCNC: 6.4 G/DL (ref 6.1–8.1)
PTH-INTACT SERPL-MCNC: 152 PG/ML (ref 16–77)
RBC # BLD AUTO: 4.66 MILLION/UL (ref 3.8–5.1)
SODIUM SERPL-SCNC: 139 MMOL/L (ref 135–146)
TIBC SERPL-MCNC: 394 MCG/DL (CALC) (ref 250–450)
VIT B1 BLD-SCNC: NORMAL NMOL/L
VIT B12 SERPL-MCNC: 866 PG/ML (ref 200–1100)
WBC # BLD AUTO: 5.5 THOUSAND/UL (ref 3.8–10.8)
ZINC SERPL-MCNC: 70 MCG/DL (ref 60–130)

## 2025-03-06 PROCEDURE — 3077F SYST BP >= 140 MM HG: CPT

## 2025-03-06 PROCEDURE — 3079F DIAST BP 80-89 MM HG: CPT

## 2025-03-06 PROCEDURE — 1036F TOBACCO NON-USER: CPT

## 2025-03-06 PROCEDURE — 1126F AMNT PAIN NOTED NONE PRSNT: CPT

## 2025-03-06 PROCEDURE — 99214 OFFICE O/P EST MOD 30 MIN: CPT

## 2025-03-06 RX ORDER — OMEPRAZOLE 40 MG/1
40 CAPSULE, DELAYED RELEASE ORAL
Qty: 180 CAPSULE | Refills: 3 | Status: SHIPPED | OUTPATIENT
Start: 2025-03-06 | End: 2026-03-06

## 2025-03-06 ASSESSMENT — PAIN SCALES - GENERAL: PAINLEVEL_OUTOF10: 0-NO PAIN

## 2025-03-06 NOTE — PROGRESS NOTES
Subjective   Patient ID: Heather Thurston is a 75 y.o. female who presents for Follow-up ([facility] labs).  DAGMAR Romero is here for follow up after having abnormal labwork. She has lost 8 pounds since her last appointment. She takes an adult mvi, daily B12, biotin, vitamin C, vitamin D and calcium citrate BID. She did not add a copper after her last appointment.       Objective   Physical Exam  Constitutional:       Appearance: Normal appearance.   Eyes:      Pupils: Pupils are equal, round, and reactive to light.   Cardiovascular:      Rate and Rhythm: Normal rate.   Pulmonary:      Effort: Pulmonary effort is normal.   Abdominal:      Palpations: Abdomen is soft.   Musculoskeletal:         General: Normal range of motion.   Skin:     General: Skin is warm and dry.   Neurological:      General: No focal deficit present.      Mental Status: She is alert and oriented to person, place, and time.   Psychiatric:         Mood and Affect: Mood normal.        Latest Reference Range & Units 03/04/25 12:46   GLUCOSE 65 - 99 mg/dL 199 (H)   SODIUM 135 - 146 mmol/L 139   POTASSIUM 3.5 - 5.3 mmol/L 4.3   CHLORIDE 98 - 110 mmol/L 104   CARBON DIOXIDE 20 - 32 mmol/L 27   ELECTROLYTE BALANCE 7 - 17 mmol/L (calc) 8   UREA NITROGEN (BUN) 7 - 25 mg/dL 10   CREATININE 0.60 - 1.00 mg/dL 0.76   EGFR > OR = 60 mL/min/1.73m2 82   CALCIUM 8.6 - 10.4 mg/dL 10.2   ALBUMIN 3.6 - 5.1 g/dL 4.4   PROTEIN, TOTAL 6.1 - 8.1 g/dL 6.4   ALKALINE PHOSPHATASE 37 - 153 U/L 37   ALT 6 - 29 U/L 10   AST 10 - 35 U/L 17   BILIRUBIN, TOTAL 0.2 - 1.2 mg/dL 0.4   FERRITIN 16 - 288 ng/mL 106   FOLATE, SERUM ng/mL 15.3   IRON, TOTAL 45 - 160 mcg/dL 138   IRON BINDING CAPACITY 250 - 450 mcg/dL (calc) 394   % SATURATION 16 - 45 % (calc) 35   VITAMIN B12 200 - 1,100 pg/mL 866   COPPER, BLOOD  Pending (IP)   ZINC  Pending (IP)   VITAMIN B1 (THIAMINE), BLOOD, LC/MS/MS  Pending (IP)   PARATHYROID HORMONE, INTACT 16 - 77 pg/mL 152 (H)   VITAMIN D,25-OH,TOTAL,IA 30 - 100 ng/mL  32   WHITE BLOOD CELL COUNT 3.8 - 10.8 Thousand/uL 5.5   RED BLOOD CELL COUNT 3.80 - 5.10 Million/uL 4.66   HEMOGLOBIN 11.7 - 15.5 g/dL 13.7   HEMATOCRIT 35.0 - 45.0 % 44.3   MCV 80.0 - 100.0 fL 95.1   MCH 27.0 - 33.0 pg 29.4   MCHC 32.0 - 36.0 g/dL 30.9 (L)   RDW 11.0 - 15.0 % 12.7   PLATELET COUNT 140 - 400 Thousand/uL 220   MPV 7.5 - 12.5 fL 11.2   ABSOLUTE NEUTROPHILS 1,500 - 7,800 cells/uL 2,921   ABSOLUTE LYMPHOCYTES 850 - 3,900 cells/uL 1,672   ABSOLUTE MONOCYTES 200 - 950 cells/uL 479   ABSOLUTE EOSINOPHILS 15 - 500 cells/uL 358   ABSOLUTE BASOPHILS 0 - 200 cells/uL 72   NEUTROPHILS % 53.1   LYMPHOCYTES % 30.4   MONOCYTES % 8.7   EOSINOPHILS % 6.5   BASOPHILS % 1.3     Assessment/Plan   Problem List Items Addressed This Visit             ICD-10-CM    Gastroesophageal reflux disease K21.9    Relevant Medications    omeprazole (PriLOSEC) 40 mg DR capsule    Hyperparathyroidism (Multi) E21.3    Relevant Orders    Parathyroid Hormone, Intact    Vitamin B 12 deficiency E53.8    Relevant Orders    Vitamin B12    Vitamin D deficiency E55.9    Relevant Orders    Vitamin D 25-Hydroxy,Total (for eval of Vitamin D levels)    History of gastric bypass Z98.84    Intestinal malabsorption - Primary K90.9    Relevant Orders    CBC and Auto Differential    Comprehensive Metabolic Panel    Copper, Blood    Folate    Iron and TIBC    Ferritin    Parathyroid Hormone, Intact    Vitamin B1, Whole Blood    Vitamin B12    Vitamin D 25-Hydroxy,Total (for eval of Vitamin D levels)    Zinc, Serum or Plasma    Copper deficiency E61.0    Relevant Orders    Copper, Blood     I reminded Heather that she needs to add a copper to her supplementation. She will follow up in 1 year with repeat labwork.     Jeanie Zelaya, ROD-CNP 03/06/25 2:23 PM

## 2025-03-09 DIAGNOSIS — M19.90 CHRONIC ARTHRITIS: ICD-10-CM

## 2025-03-11 LAB
25(OH)D3+25(OH)D2 SERPL-MCNC: 32 NG/ML (ref 30–100)
ALBUMIN SERPL-MCNC: 4.4 G/DL (ref 3.6–5.1)
ALP SERPL-CCNC: 37 U/L (ref 37–153)
ALT SERPL-CCNC: 10 U/L (ref 6–29)
ANION GAP SERPL CALCULATED.4IONS-SCNC: 8 MMOL/L (CALC) (ref 7–17)
AST SERPL-CCNC: 17 U/L (ref 10–35)
BASOPHILS # BLD AUTO: 72 CELLS/UL (ref 0–200)
BASOPHILS NFR BLD AUTO: 1.3 %
BILIRUB SERPL-MCNC: 0.4 MG/DL (ref 0.2–1.2)
BUN SERPL-MCNC: 10 MG/DL (ref 7–25)
CALCIUM SERPL-MCNC: 10.2 MG/DL (ref 8.6–10.4)
CHLORIDE SERPL-SCNC: 104 MMOL/L (ref 98–110)
CO2 SERPL-SCNC: 27 MMOL/L (ref 20–32)
COPPER BLD-MCNC: 62 MCG/DL
CREAT SERPL-MCNC: 0.76 MG/DL (ref 0.6–1)
EGFRCR SERPLBLD CKD-EPI 2021: 82 ML/MIN/1.73M2
EOSINOPHIL # BLD AUTO: 358 CELLS/UL (ref 15–500)
EOSINOPHIL NFR BLD AUTO: 6.5 %
ERYTHROCYTE [DISTWIDTH] IN BLOOD BY AUTOMATED COUNT: 12.7 % (ref 11–15)
FERRITIN SERPL-MCNC: 106 NG/ML (ref 16–288)
FOLATE SERPL-MCNC: 15.3 NG/ML
GLUCOSE SERPL-MCNC: 199 MG/DL (ref 65–99)
HCT VFR BLD AUTO: 44.3 % (ref 35–45)
HGB BLD-MCNC: 13.7 G/DL (ref 11.7–15.5)
IRON SATN MFR SERPL: 35 % (CALC) (ref 16–45)
IRON SERPL-MCNC: 138 MCG/DL (ref 45–160)
LYMPHOCYTES # BLD AUTO: 1672 CELLS/UL (ref 850–3900)
LYMPHOCYTES NFR BLD AUTO: 30.4 %
MCH RBC QN AUTO: 29.4 PG (ref 27–33)
MCHC RBC AUTO-ENTMCNC: 30.9 G/DL (ref 32–36)
MCV RBC AUTO: 95.1 FL (ref 80–100)
MONOCYTES # BLD AUTO: 479 CELLS/UL (ref 200–950)
MONOCYTES NFR BLD AUTO: 8.7 %
NEUTROPHILS # BLD AUTO: 2921 CELLS/UL (ref 1500–7800)
NEUTROPHILS NFR BLD AUTO: 53.1 %
PLATELET # BLD AUTO: 220 THOUSAND/UL (ref 140–400)
PMV BLD REES-ECKER: 11.2 FL (ref 7.5–12.5)
POTASSIUM SERPL-SCNC: 4.3 MMOL/L (ref 3.5–5.3)
PROT SERPL-MCNC: 6.4 G/DL (ref 6.1–8.1)
PTH-INTACT SERPL-MCNC: 152 PG/ML (ref 16–77)
RBC # BLD AUTO: 4.66 MILLION/UL (ref 3.8–5.1)
SODIUM SERPL-SCNC: 139 MMOL/L (ref 135–146)
TIBC SERPL-MCNC: 394 MCG/DL (CALC) (ref 250–450)
VIT B1 BLD-SCNC: NORMAL NMOL/L
VIT B12 SERPL-MCNC: 866 PG/ML (ref 200–1100)
WBC # BLD AUTO: 5.5 THOUSAND/UL (ref 3.8–10.8)
ZINC SERPL-MCNC: 70 MCG/DL (ref 60–130)

## 2025-03-11 RX ORDER — TRAMADOL HYDROCHLORIDE 50 MG/1
50 TABLET ORAL DAILY
Qty: 30 TABLET | Refills: 1 | Status: SHIPPED | OUTPATIENT
Start: 2025-03-11 | End: 2025-05-10

## 2025-03-13 ENCOUNTER — APPOINTMENT (OUTPATIENT)
Dept: INFUSION THERAPY | Facility: CLINIC | Age: 75
End: 2025-03-13
Payer: MEDICARE

## 2025-03-17 ENCOUNTER — APPOINTMENT (OUTPATIENT)
Dept: INFUSION THERAPY | Facility: CLINIC | Age: 75
End: 2025-03-17
Payer: MEDICARE

## 2025-03-20 ENCOUNTER — APPOINTMENT (OUTPATIENT)
Dept: INFUSION THERAPY | Facility: CLINIC | Age: 75
End: 2025-03-20
Payer: MEDICARE

## 2025-03-20 VITALS
SYSTOLIC BLOOD PRESSURE: 142 MMHG | BODY MASS INDEX: 26.49 KG/M2 | OXYGEN SATURATION: 100 % | TEMPERATURE: 97.6 F | RESPIRATION RATE: 18 BRPM | WEIGHT: 146 LBS | HEART RATE: 74 BPM | DIASTOLIC BLOOD PRESSURE: 82 MMHG

## 2025-03-20 DIAGNOSIS — M06.09 RHEUMATOID ARTHRITIS OF MULTIPLE SITES WITHOUT RHEUMATOID FACTOR (MULTI): ICD-10-CM

## 2025-03-20 PROCEDURE — 96372 THER/PROPH/DIAG INJ SC/IM: CPT | Performed by: REGISTERED NURSE

## 2025-03-20 RX ORDER — EPINEPHRINE 0.3 MG/.3ML
0.3 INJECTION SUBCUTANEOUS EVERY 5 MIN PRN
OUTPATIENT
Start: 2025-04-10

## 2025-03-20 RX ORDER — FAMOTIDINE 10 MG/ML
20 INJECTION, SOLUTION INTRAVENOUS ONCE AS NEEDED
OUTPATIENT
Start: 2025-04-10

## 2025-03-20 RX ORDER — DIPHENHYDRAMINE HYDROCHLORIDE 50 MG/ML
50 INJECTION, SOLUTION INTRAMUSCULAR; INTRAVENOUS AS NEEDED
OUTPATIENT
Start: 2025-04-10

## 2025-03-20 RX ORDER — ALBUTEROL SULFATE 0.83 MG/ML
3 SOLUTION RESPIRATORY (INHALATION) AS NEEDED
OUTPATIENT
Start: 2025-04-10

## 2025-03-20 ASSESSMENT — ENCOUNTER SYMPTOMS
PALPITATIONS: 0
COUGH: 0
SHORTNESS OF BREATH: 0
BACK PAIN: 1
WOUND: 0
NUMBNESS: 0
DIZZINESS: 0
ARTHRALGIAS: 1
LIGHT-HEADEDNESS: 0
EXTREMITY WEAKNESS: 0
WHEEZING: 0
LEG SWELLING: 0

## 2025-03-20 ASSESSMENT — PAIN SCALES - GENERAL: PAINLEVEL_OUTOF10: 7

## 2025-03-20 NOTE — PATIENT INSTRUCTIONS
Today :We administered certolizumab pegol and denosumab.     For:   1. Rheumatoid arthritis of multiple sites without rheumatoid factor (Multi)         Your next appointment is due in:     4/10/2025     Please read the  Medication Guide that was given to you and reviewed during todays visit.     (Tell all doctors including dentists that you are taking this medication)     Go to the emergency room or call 911 if:  -You have signs of allergic reaction:   -Rash, hives, itching.   -Swollen, blistered, peeling skin.   -Swelling of face, lips, mouth, tongue or throat.   -Tightness of chest, trouble breathing, swallowing or talking     Call your doctor:  - If IV / injection site gets red, warm, swollen, itchy or leaks fluid or pus.     (Leave dressing on your IV site for at least 2 hours and keep area clean and dry  - If you get sick or have symptoms of infection or are not feeling well for any reason.    (Wash your hands often, stay away from people who are sick)  - If you have side effects from your medication that do not go away or are bothersome.     (Refer to the teaching your nurse gave you for side effects to call your doctor about)    - Common side effects may include:  stuffy nose, headache, feeling tired, muscle aches, upset stomach  - Before receiving any vaccines     - Call [facility] at   If:  - You get sick, are on antibiotics, have had a recent vaccine, have surgery or dental work and your doctor wants your visit rescheduled.  - You need to cancel and reschedule your visit for any reason. Call at least 2 days before your visit if you need to cancel.   - Your insurance changes before your next visit.    (We will need to get approval from your new insurance. This can take up to two weeks.)     The [facility] is opened Monday thru Friday. We are closed on weekends and holidays.   Voice mail will take your call if the center is closed. If you leave a message please allow 24  hours for a call back during weekdays. If you leave a message on a weekend/holiday, we will call you back the next business day.    A pharmacist is available Monday - Friday from 8:30AM to 3:30PM to help answer any questions you may have about your prescriptions(s). Please call pharmacy at:    [facility]: [phone]  [facility]: [phone]  [facility]: [phone]

## 2025-03-20 NOTE — PROGRESS NOTES
[facility]   Infusion Clinic Note   Date: 2025   Name: Heather Thurston  : 1950   MRN: [id number]         Reason for Visit: Injections (PROLIA INJECTION AND CIMZIA INJECTION)         Today: We administered certolizumab pegol and denosumab.       Ordered By: Cali Jameson MD       For a Diagnosis of: Rheumatoid arthritis of multiple sites without rheumatoid factor (Multi)       At today's visit patient accompanied by: Self      Today's Vitals:   Vitals:    25 1537   BP: 142/82   Pulse: 74   Resp: 18   Temp: 36.4 °C (97.6 °F)   SpO2: 100%   Weight: 66.2 kg (146 lb)   PainSc:   7   PainLoc: Generalized             Pre - Treatment Checklist:      - Previous reaction to current treatment: no      (Assess patient for the concerns below. Document provider notification as appropriate).  - Active or recent infection with/without current antibiotic use: no  - Recent or planned invasive dental work: no  - Recent or planned surgeries: no  - Recently received or plans to receive vaccinations: no  - Has treatment related toxicities: no  - Any chance may be pregnant:  n/a      Pain: 7   - Is the pain different from normal: no   - Is prescribing Doctor aware:  no      Labs: Reviewed       Fall Risk Screening: More Fall Risk  History of Falling, Immediate or Within 3 Months: No  Secondary Diagnosis: No  Ambulatory Aid: Walks without aid/bedrest/nurse assist  Intravenous Therapy/Heparin Lock: No  Gait/Transferring: Normal/bedrest/immobile  Mental Status: Oriented to own ability  More Fall Risk Score: 0       Review Of Systems:  Review of Systems   Respiratory:  Negative for cough, shortness of breath and wheezing.    Cardiovascular:  Negative for chest pain, leg swelling and palpitations.   Musculoskeletal:  Positive for arthralgias and back pain.   Skin:  Negative for itching, rash and wound.   Neurological:  Negative for dizziness, extremity weakness, light-headedness and  "numbness.         Infusion Readiness:  - Assessment Concerns Related to Infusion: No  - Provider notified: n/a      New Patient Education:    N/A (returning patient for continuation of therapy. Ongoing education provided as needed.)        Treatment Conditions & Drug Specific Questions:    Certolizumab Pegol  (CIMZIA)    (Unless otherwise specified on patient specific therapy plan):     TREATMENT CONDITIONS:  Unless otherwise specified on patient specific therapy plan HOLD treatment and notify provider prior to proceeding with today's infusion if patient with a:  o Positive Hepatitis B Surface Ag  o Positive T-Spot  o NO baseline CBC-D obtained  o NO baseline CMP obtained    No results found for: \"HAGCN\", \"HEPBSURABI\", \"HBSAG\", \"XHAGF\", \"HEPBSAG\", \"EXTHEPBSAG\", \"NONUHSWGH\"   No results found for: \"NONUHFIRE\", \"NONUHSWGH\", \"NONUHFISH\", \"EXTHEPBSAG\"  Lab Results   Component Value Date    TBSIN Negative 07/30/2024      Lab Results   Component Value Date    WBC 5.5 03/04/2025    HGB 13.7 03/04/2025    HCT 44.3 03/04/2025    MCV 95.1 03/04/2025     03/04/2025        Chemistry    Lab Results   Component Value Date/Time     03/04/2025 1246    K 4.3 03/04/2025 1246     03/04/2025 1246    CO2 27 03/04/2025 1246    BUN 10 03/04/2025 1246    CREATININE 0.76 03/04/2025 1246    Lab Results   Component Value Date/Time    CALCIUM 10.2 03/04/2025 1246    ALKPHOS 37 03/04/2025 1246    AST 17 03/04/2025 1246    ALT 10 03/04/2025 1246    BILITOT 0.4 03/04/2025 1246             Patient meets treatment conditions? Yes    DRUG SPECIFIC QUESTIONS:  - Latex Allergy? No     (Caution: Prefilled syringe may include Latex)    - Any new or worsening signs / symptoms of heart failure which may include things like worsening shortness of breath, swelling, fatigue? No   (If yes notify provider before proceeding with today's infusion. New onset or worsening HF have been reported)      REMINDERS:  Recommended " "Vitals/Observation:  Vitals: Obtain vitals prior to injection.  Observation: Patient may leave immediately following injection.   and Denosumab  (PROLIA. XGEVA)    (Unless otherwise specified on patient specific therapy plan):     TREATMENT CONDITIONS:  Unless otherwise specified on patient specific therapy plan HOLD and notify provider prior to proceeding with today's injection if patients:  O Corrected or Serum Calcium LESS THAN 8.6 mg/dL  OR Ionized calcium less than 1.1 mmol/L or  less than 4.7 mg/dL (depending on resulting agency)  o Recent or planned invasive dental procedure (within 4 weeks)    Lab Results   Component Value Date    CALCIUM 10.2 03/04/2025    PHOS 3.7 07/15/2020      No results found for: \"CAION\"    Patient meets treatment conditions? Yes    DRUG SPECIFIC QUESTIONS:  Is the patient taking calcium and vitamin D? Yes  (Recommended)    Pt Instructed on following risks: (1) hypocalcemia, (2) osteonecrosis of the jaw, (3) atypical femoral fractures, (4) serious infections, and (5) dermatologic reactions?  Yes      REMINDER:  PREGNANCY CATEGORY X DRUG. OBTAIN NEGTATIVE PREGNANCY TEST PRIOR TO FIRST INFUSION FOR WOMEN OF CHILDBEARING ABILITY   REMS DRUG    Recommended Vitals/Observation:  Vitals: Obtain vitals prior to injection.  Observation: Patient may leave immediately following injection.        Weight Based Drug Calculations:    WEIGHT BASED DRUGS: NOT APPLICABLE / FLAT DOSE       Post Treatment: Patient tolerated treatment without issue and was discharged in no apparent distress.      Note Authored / Patient Cared for By: Lashawn Jeffrey RN       "

## 2025-03-28 PROCEDURE — RXMED WILLOW AMBULATORY MEDICATION CHARGE

## 2025-03-31 ENCOUNTER — PHARMACY VISIT (OUTPATIENT)
Dept: PHARMACY | Facility: CLINIC | Age: 75
End: 2025-03-31
Payer: COMMERCIAL

## 2025-04-10 ENCOUNTER — APPOINTMENT (OUTPATIENT)
Dept: INFUSION THERAPY | Facility: CLINIC | Age: 75
End: 2025-04-10
Payer: MEDICARE

## 2025-04-14 ENCOUNTER — APPOINTMENT (OUTPATIENT)
Dept: INFUSION THERAPY | Facility: CLINIC | Age: 75
End: 2025-04-14
Payer: MEDICARE

## 2025-04-14 VITALS
OXYGEN SATURATION: 94 % | RESPIRATION RATE: 16 BRPM | DIASTOLIC BLOOD PRESSURE: 63 MMHG | HEART RATE: 81 BPM | BODY MASS INDEX: 26.16 KG/M2 | TEMPERATURE: 98 F | WEIGHT: 144.2 LBS | SYSTOLIC BLOOD PRESSURE: 112 MMHG

## 2025-04-14 DIAGNOSIS — M06.09 RHEUMATOID ARTHRITIS OF MULTIPLE SITES WITHOUT RHEUMATOID FACTOR (MULTI): ICD-10-CM

## 2025-04-14 PROCEDURE — 96372 THER/PROPH/DIAG INJ SC/IM: CPT | Performed by: NURSE PRACTITIONER

## 2025-04-14 RX ORDER — FAMOTIDINE 10 MG/ML
20 INJECTION, SOLUTION INTRAVENOUS ONCE AS NEEDED
OUTPATIENT
Start: 2025-04-17

## 2025-04-14 RX ORDER — DIPHENHYDRAMINE HYDROCHLORIDE 50 MG/ML
50 INJECTION, SOLUTION INTRAMUSCULAR; INTRAVENOUS AS NEEDED
OUTPATIENT
Start: 2025-04-17

## 2025-04-14 RX ORDER — ALBUTEROL SULFATE 0.83 MG/ML
3 SOLUTION RESPIRATORY (INHALATION) AS NEEDED
Status: DISCONTINUED | OUTPATIENT
Start: 2025-04-14 | End: 2025-04-14 | Stop reason: HOSPADM

## 2025-04-14 RX ORDER — FAMOTIDINE 10 MG/ML
20 INJECTION, SOLUTION INTRAVENOUS ONCE AS NEEDED
Status: DISCONTINUED | OUTPATIENT
Start: 2025-04-14 | End: 2025-04-14 | Stop reason: HOSPADM

## 2025-04-14 RX ORDER — EPINEPHRINE 0.3 MG/.3ML
0.3 INJECTION SUBCUTANEOUS EVERY 5 MIN PRN
OUTPATIENT
Start: 2025-04-17

## 2025-04-14 RX ORDER — EPINEPHRINE 0.3 MG/.3ML
0.3 INJECTION SUBCUTANEOUS EVERY 5 MIN PRN
Status: DISCONTINUED | OUTPATIENT
Start: 2025-04-14 | End: 2025-04-14 | Stop reason: HOSPADM

## 2025-04-14 RX ORDER — ALBUTEROL SULFATE 0.83 MG/ML
3 SOLUTION RESPIRATORY (INHALATION) AS NEEDED
OUTPATIENT
Start: 2025-04-17

## 2025-04-14 RX ORDER — DIPHENHYDRAMINE HYDROCHLORIDE 50 MG/ML
50 INJECTION, SOLUTION INTRAMUSCULAR; INTRAVENOUS AS NEEDED
Status: DISCONTINUED | OUTPATIENT
Start: 2025-04-14 | End: 2025-04-14 | Stop reason: HOSPADM

## 2025-04-14 ASSESSMENT — ENCOUNTER SYMPTOMS
NUMBNESS: 0
LIGHT-HEADEDNESS: 1
COUGH: 0
WOUND: 0
DIZZINESS: 1
SHORTNESS OF BREATH: 0
LEG SWELLING: 0
PALPITATIONS: 0
WHEEZING: 0
EXTREMITY WEAKNESS: 1

## 2025-04-14 NOTE — PATIENT INSTRUCTIONS
Today :We administered certolizumab pegol. Monthly Cimzia 400mg (total) subcutaneous injection.  200mg in the left upper abdomen  200mg in the right upper abdomen    For:   1. Rheumatoid arthritis of multiple sites without rheumatoid factor (Multi)       Your next appointment is due in:  1 month        Please read the  Medication Guide that was given to you and reviewed during todays visit.     (Tell all doctors including dentists that you are taking this medication)     Go to the emergency room or call 911 if:  -You have signs of allergic reaction:   -Rash, hives, itching.   -Swollen, blistered, peeling skin.   -Swelling of face, lips, mouth, tongue or throat.   -Tightness of chest, trouble breathing, swallowing or talking     Call your doctor:  - If injection sites gets red, warm, swollen, itchy or leaks fluid or pus.     (Leave band aid on your injection sites for at least 2 hours and keep area clean and dry  - If you get sick or have symptoms of infection or are not feeling well for any reason.    (Wash your hands often, stay away from people who are sick)  - If you have side effects from your medication that do not go away or are bothersome.     (Refer to the teaching your nurse gave you for side effects to call your doctor about)    - Common side effects may include:  stuffy nose, headache, feeling tired, muscle aches, upset stomach  - Before receiving any vaccines     - [facility] at   If:  - You get sick, are on antibiotics, have had a recent vaccine, have surgery or dental work and your doctor wants your visit rescheduled.  - You need to cancel and reschedule your visit for any reason. Call at least 2 days before your visit if you need to cancel.   - Your insurance changes before your next visit.    (We will need to get approval from your new insurance. This can take up to two weeks.)     [facility] is opened Monday thru Friday. We are closed on weekends and  holidays.   Voice mail will take your call if the center is closed. If you leave a message please allow 24 hours for a call back during weekdays. If you leave a message on a weekend/holiday, we will call you back the next business day.    A pharmacist is available Monday - Friday from 8:30AM to 3:30PM to help answer any questions you may have about your prescriptions(s). Please call pharmacy at:    [facility]: [phone]  [facility]: [phone]  [facility]: [phone]

## 2025-04-14 NOTE — PROGRESS NOTES
[facility]   Infusion Clinic Note   Date: 2025   Name: Heather Thurston  : 1950   MRN: [id number]          Reason for Visit: Follow-up and Injections (Monthly Cimzia 400mg (total) subcutaneous injection/)         Today: We administered certolizumab pegol.       Visit Type: INJECTION       Ordered By:  Cali Jameson MD       Accompanied by:Self       Diagnosis: Rheumatoid arthritis of multiple sites without rheumatoid factor (Multi)        Allergies:   Allergies as of 2025 - Reviewed 2025   Allergen Reaction Noted    Atropine Anaphylaxis, Nausea And Vomiting, Palpitations, and Rash 2023    Codeine Itching and Unknown 2023    Ezetimibe Unknown 2023    Iodinated contrast media Other and Syncope 2023    Iodine Nausea/vomiting 10/16/2019    Lisinopril Cough 2023    Metformin hcl Diarrhea 2023    Simvastatin Unknown 2023    Adhesive tape-silicones Rash 2023          Current Medications has a current medication list which includes the following prescription(s): acetaminophen, amitriptyline, ascorbic acid, calcium carbonate, calcium citrate, cimzia, cholecalciferol, cyanocobalamin, prolia, dicyclomine, trulicity, famotidine, gabapentin, leflunomide, multivit-minerals/folic acid, omeprazole, oxybutynin, sertraline, sulfasalazine, and tramadol, and the following Facility-Administered Medications: albuterol, dextrose, diphenhydramine, epinephrine, famotidine pf, methylprednisolone sod succinate, and sodium chloride.       Vitals:   Vitals:    25 1425   BP: 112/63   Pulse: 81   Resp: 16   Temp: 36.7 °C (98 °F)   TempSrc: Temporal   SpO2: 94%   Weight: 65.4 kg (144 lb 3.2 oz)             Infusion Pre-procedure Checklist:   - Allergies reviewed: yes   - Medications reviewed: yes       - Previous reaction to current treatment: no      Assess patient for the concerns below. Document provider notification as appropriate.  -  Active or recent infection with/without current antibiotic use: no  - Recent or planned invasive dental work: no  - Recent or planned surgeries:  no  - Recently received or plans to receive vaccinations: Yes B-12 today.  CNP aware. OK to give.    - Has treatment related toxicities: no  - Is pregnant:  no      Pain: 5/10 Generalized due to arthritis   - Is the pain different from normal: no   - Is your Doctor aware:  n/a                Fall Risk Screening: More Fall Risk  History of Falling, Immediate or Within 3 Months: No  Secondary Diagnosis: No  Ambulatory Aid: Walks without aid/bedrest/nurse assist  Intravenous Therapy/Heparin Lock: No  Gait/Transferring: Normal/bedrest/immobile  Mental Status: Oriented to own ability  More Fall Risk Score: 0       Review Of Systems:  Review of Systems   Respiratory:  Negative for cough, shortness of breath and wheezing.    Cardiovascular:  Negative for chest pain, leg swelling and palpitations.   Skin:  Negative for itching, rash and wound.        Patient has minimal itching   Neurological:  Positive for dizziness, extremity weakness and light-headedness. Negative for numbness.        Patient states she has been dizzy from time to time.     When she becomes dizzy she feels weakness in her legs         ROS completed? Yes      Infusion Readiness:  - Assessment Concerns Related to Infusion: No  - Provider notified: no      Document Below Only If Indicated:   New Patient Education:    N/A (returning patient for continuation of therapy. Ongoing education provided as needed.)        Treatment Conditions & Drug Specific Questions:    Certolizumab Pegol  (CIMZIA)    (Unless otherwise specified on patient specific therapy plan):     TREATMENT CONDITIONS:  Unless otherwise specified on patient specific therapy plan HOLD treatment and notify provider prior to proceeding with today's infusion if patient with a:  o Positive Hepatitis B Surface Ag  o Positive T-Spot  o NO baseline CBC-D  obtained  o NO baseline CMP obtained    Lab Results   Component Value Date    TBSIN Negative 07/30/2024      Lab Results   Component Value Date    WBC 5.5 03/04/2025    HGB 13.7 03/04/2025    HCT 44.3 03/04/2025    MCV 95.1 03/04/2025     03/04/2025        Chemistry    Lab Results   Component Value Date/Time     03/04/2025 1246    K 4.3 03/04/2025 1246     03/04/2025 1246    CO2 27 03/04/2025 1246    BUN 10 03/04/2025 1246    CREATININE 0.76 03/04/2025 1246    Lab Results   Component Value Date/Time    CALCIUM 10.2 03/04/2025 1246    ALKPHOS 37 03/04/2025 1246    AST 17 03/04/2025 1246    ALT 10 03/04/2025 1246    BILITOT 0.4 03/04/2025 1246           Labs reviewed and patient meets treatment conditions? Yes    DRUG SPECIFIC QUESTIONS:  - Latex Allergy? No     (Caution: Prefilled syringe may include Latex)    - Any new or worsening signs / symptoms of heart failure which may include things like worsening shortness of breath, swelling, fatigue? No   (If yes notify provider before proceeding with today's infusion. New onset or worsening HF have been reported)      REMINDERS:  Recommended Vitals/Observation:  Vitals: Obtain vitals prior to injection.  Observation: Patient may leave immediately following injection.        Weight Based Drug Calculations:    WEIGHT BASED DRUGS: NOT APPLICABLE / FLAT DOSE          Initiated By: Elle Cortez LPN  _________________________________________________________________________    Note authored and patient cared for by: Elle Cortez LPN  Note/Encounter reviewed by: Winifred GUILLEN NP. This provider on site at time of patient injection. Staff to notify this provider of any questions, concerns, abnormals or issues during encounter. No issues reported. Pt. tolerated without difficulty. Pt. not independently evaluated by this provider during today's encounter

## 2025-04-24 PROCEDURE — RXMED WILLOW AMBULATORY MEDICATION CHARGE

## 2025-04-30 ENCOUNTER — PHARMACY VISIT (OUTPATIENT)
Dept: PHARMACY | Facility: CLINIC | Age: 75
End: 2025-04-30
Payer: COMMERCIAL

## 2025-05-12 ENCOUNTER — APPOINTMENT (OUTPATIENT)
Dept: INFUSION THERAPY | Facility: CLINIC | Age: 75
End: 2025-05-12
Payer: MEDICARE

## 2025-05-12 VITALS
SYSTOLIC BLOOD PRESSURE: 122 MMHG | TEMPERATURE: 97 F | BODY MASS INDEX: 26.24 KG/M2 | HEART RATE: 85 BPM | RESPIRATION RATE: 16 BRPM | WEIGHT: 144.6 LBS | DIASTOLIC BLOOD PRESSURE: 91 MMHG | OXYGEN SATURATION: 100 %

## 2025-05-12 DIAGNOSIS — M06.09 RHEUMATOID ARTHRITIS OF MULTIPLE SITES WITHOUT RHEUMATOID FACTOR (MULTI): ICD-10-CM

## 2025-05-12 PROCEDURE — 96372 THER/PROPH/DIAG INJ SC/IM: CPT | Performed by: NURSE PRACTITIONER

## 2025-05-12 RX ORDER — FAMOTIDINE 10 MG/ML
20 INJECTION, SOLUTION INTRAVENOUS ONCE AS NEEDED
OUTPATIENT
Start: 2025-06-09

## 2025-05-12 RX ORDER — DIPHENHYDRAMINE HYDROCHLORIDE 50 MG/ML
50 INJECTION, SOLUTION INTRAMUSCULAR; INTRAVENOUS AS NEEDED
OUTPATIENT
Start: 2025-06-09

## 2025-05-12 RX ORDER — ALBUTEROL SULFATE 0.83 MG/ML
3 SOLUTION RESPIRATORY (INHALATION) AS NEEDED
OUTPATIENT
Start: 2025-06-09

## 2025-05-12 RX ORDER — EPINEPHRINE 0.3 MG/.3ML
0.3 INJECTION SUBCUTANEOUS EVERY 5 MIN PRN
OUTPATIENT
Start: 2025-06-09

## 2025-05-12 ASSESSMENT — ENCOUNTER SYMPTOMS
LIGHT-HEADEDNESS: 1
LEG SWELLING: 0
DIZZINESS: 1
WOUND: 0
NUMBNESS: 0
PALPITATIONS: 0
EXTREMITY WEAKNESS: 0
COUGH: 1
SHORTNESS OF BREATH: 0
WHEEZING: 0

## 2025-05-12 NOTE — PATIENT INSTRUCTIONS
Today :We administered certolizumab pegol. Monthly Cimzia 400mg (total) subcutaneous injection.  200mg in the left lower abdomen  200mg in the right lower abdomen    For:   1. Rheumatoid arthritis of multiple sites without rheumatoid factor (Multi)       Your next appointment is due in:  1 month        Please read the  Medication Guide that was given to you and reviewed during todays visit.     (Tell all doctors including dentists that you are taking this medication)     Go to the emergency room or call 911 if:  -You have signs of allergic reaction:   -Rash, hives, itching.   -Swollen, blistered, peeling skin.   -Swelling of face, lips, mouth, tongue or throat.   -Tightness of chest, trouble breathing, swallowing or talking     Call your doctor:  - If injection sites gets red, warm, swollen, itchy or leaks fluid or pus.     (Leave band aids on your injection sites for at least 2 hours and keep area clean and dry  - If you get sick or have symptoms of infection or are not feeling well for any reason.    (Wash your hands often, stay away from people who are sick)  - If you have side effects from your medication that do not go away or are bothersome.     (Refer to the teaching your nurse gave you for side effects to call your doctor about)    - Common side effects may include:  stuffy nose, headache, feeling tired, muscle aches, upset stomach  - Before receiving any vaccines     - Call [facility] at   If:  - You get sick, are on antibiotics, have had a recent vaccine, have surgery or dental work and your doctor wants your visit rescheduled.  - You need to cancel and reschedule your visit for any reason. Call at least 2 days before your visit if you need to cancel.   - Your insurance changes before your next visit.    (We will need to get approval from your new insurance. This can take up to two weeks.)     [facility] is opened Monday thru Friday. We are closed on weekends and  holidays.   Voice mail will take your call if the center is closed. If you leave a message please allow 24 hours for a call back during weekdays. If you leave a message on a weekend/holiday, we will call you back the next business day.    A pharmacist is available Monday - Friday from 8:30AM to 3:30PM to help answer any questions you may have about your prescriptions(s). Please call pharmacy at:    [facility]: [phone]  [facility]: [phone]  [facility]: [phone]

## 2025-05-12 NOTE — PROGRESS NOTES
[facility]   Infusion Clinic Note   Date: May 12, 2025   Name: Heather Thurston  : 1950   MRN: [id number]          Reason for Visit: Follow-up and Injections (Monthly Cimzia 400mg (total) subcutaneous injection/)         Today: We administered certolizumab pegol.       Visit Type: INJECTION       Ordered By:  Ludivina Chowdhury M.D.       Accompanied by:Self       Diagnosis: Rheumatoid arthritis of multiple sites without rheumatoid factor (Multi)        Allergies:   Allergies as of 2025 - Reviewed 2025   Allergen Reaction Noted    Atropine Anaphylaxis, Nausea And Vomiting, Palpitations, and Rash 2023    Codeine Itching and Unknown 2023    Ezetimibe Unknown 2023    Iodinated contrast media Other and Syncope 2023    Iodine Nausea/vomiting 10/16/2019    Lisinopril Cough 2023    Metformin hcl Diarrhea 2023    Simvastatin Unknown 2023    Adhesive tape-silicones Rash 2023          Current Medications has a current medication list which includes the following prescription(s): acetaminophen, amitriptyline, ascorbic acid, calcium carbonate, calcium citrate, cimzia, cholecalciferol, cyanocobalamin, prolia, dicyclomine, trulicity, famotidine, gabapentin, leflunomide, multivit-minerals/folic acid, omeprazole, oxybutynin, sertraline, and sulfasalazine.       Vitals:   Vitals:    25 1504   BP: (!) 122/91   Pulse: 85   Resp: 16   Temp: 36.1 °C (97 °F)   TempSrc: Temporal   SpO2: 100%   Weight: 65.6 kg (144 lb 9.6 oz)             Infusion Pre-procedure Checklist:   - Allergies reviewed: yes   - Medications reviewed: yes       - Previous reaction to current treatment: no      Assess patient for the concerns below. Document provider notification as appropriate.  - Active or recent infection with/without current antibiotic use: no  - Recent or planned invasive dental work: no  - Recent or planned surgeries:  no  - Recently received or plans  to receive vaccination no    - Has treatment related toxicities: no  - Is pregnant:  no      Pain: 6/10 Generalized due to arthritis   - Is the pain different from normal: no   - Is your Doctor aware:  n/a                Fall Risk Screening: More Fall Risk  History of Falling, Immediate or Within 3 Months: Yes (Fell off a step ladder.   Cut her hands.)  Secondary Diagnosis: No  Ambulatory Aid: Walks without aid/bedrest/nurse assist  Intravenous Therapy/Heparin Lock: No  Gait/Transferring: Normal/bedrest/immobile  Mental Status: Oriented to own ability  More Fall Risk Score: 25       Review Of Systems:  Review of Systems   Respiratory:  Positive for cough. Negative for shortness of breath and wheezing.         Patient had a runny nose, congestion and a cough.   Cardiovascular:  Negative for chest pain, leg swelling and palpitations.   Skin:  Negative for itching, rash and wound.        Patient has minimal itching   Neurological:  Positive for dizziness and light-headedness. Negative for extremity weakness and numbness.        Patient states she has been dizzy from time to time.            ROS completed? Yes      Infusion Readiness:  - Assessment Concerns Related to Infusion: No  - Provider notified: no      Document Below Only If Indicated:   New Patient Education:    N/A (returning patient for continuation of therapy. Ongoing education provided as needed.)        Treatment Conditions & Drug Specific Questions:    Certolizumab Pegol  (CIMZIA)    (Unless otherwise specified on patient specific therapy plan):     TREATMENT CONDITIONS:  Unless otherwise specified on patient specific therapy plan HOLD treatment and notify provider prior to proceeding with today's infusion if patient with a:  o Positive Hepatitis B Surface Ag  o Positive T-Spot  o NO baseline CBC-D obtained  o NO baseline CMP obtained    Lab Results   Component Value Date    TBSIN Negative 07/30/2024      Lab Results   Component Value Date    WBC 5.5  03/04/2025    HGB 13.7 03/04/2025    HCT 44.3 03/04/2025    MCV 95.1 03/04/2025     03/04/2025        Chemistry    Lab Results   Component Value Date/Time     03/04/2025 1246    K 4.3 03/04/2025 1246     03/04/2025 1246    CO2 27 03/04/2025 1246    BUN 10 03/04/2025 1246    CREATININE 0.76 03/04/2025 1246    Lab Results   Component Value Date/Time    CALCIUM 10.2 03/04/2025 1246    ALKPHOS 37 03/04/2025 1246    AST 17 03/04/2025 1246    ALT 10 03/04/2025 1246    BILITOT 0.4 03/04/2025 1246           Labs reviewed and patient meets treatment conditions? Yes    DRUG SPECIFIC QUESTIONS:  - Latex Allergy? No     (Caution: Prefilled syringe may include Latex)    - Any new or worsening signs / symptoms of heart failure which may include things like worsening shortness of breath, swelling, fatigue? No   (If yes notify provider before proceeding with today's infusion. New onset or worsening HF have been reported)      REMINDERS:  Recommended Vitals/Observation:  Vitals: Obtain vitals prior to injection.  Observation: Patient may leave immediately following injection.        Weight Based Drug Calculations:    WEIGHT BASED DRUGS: NOT APPLICABLE / FLAT DOSE          Initiated By: Elle Cortez LPN  _________________________________________________________________________    Note authored and patient cared for by: Elle Cortez LPN  Note/Encounter reviewed by: Winifred GUILLEN NP. This provider on site at time of patient injection. Staff to notify this provider of any questions, concerns, abnormals or issues during encounter. No issues reported. Pt. tolerated without difficulty. Pt. not independently evaluated by this provider during today's encounter

## 2025-05-21 ENCOUNTER — PHARMACY VISIT (OUTPATIENT)
Dept: PHARMACY | Facility: CLINIC | Age: 75
End: 2025-05-21
Payer: COMMERCIAL

## 2025-05-21 PROCEDURE — RXMED WILLOW AMBULATORY MEDICATION CHARGE

## 2025-05-27 DIAGNOSIS — M19.90 CHRONIC ARTHRITIS: ICD-10-CM

## 2025-05-27 DIAGNOSIS — M79.2 NEUROPATHIC PAIN: Primary | ICD-10-CM

## 2025-05-27 RX ORDER — SERTRALINE HYDROCHLORIDE 50 MG/1
50 TABLET, FILM COATED ORAL DAILY
Qty: 90 TABLET | Refills: 3 | Status: SHIPPED | OUTPATIENT
Start: 2025-05-27 | End: 2026-05-27

## 2025-05-28 RX ORDER — TRAMADOL HYDROCHLORIDE 50 MG/1
50 TABLET, FILM COATED ORAL DAILY
Qty: 30 TABLET | Refills: 1 | Status: SHIPPED | OUTPATIENT
Start: 2025-05-28

## 2025-06-09 ENCOUNTER — APPOINTMENT (OUTPATIENT)
Dept: INFUSION THERAPY | Facility: CLINIC | Age: 75
End: 2025-06-09
Payer: MEDICARE

## 2025-06-11 LAB
25(OH)D3+25(OH)D2 SERPL-MCNC: 34 NG/ML (ref 30–100)
ALBUMIN SERPL-MCNC: 4.2 G/DL (ref 3.6–5.1)
ALBUMIN/CREAT UR: 8 MG/G CREAT
ALP SERPL-CCNC: 32 U/L (ref 37–153)
ALT SERPL-CCNC: 13 U/L (ref 6–29)
ANION GAP SERPL CALCULATED.4IONS-SCNC: 7 MMOL/L (CALC) (ref 7–17)
AST SERPL-CCNC: 17 U/L (ref 10–35)
BASOPHILS # BLD AUTO: 91 CELLS/UL (ref 0–200)
BASOPHILS NFR BLD AUTO: 1.9 %
BILIRUB SERPL-MCNC: 0.5 MG/DL (ref 0.2–1.2)
BUN SERPL-MCNC: 15 MG/DL (ref 7–25)
CALCIUM SERPL-MCNC: 10 MG/DL (ref 8.6–10.4)
CHLORIDE SERPL-SCNC: 101 MMOL/L (ref 98–110)
CHOLEST SERPL-MCNC: 185 MG/DL
CHOLEST/HDLC SERPL: 3.1 (CALC)
CO2 SERPL-SCNC: 32 MMOL/L (ref 20–32)
CREAT SERPL-MCNC: 0.74 MG/DL (ref 0.6–1)
CREAT UR-MCNC: 114 MG/DL (ref 20–275)
EGFRCR SERPLBLD CKD-EPI 2021: 84 ML/MIN/1.73M2
EOSINOPHIL # BLD AUTO: 322 CELLS/UL (ref 15–500)
EOSINOPHIL NFR BLD AUTO: 6.7 %
ERYTHROCYTE [DISTWIDTH] IN BLOOD BY AUTOMATED COUNT: 13 % (ref 11–15)
EST. AVERAGE GLUCOSE BLD GHB EST-MCNC: 120 MG/DL
EST. AVERAGE GLUCOSE BLD GHB EST-SCNC: 6.6 MMOL/L
GLUCOSE SERPL-MCNC: 115 MG/DL (ref 65–99)
HBA1C MFR BLD: 5.8 %
HCT VFR BLD AUTO: 45.7 % (ref 35–45)
HDLC SERPL-MCNC: 60 MG/DL
HGB BLD-MCNC: 14 G/DL (ref 11.7–15.5)
LDLC SERPL CALC-MCNC: 102 MG/DL (CALC)
LYMPHOCYTES # BLD AUTO: 2122 CELLS/UL (ref 850–3900)
LYMPHOCYTES NFR BLD AUTO: 44.2 %
MCH RBC QN AUTO: 29.5 PG (ref 27–33)
MCHC RBC AUTO-ENTMCNC: 30.6 G/DL (ref 32–36)
MCV RBC AUTO: 96.2 FL (ref 80–100)
MICROALBUMIN UR-MCNC: 0.9 MG/DL
MONOCYTES # BLD AUTO: 408 CELLS/UL (ref 200–950)
MONOCYTES NFR BLD AUTO: 8.5 %
NEUTROPHILS # BLD AUTO: 1858 CELLS/UL (ref 1500–7800)
NEUTROPHILS NFR BLD AUTO: 38.7 %
NONHDLC SERPL-MCNC: 125 MG/DL (CALC)
PLATELET # BLD AUTO: 215 THOUSAND/UL (ref 140–400)
PMV BLD REES-ECKER: 10.4 FL (ref 7.5–12.5)
POTASSIUM SERPL-SCNC: 4.4 MMOL/L (ref 3.5–5.3)
PROT SERPL-MCNC: 6.5 G/DL (ref 6.1–8.1)
RBC # BLD AUTO: 4.75 MILLION/UL (ref 3.8–5.1)
SODIUM SERPL-SCNC: 140 MMOL/L (ref 135–146)
TRIGL SERPL-MCNC: 125 MG/DL
TSH SERPL-ACNC: 3.62 MIU/L (ref 0.4–4.5)
WBC # BLD AUTO: 4.8 THOUSAND/UL (ref 3.8–10.8)

## 2025-06-13 ENCOUNTER — OFFICE VISIT (OUTPATIENT)
Dept: PRIMARY CARE | Facility: CLINIC | Age: 75
End: 2025-06-13
Payer: MEDICARE

## 2025-06-17 ENCOUNTER — APPOINTMENT (OUTPATIENT)
Dept: INFUSION THERAPY | Facility: CLINIC | Age: 75
End: 2025-06-17
Payer: MEDICARE

## 2025-06-17 VITALS
RESPIRATION RATE: 18 BRPM | HEART RATE: 74 BPM | TEMPERATURE: 97.5 F | DIASTOLIC BLOOD PRESSURE: 69 MMHG | OXYGEN SATURATION: 97 % | SYSTOLIC BLOOD PRESSURE: 117 MMHG

## 2025-06-17 DIAGNOSIS — M06.09 RHEUMATOID ARTHRITIS OF MULTIPLE SITES WITHOUT RHEUMATOID FACTOR (MULTI): ICD-10-CM

## 2025-06-17 PROCEDURE — 96372 THER/PROPH/DIAG INJ SC/IM: CPT | Performed by: NURSE PRACTITIONER

## 2025-06-17 RX ORDER — FAMOTIDINE 10 MG/ML
20 INJECTION, SOLUTION INTRAVENOUS ONCE AS NEEDED
OUTPATIENT
Start: 2025-07-07

## 2025-06-17 RX ORDER — DIPHENHYDRAMINE HYDROCHLORIDE 50 MG/ML
50 INJECTION, SOLUTION INTRAMUSCULAR; INTRAVENOUS AS NEEDED
OUTPATIENT
Start: 2025-07-07

## 2025-06-17 RX ORDER — EPINEPHRINE 0.3 MG/.3ML
0.3 INJECTION SUBCUTANEOUS EVERY 5 MIN PRN
OUTPATIENT
Start: 2025-07-07

## 2025-06-17 RX ORDER — ALBUTEROL SULFATE 0.83 MG/ML
3 SOLUTION RESPIRATORY (INHALATION) AS NEEDED
OUTPATIENT
Start: 2025-07-07

## 2025-06-17 ASSESSMENT — ENCOUNTER SYMPTOMS
WOUND: 0
DIZZINESS: 0
EXTREMITY WEAKNESS: 0
NUMBNESS: 0
LIGHT-HEADEDNESS: 0
PALPITATIONS: 0
COUGH: 0
WHEEZING: 0
SHORTNESS OF BREATH: 0
LEG SWELLING: 0

## 2025-06-17 NOTE — PATIENT INSTRUCTIONS
Today :We administered certolizumab pegol. Monthly Cimzia 400mg (total) subcutaneous injection.  200mg in the right upper abdomen  200mg in the right lower abdomen    For:   1. Rheumatoid arthritis of multiple sites without rheumatoid factor (Multi)       Your next appointment is due in:  1 month        Please read the  Medication Guide that was given to you and reviewed during todays visit.     (Tell all doctors including dentists that you are taking this medication)     Go to the emergency room or call 911 if:  -You have signs of allergic reaction:   -Rash, hives, itching.   -Swollen, blistered, peeling skin.   -Swelling of face, lips, mouth, tongue or throat.   -Tightness of chest, trouble breathing, swallowing or talking     Call your doctor:  - If injection site gets red, warm, swollen, itchy or leaks fluid or pus.     (Leave band aid on your injection site for at least 2 hours and keep area clean and dry  - If you get sick or have symptoms of infection or are not feeling well for any reason.    (Wash your hands often, stay away from people who are sick)  - If you have side effects from your medication that do not go away or are bothersome.     (Refer to the teaching your nurse gave you for side effects to call your doctor about)    - Common side effects may include:  stuffy nose, headache, feeling tired, muscle aches, upset stomach  - Before receiving any vaccines     - Call [facility] at   If:  - You get sick, are on antibiotics, have had a recent vaccine, have surgery or dental work and your doctor wants your visit rescheduled.  - You need to cancel and reschedule your visit for any reason. Call at least 2 days before your visit if you need to cancel.   - Your insurance changes before your next visit.    (We will need to get approval from your new insurance. This can take up to two weeks.)     [facility] is opened Monday thru Friday. We are closed on weekends and  holidays.   Voice mail will take your call if the center is closed. If you leave a message please allow 24 hours for a call back during weekdays. If you leave a message on a weekend/holiday, we will call you back the next business day.    A pharmacist is available Monday - Friday from 8:30AM to 3:30PM to help answer any questions you may have about your prescriptions(s). Please call pharmacy at:    [facility]: [phone]  [facility]: [phone]  [facility]: [phone]

## 2025-06-17 NOTE — PROGRESS NOTES
[facility]   Infusion Clinic Note   Date: 2025   Name: Heather Thurston  : 1950   MRN: [id number]          Reason for Visit: Follow-up and Injections (Monthly Cimzia 400mg (total) subcutaneous injection/)         Today: We administered certolizumab pegol.       Visit Type: INJECTION       Ordered By:  Ludivina Chowdhury M.D.       Accompanied by:Self       Diagnosis: Rheumatoid arthritis of multiple sites without rheumatoid factor (Multi)        Allergies:   Allergies as of 2025 - Reviewed 2025   Allergen Reaction Noted    Atropine Anaphylaxis, Nausea And Vomiting, Palpitations, and Rash 2023    Codeine Itching and Unknown 2023    Ezetimibe Unknown 2023    Iodinated contrast media Other and Syncope 2023    Iodine Nausea/vomiting 10/16/2019    Lisinopril Cough 2023    Metformin hcl Diarrhea 2023    Simvastatin Unknown 2023    Adhesive tape-silicones Rash 2023          Current Medications has a current medication list which includes the following prescription(s): acetaminophen, amitriptyline, ascorbic acid, calcium carbonate, calcium citrate, cimzia, cholecalciferol, cyanocobalamin, prolia, dicyclomine, trulicity, famotidine, gabapentin, leflunomide, multivit-minerals/folic acid, omeprazole, oxybutynin, sertraline, sulfasalazine, and tramadol.       Vitals:   Vitals:    25 1557   BP: 117/69   Pulse: 74   Resp: 18   Temp: 36.4 °C (97.5 °F)   TempSrc: Temporal   SpO2: 97%             Infusion Pre-procedure Checklist:   - Allergies reviewed: yes   - Medications reviewed: yes       - Previous reaction to current treatment: no      Assess patient for the concerns below. Document provider notification as appropriate.  - Active or recent infection with/without current antibiotic use: Yes.  Patient took ATB at the dentist last week for a pre-existing condition.   (Knee surgery)  - Recent or planned invasive dental work:  Yes.  Cavities  - Recent or planned surgeries:  no  - Recently received or plans to receive vaccination no    - Has treatment related toxicities: no  - Is pregnant:  no      Pain: 5/10 Generalized pain due to arthritis   - Is the pain different from normal: no   - Is your Doctor aware:  n/a                Fall Risk Screening: Derik Fall Risk  History of Falling, Immediate or Within 3 Months: No  Secondary Diagnosis: No  Ambulatory Aid: Walks without aid/bedrest/nurse assist  Intravenous Therapy/Heparin Lock: No  Gait/Transferring: Normal/bedrest/immobile  Mental Status: Oriented to own ability  More Fall Risk Score: 0       Review Of Systems:  Review of Systems   Respiratory:  Negative for cough, shortness of breath and wheezing.    Cardiovascular:  Negative for chest pain, leg swelling and palpitations.   Skin:  Negative for itching, rash and wound.        Patient has minimal itching   Neurological:  Negative for dizziness, extremity weakness, light-headedness and numbness.        .            ROS completed? Yes      Infusion Readiness:  - Assessment Concerns Related to Infusion: No  - Provider notified: no      Document Below Only If Indicated:   New Patient Education:    N/A (returning patient for continuation of therapy. Ongoing education provided as needed.)        Treatment Conditions & Drug Specific Questions:    Certolizumab Pegol  (CIMZIA)    (Unless otherwise specified on patient specific therapy plan):     TREATMENT CONDITIONS:  Unless otherwise specified on patient specific therapy plan HOLD treatment and notify provider prior to proceeding with today's infusion if patient with a:  o Positive Hepatitis B Surface Ag  o Positive T-Spot  o NO baseline CBC-D obtained  o NO baseline CMP obtained    Lab Results   Component Value Date    TBSIN Negative 07/30/2024      Lab Results   Component Value Date    WBC 4.8 06/10/2025    HGB 14.0 06/10/2025    HCT 45.7 (H) 06/10/2025    MCV 96.2 06/10/2025      06/10/2025        Chemistry    Lab Results   Component Value Date/Time     06/10/2025 1050    K 4.4 06/10/2025 1050     06/10/2025 1050    CO2 32 06/10/2025 1050    BUN 15 06/10/2025 1050    CREATININE 0.74 06/10/2025 1050    Lab Results   Component Value Date/Time    CALCIUM 10.0 06/10/2025 1050    ALKPHOS 32 (L) 06/10/2025 1050    AST 17 06/10/2025 1050    ALT 13 06/10/2025 1050    BILITOT 0.5 06/10/2025 1050           Labs reviewed and patient meets treatment conditions? Yes    DRUG SPECIFIC QUESTIONS:  - Latex Allergy? No     (Caution: Prefilled syringe may include Latex)    - Any new or worsening signs / symptoms of heart failure which may include things like worsening shortness of breath, swelling, fatigue? No   (If yes notify provider before proceeding with today's infusion. New onset or worsening HF have been reported)      REMINDERS:  Recommended Vitals/Observation:  Vitals: Obtain vitals prior to injection.  Observation: Patient may leave immediately following injection.        Weight Based Drug Calculations:    WEIGHT BASED DRUGS: NOT APPLICABLE / FLAT DOSE          Initiated By: Elle Cortez LPN  _________________________________________________________________________    Note authored and patient cared for by: Elle Cortez LPN  Note/Encounter reviewed by: Winifred GUILLEN NP. This provider on site at time of patient injection. Staff to notify this provider of any questions, concerns, abnormals or issues during encounter. No issues reported. Pt. tolerated without difficulty. Pt. not independently evaluated by this provider during today's encounter

## 2025-06-18 PROCEDURE — RXMED WILLOW AMBULATORY MEDICATION CHARGE

## 2025-06-19 ENCOUNTER — TELEPHONE (OUTPATIENT)
Dept: PRIMARY CARE | Facility: CLINIC | Age: 75
End: 2025-06-19
Payer: MEDICARE

## 2025-06-20 ENCOUNTER — PHARMACY VISIT (OUTPATIENT)
Dept: PHARMACY | Facility: CLINIC | Age: 75
End: 2025-06-20
Payer: COMMERCIAL

## 2025-06-22 DIAGNOSIS — M06.9 RHEUMATOID ARTHRITIS, INVOLVING UNSPECIFIED SITE, UNSPECIFIED WHETHER RHEUMATOID FACTOR PRESENT (MULTI): ICD-10-CM

## 2025-06-24 ENCOUNTER — APPOINTMENT (OUTPATIENT)
Dept: RHEUMATOLOGY | Facility: CLINIC | Age: 75
End: 2025-06-24
Payer: MEDICARE

## 2025-06-24 RX ORDER — GABAPENTIN 300 MG/1
CAPSULE ORAL
Qty: 270 CAPSULE | Refills: 0 | Status: SHIPPED | OUTPATIENT
Start: 2025-06-24

## 2025-06-26 ENCOUNTER — APPOINTMENT (OUTPATIENT)
Dept: PRIMARY CARE | Facility: CLINIC | Age: 75
End: 2025-06-26
Payer: MEDICARE

## 2025-06-30 ENCOUNTER — TELEPHONE (OUTPATIENT)
Dept: PRIMARY CARE | Facility: CLINIC | Age: 75
End: 2025-06-30
Payer: MEDICARE

## 2025-06-30 NOTE — TELEPHONE ENCOUNTER
Bret pt.  Pt c/o cough with green mucus, congestion, sinus pressure, headache, bodyaches, little SOB without exertion d/t congestion x5 days.  No CP, vomiting, diarrhea.  States feels like moving to lungs.  Pt states COVID test this morning is negative.    Has been treating symptoms with OTC congestion med, nyquil, nasal spray.      Requesting further instruction or Rx to [address]

## 2025-07-01 ENCOUNTER — APPOINTMENT (OUTPATIENT)
Dept: RHEUMATOLOGY | Facility: CLINIC | Age: 75
End: 2025-07-01
Payer: MEDICARE

## 2025-07-07 ENCOUNTER — APPOINTMENT (OUTPATIENT)
Dept: INFUSION THERAPY | Facility: CLINIC | Age: 75
End: 2025-07-07
Payer: MEDICARE

## 2025-07-15 ENCOUNTER — APPOINTMENT (OUTPATIENT)
Dept: INFUSION THERAPY | Facility: CLINIC | Age: 75
End: 2025-07-15
Payer: MEDICARE

## 2025-07-15 VITALS
OXYGEN SATURATION: 95 % | SYSTOLIC BLOOD PRESSURE: 122 MMHG | HEART RATE: 85 BPM | RESPIRATION RATE: 18 BRPM | DIASTOLIC BLOOD PRESSURE: 60 MMHG | WEIGHT: 147.5 LBS | TEMPERATURE: 97.7 F | BODY MASS INDEX: 26.76 KG/M2

## 2025-07-15 DIAGNOSIS — M06.09 RHEUMATOID ARTHRITIS OF MULTIPLE SITES WITHOUT RHEUMATOID FACTOR (MULTI): ICD-10-CM

## 2025-07-15 PROCEDURE — 96372 THER/PROPH/DIAG INJ SC/IM: CPT | Performed by: REGISTERED NURSE

## 2025-07-15 RX ORDER — FAMOTIDINE 10 MG/ML
20 INJECTION, SOLUTION INTRAVENOUS ONCE AS NEEDED
OUTPATIENT
Start: 2025-08-12

## 2025-07-15 RX ORDER — DIPHENHYDRAMINE HYDROCHLORIDE 50 MG/ML
50 INJECTION, SOLUTION INTRAMUSCULAR; INTRAVENOUS AS NEEDED
OUTPATIENT
Start: 2025-08-12

## 2025-07-15 RX ORDER — ALBUTEROL SULFATE 0.83 MG/ML
3 SOLUTION RESPIRATORY (INHALATION) AS NEEDED
OUTPATIENT
Start: 2025-08-12

## 2025-07-15 RX ORDER — EPINEPHRINE 0.3 MG/.3ML
0.3 INJECTION SUBCUTANEOUS EVERY 5 MIN PRN
OUTPATIENT
Start: 2025-08-12

## 2025-07-15 ASSESSMENT — ENCOUNTER SYMPTOMS
COUGH: 1
NUMBNESS: 0
DIZZINESS: 0
WOUND: 0
EXTREMITY WEAKNESS: 0
SHORTNESS OF BREATH: 0
PALPITATIONS: 0
LIGHT-HEADEDNESS: 0
LEG SWELLING: 0
WHEEZING: 0

## 2025-07-15 NOTE — PROGRESS NOTES
[facility]   Infusion Clinic Note   Date: July 15, 2025   Name: Heather Thurston  : 1950   MRN: [id number]          Reason for Visit: Follow-up and Injections (Monthly Cimzia 400mg subcutaneous/injection)         Today: We administered certolizumab pegol.       Visit Type: INJECTION       Ordered By:  Ludivina Chowdhury M.D.       Accompanied by:Self       Diagnosis: Rheumatoid arthritis of multiple sites without rheumatoid factor (Multi)        Allergies:   Allergies as of 07/15/2025 - Reviewed 07/15/2025   Allergen Reaction Noted    Atropine Anaphylaxis, Nausea And Vomiting, Palpitations, and Rash 2023    Codeine Itching and Unknown 2023    Ezetimibe Unknown 2023    Iodinated contrast media Other and Syncope 2023    Iodine Nausea/vomiting 10/16/2019    Lisinopril Cough 2023    Metformin hcl Diarrhea 2023    Simvastatin Unknown 2023    Adhesive tape-silicones Rash 2023          Current Medications has a current medication list which includes the following prescription(s): acetaminophen, amitriptyline, ascorbic acid, calcium carbonate, calcium citrate, cimzia, cholecalciferol, cyanocobalamin, prolia, dicyclomine, trulicity, famotidine, gabapentin, leflunomide, multivit-minerals/folic acid, omeprazole, oxybutynin, sertraline, sulfasalazine, and tramadol.       Vitals:   Vitals:    07/15/25 1501   BP: 122/60   Pulse: 85   Resp: 18   Temp: 36.5 °C (97.7 °F)   TempSrc: Temporal   SpO2: 95%   Weight: 66.9 kg (147 lb 8 oz)             Infusion Pre-procedure Checklist:   - Allergies reviewed: yes   - Medications reviewed: yes       - Previous reaction to current treatment: no      Assess patient for the concerns below. Document provider notification as appropriate.  - Active or recent infection with/without current antibiotic use: Yes.  Patient took ATB at the dentist last week for a pre-existing condition.   (Knee surgery)  - Recent or planned  invasive dental work: Yes.  Cavities  - Recent or planned surgeries:  no  - Recently received or plans to receive vaccination no    - Has treatment related toxicities: no  - Is pregnant:  no      Pain: 5/10 Generalized pain due to arthritis (spine, back)   - Is the pain different from normal: no   - Is your Doctor aware:  n/a       Fall Risk Screening: Derik Fall Risk  History of Falling, Immediate or Within 3 Months: No  Secondary Diagnosis: No  Ambulatory Aid: Walks without aid/bedrest/nurse assist  Intravenous Therapy/Heparin Lock: No  Gait/Transferring: Normal/bedrest/immobile  Mental Status: Oriented to own ability  More Fall Risk Score: 0       Review Of Systems:  Review of Systems   Respiratory:  Positive for cough. Negative for shortness of breath and wheezing.         S/P  Sinsus   Cardiovascular:  Negative for chest pain, leg swelling and palpitations.   Skin:  Negative for itching, rash and wound.        Patient has minimal itching   Neurological:  Negative for dizziness, extremity weakness, light-headedness and numbness.        .            ROS completed? Yes      Infusion Readiness:  - Assessment Concerns Related to Infusion: No  - Provider notified: no      Document Below Only If Indicated:   New Patient Education:    N/A (returning patient for continuation of therapy. Ongoing education provided as needed.)        Treatment Conditions & Drug Specific Questions:    Certolizumab Pegol  (CIMZIA)    (Unless otherwise specified on patient specific therapy plan):     TREATMENT CONDITIONS:  Unless otherwise specified on patient specific therapy plan HOLD treatment and notify provider prior to proceeding with today's infusion if patient with a:  o Positive Hepatitis B Surface Ag  o Positive T-Spot  o NO baseline CBC-D obtained  o NO baseline CMP obtained    Lab Results   Component Value Date    TBSIN Negative 07/30/2024      Lab Results   Component Value Date    WBC 4.8 06/10/2025    HGB 14.0 06/10/2025     HCT 45.7 (H) 06/10/2025    MCV 96.2 06/10/2025     06/10/2025        Chemistry    Lab Results   Component Value Date/Time     06/10/2025 1050    K 4.4 06/10/2025 1050     06/10/2025 1050    CO2 32 06/10/2025 1050    BUN 15 06/10/2025 1050    CREATININE 0.74 06/10/2025 1050    Lab Results   Component Value Date/Time    CALCIUM 10.0 06/10/2025 1050    ALKPHOS 32 (L) 06/10/2025 1050    AST 17 06/10/2025 1050    ALT 13 06/10/2025 1050    BILITOT 0.5 06/10/2025 1050           Labs reviewed and patient meets treatment conditions? Yes    DRUG SPECIFIC QUESTIONS:  - Latex Allergy? No     (Caution: Prefilled syringe may include Latex)    - Any new or worsening signs / symptoms of heart failure which may include things like worsening shortness of breath, swelling, fatigue? No   (If yes notify provider before proceeding with today's infusion. New onset or worsening HF have been reported)      REMINDERS:  Recommended Vitals/Observation:  Vitals: Obtain vitals prior to injection.  Observation: Patient may leave immediately following injection.        Weight Based Drug Calculations:    WEIGHT BASED DRUGS: NOT APPLICABLE / FLAT DOSE          Initiated By: Elle Cortez LPN  _________________________________________________________________________    Note authored and patient cared for by: Elle Cortez LPN

## 2025-07-15 NOTE — PATIENT INSTRUCTIONS
Today :We administered certolizumab pegol. Monthly Cimzia 400mg (total) subcutaneous injection.  200mg in the right upper abdomen  200mg in the left upper abdomen    For:   1. Rheumatoid arthritis of multiple sites without rheumatoid factor (Multi)       Your next appointment is due in:  1 month        Please read the  Medication Guide that was given to you and reviewed during todays visit.     (Tell all doctors including dentists that you are taking this medication)     Go to the emergency room or call 911 if:  -You have signs of allergic reaction:   -Rash, hives, itching.   -Swollen, blistered, peeling skin.   -Swelling of face, lips, mouth, tongue or throat.   -Tightness of chest, trouble breathing, swallowing or talking     Call your doctor:  - If injection site gets red, warm, swollen, itchy or leaks fluid or pus.     (Leave band aid on your injection site for at least 2 hours and keep area clean and dry  - If you get sick or have symptoms of infection or are not feeling well for any reason.    (Wash your hands often, stay away from people who are sick)  - If you have side effects from your medication that do not go away or are bothersome.     (Refer to the teaching your nurse gave you for side effects to call your doctor about)    - Common side effects may include:  stuffy nose, headache, feeling tired, muscle aches, upset stomach  - Before receiving any vaccines     - Call the [facility] at   If:  - You get sick, are on antibiotics, have had a recent vaccine, have surgery or dental work and your doctor wants your visit rescheduled.  - You need to cancel and reschedule your visit for any reason. Call at least 2 days before your visit if you need to cancel.   - Your insurance changes before your next visit.    (We will need to get approval from your new insurance. This can take up to two weeks.)     The [facility] is opened Monday thru Friday. We are closed on weekends and  holidays.   Voice mail will take your call if the center is closed. If you leave a message please allow 24 hours for a call back during weekdays. If you leave a message on a weekend/holiday, we will call you back the next business day.    A pharmacist is available Monday - Friday from 8:30AM to 3:30PM to help answer any questions you may have about your prescriptions(s). Please call pharmacy at:    [facility]: [phone]  [facility]: [phone]  [facility]: [phone]

## 2025-07-16 PROCEDURE — RXMED WILLOW AMBULATORY MEDICATION CHARGE

## 2025-07-19 ENCOUNTER — PHARMACY VISIT (OUTPATIENT)
Dept: PHARMACY | Facility: CLINIC | Age: 75
End: 2025-07-19
Payer: COMMERCIAL

## 2025-07-23 ENCOUNTER — APPOINTMENT (OUTPATIENT)
Dept: RHEUMATOLOGY | Facility: CLINIC | Age: 75
End: 2025-07-23
Payer: MEDICARE

## 2025-07-23 VITALS
HEART RATE: 92 BPM | HEIGHT: 62 IN | WEIGHT: 148 LBS | BODY MASS INDEX: 27.23 KG/M2 | DIASTOLIC BLOOD PRESSURE: 76 MMHG | OXYGEN SATURATION: 95 % | SYSTOLIC BLOOD PRESSURE: 125 MMHG

## 2025-07-23 DIAGNOSIS — M06.09 RHEUMATOID ARTHRITIS OF MULTIPLE SITES WITHOUT RHEUMATOID FACTOR (MULTI): ICD-10-CM

## 2025-07-23 DIAGNOSIS — Z79.899 ENCOUNTER FOR LONG-TERM (CURRENT) USE OF MEDICATIONS: Primary | ICD-10-CM

## 2025-07-23 DIAGNOSIS — M06.9 RHEUMATOID ARTHRITIS, INVOLVING UNSPECIFIED SITE, UNSPECIFIED WHETHER RHEUMATOID FACTOR PRESENT (MULTI): ICD-10-CM

## 2025-07-23 DIAGNOSIS — M19.90 CHRONIC ARTHRITIS: ICD-10-CM

## 2025-07-23 DIAGNOSIS — M81.0 AGE RELATED OSTEOPOROSIS, UNSPECIFIED PATHOLOGICAL FRACTURE PRESENCE: ICD-10-CM

## 2025-07-23 PROCEDURE — 1125F AMNT PAIN NOTED PAIN PRSNT: CPT | Performed by: INTERNAL MEDICINE

## 2025-07-23 PROCEDURE — 3078F DIAST BP <80 MM HG: CPT | Performed by: INTERNAL MEDICINE

## 2025-07-23 PROCEDURE — 3074F SYST BP LT 130 MM HG: CPT | Performed by: INTERNAL MEDICINE

## 2025-07-23 PROCEDURE — G2211 COMPLEX E/M VISIT ADD ON: HCPCS | Performed by: INTERNAL MEDICINE

## 2025-07-23 PROCEDURE — 99214 OFFICE O/P EST MOD 30 MIN: CPT | Performed by: INTERNAL MEDICINE

## 2025-07-23 PROCEDURE — 1159F MED LIST DOCD IN RCRD: CPT | Performed by: INTERNAL MEDICINE

## 2025-07-23 RX ORDER — LEFLUNOMIDE 20 MG/1
20 TABLET ORAL DAILY
Qty: 90 TABLET | Refills: 1 | Status: SHIPPED | OUTPATIENT
Start: 2025-07-23

## 2025-07-23 ASSESSMENT — PAIN SCALES - GENERAL: PAINLEVEL_OUTOF10: 6

## 2025-07-23 ASSESSMENT — ENCOUNTER SYMPTOMS
COUGH: 1
FATIGUE: 1
ROS GI COMMENTS: HEARTBURN
NAUSEA: 1
SHORTNESS OF BREATH: 1
WEAKNESS: 1
ROS SKIN COMMENTS: HAIR THINNING

## 2025-07-23 NOTE — PROGRESS NOTES
Subjective   Patient ID: Heather Thurston is a 75 y.o. female who presents for Follow-up (4 month ).  HPI  Patient with history of rheumatoid arthritis positive CCP, osteoarthritis, chronic back pain, with neuropathy.  Also has a history of gastric bypass, carpal tunnel release, hysterectomy, cholecystectomy and bilateral knee replacement previous patient of Dr. Cali Jameson    DEXA 7/25/2024 lumbar spine -1.6, left total hip -1.5, left femoral neck -2.1  DEXA 3/23/2021 lumbar spine -2.4 left total hip -1.4, left femoral neck -2.0.    Patient was last seen in February and at that time we had her continue with Cimzia injection.  Patient no longer on leflunomide and she does not remember when she last was on this medication.  She feels that after the injection it usually last for about 2 weeks and then she starts having some increase in symptoms.  Right now she has been left some and wrist.  Her fingers are achy.  Also having some referred pain going down her right upper extremity and thinks it is from her neck.    She denies any infections.    She did have a fall and does not really know how it happened but denies any fractures.    She does take tramadol 1 a day and feels that it is appropriate for symptoms and she does not have any side effects.  It does help with her pain.     Review of Systems   Constitutional:  Positive for fatigue.   HENT:          Dry mouth   Eyes:         Dry eyes   Respiratory:  Positive for cough and shortness of breath.    Gastrointestinal:  Positive for nausea.        Heartburn   Musculoskeletal:         Morning stiffness, joint pain, muscle weakness and tenderness, joint swelling   Skin:         hAir thinning   Neurological:  Positive for weakness.       Objective   Physical Exam  GEN: NAD A&O x3 appropriate affect  HENT: no enlarged glands or thyroid  EYES:  no conjunctival redness, eyelids normal  LYMPH: no cervica lymphadenopathy  Lungs some wheezing coarse sounds  NEURO: 5/5 strength  upper and lower extremities, DTR +2 bicep and patellar tendons  SKIN: no rashes, ulcers, or subcutaneous nodules  PULSES: +radials  TENDER POINTS: 0/18   MSK:  Bilateral knee replacements  Fullness in the left wrist and thumb and tenderness some achiness in the PIPs and MCPs  Assessment/Plan     Seronegative rheumatoid arthritis positive CCP.  Previously had been on sulfasalazine   - Currently on Cimzia injections.  I would like her to restart her leflunomide to see if that might be helpful for her joint symptoms.  Reviewed her recent blood work and we will also check her ESR and CRP.  She updated her urine drug screen and controlled substance contract.  Her Medrol Dosepak that you can get an chest x-ray.    Postmenopausal osteoporosis with history of gastric bypass.  Has had stress fractures in her feet previously.   DEXA 7/25/2024 lumbar spine -1.6, left total hip -1.5, left femoral neck -2.1  DEXA 3/23/2021 lumbar spine -2.4 left total hip -1.4, left femoral neck -2.0   -Will have her continue with Prolia    Osteoarthritis and chronic pain   -Patient says she was using tramadol once a day.  She is up-to-date with her urine drug screen and controlled substance contract.  Checked [place].      Ludivina Chowdhury MD 07/23/25 1:29 PM

## 2025-08-04 ENCOUNTER — APPOINTMENT (OUTPATIENT)
Dept: INFUSION THERAPY | Facility: CLINIC | Age: 75
End: 2025-08-04
Payer: MEDICARE

## 2025-08-08 DIAGNOSIS — M19.90 CHRONIC ARTHRITIS: ICD-10-CM

## 2025-08-08 RX ORDER — TRAMADOL HYDROCHLORIDE 50 MG/1
50 TABLET, FILM COATED ORAL DAILY
Qty: 30 TABLET | Refills: 1 | Status: SHIPPED | OUTPATIENT
Start: 2025-08-08

## 2025-08-09 PROCEDURE — RXMED WILLOW AMBULATORY MEDICATION CHARGE

## 2025-08-11 ENCOUNTER — APPOINTMENT (OUTPATIENT)
Dept: INFUSION THERAPY | Facility: CLINIC | Age: 75
End: 2025-08-11
Payer: MEDICARE

## 2025-08-11 VITALS
BODY MASS INDEX: 25.98 KG/M2 | OXYGEN SATURATION: 99 % | TEMPERATURE: 98.1 F | DIASTOLIC BLOOD PRESSURE: 76 MMHG | WEIGHT: 143.2 LBS | SYSTOLIC BLOOD PRESSURE: 123 MMHG | HEART RATE: 83 BPM | RESPIRATION RATE: 18 BRPM

## 2025-08-11 DIAGNOSIS — M06.09 RHEUMATOID ARTHRITIS OF MULTIPLE SITES WITHOUT RHEUMATOID FACTOR (MULTI): ICD-10-CM

## 2025-08-11 PROBLEM — I10 BENIGN HYPERTENSION: Status: RESOLVED | Noted: 2023-08-26 | Resolved: 2025-08-11

## 2025-08-11 PROCEDURE — 96372 THER/PROPH/DIAG INJ SC/IM: CPT | Performed by: NURSE PRACTITIONER

## 2025-08-11 RX ORDER — ALBUTEROL SULFATE 0.83 MG/ML
3 SOLUTION RESPIRATORY (INHALATION) AS NEEDED
Status: DISCONTINUED | OUTPATIENT
Start: 2025-08-11 | End: 2025-08-11 | Stop reason: HOSPADM

## 2025-08-11 RX ORDER — EPINEPHRINE 0.3 MG/.3ML
0.3 INJECTION SUBCUTANEOUS EVERY 5 MIN PRN
OUTPATIENT
Start: 2025-08-12

## 2025-08-11 RX ORDER — EPINEPHRINE 0.3 MG/.3ML
0.3 INJECTION SUBCUTANEOUS EVERY 5 MIN PRN
Status: DISCONTINUED | OUTPATIENT
Start: 2025-08-11 | End: 2025-08-11 | Stop reason: HOSPADM

## 2025-08-11 RX ORDER — DIPHENHYDRAMINE HYDROCHLORIDE 50 MG/ML
50 INJECTION, SOLUTION INTRAMUSCULAR; INTRAVENOUS AS NEEDED
OUTPATIENT
Start: 2025-08-12

## 2025-08-11 RX ORDER — FAMOTIDINE 10 MG/ML
20 INJECTION, SOLUTION INTRAVENOUS ONCE AS NEEDED
OUTPATIENT
Start: 2025-08-12

## 2025-08-11 RX ORDER — ALBUTEROL SULFATE 0.83 MG/ML
3 SOLUTION RESPIRATORY (INHALATION) AS NEEDED
Status: CANCELLED | OUTPATIENT
Start: 2025-08-12

## 2025-08-11 RX ORDER — FAMOTIDINE 10 MG/ML
20 INJECTION, SOLUTION INTRAVENOUS ONCE AS NEEDED
Status: DISCONTINUED | OUTPATIENT
Start: 2025-08-11 | End: 2025-08-11 | Stop reason: HOSPADM

## 2025-08-11 RX ORDER — DIPHENHYDRAMINE HYDROCHLORIDE 50 MG/ML
50 INJECTION, SOLUTION INTRAMUSCULAR; INTRAVENOUS AS NEEDED
Status: DISCONTINUED | OUTPATIENT
Start: 2025-08-11 | End: 2025-08-11 | Stop reason: HOSPADM

## 2025-08-11 RX ORDER — EPINEPHRINE 0.3 MG/.3ML
0.3 INJECTION SUBCUTANEOUS EVERY 5 MIN PRN
Status: CANCELLED | OUTPATIENT
Start: 2025-08-12

## 2025-08-11 RX ORDER — ALBUTEROL SULFATE 0.83 MG/ML
3 SOLUTION RESPIRATORY (INHALATION) AS NEEDED
OUTPATIENT
Start: 2025-08-12

## 2025-08-11 RX ORDER — FAMOTIDINE 10 MG/ML
20 INJECTION, SOLUTION INTRAVENOUS ONCE AS NEEDED
Status: CANCELLED | OUTPATIENT
Start: 2025-08-12

## 2025-08-11 RX ORDER — DIPHENHYDRAMINE HYDROCHLORIDE 50 MG/ML
50 INJECTION, SOLUTION INTRAMUSCULAR; INTRAVENOUS AS NEEDED
Status: CANCELLED | OUTPATIENT
Start: 2025-08-12

## 2025-08-11 ASSESSMENT — ENCOUNTER SYMPTOMS
LEG SWELLING: 0
LIGHT-HEADEDNESS: 0
WHEEZING: 0
EXTREMITY WEAKNESS: 0
DIZZINESS: 0
COUGH: 0
SHORTNESS OF BREATH: 0
WOUND: 0
PALPITATIONS: 0
NUMBNESS: 1

## 2025-08-12 ENCOUNTER — PHARMACY VISIT (OUTPATIENT)
Dept: PHARMACY | Facility: CLINIC | Age: 75
End: 2025-08-12
Payer: COMMERCIAL

## 2025-08-12 DIAGNOSIS — E11.65 TYPE 2 DIABETES MELLITUS WITH HYPERGLYCEMIA, WITHOUT LONG-TERM CURRENT USE OF INSULIN: Primary | ICD-10-CM

## 2025-08-15 ENCOUNTER — OFFICE VISIT (OUTPATIENT)
Dept: PRIMARY CARE | Facility: CLINIC | Age: 75
End: 2025-08-15
Payer: MEDICARE

## 2025-08-15 VITALS
SYSTOLIC BLOOD PRESSURE: 138 MMHG | TEMPERATURE: 96.9 F | OXYGEN SATURATION: 91 % | HEART RATE: 81 BPM | DIASTOLIC BLOOD PRESSURE: 84 MMHG | BODY MASS INDEX: 25.58 KG/M2 | WEIGHT: 139 LBS | HEIGHT: 62 IN

## 2025-08-15 DIAGNOSIS — M06.9 RHEUMATOID ARTHRITIS, INVOLVING UNSPECIFIED SITE, UNSPECIFIED WHETHER RHEUMATOID FACTOR PRESENT (MULTI): ICD-10-CM

## 2025-08-15 DIAGNOSIS — N32.81 OVERACTIVE BLADDER: ICD-10-CM

## 2025-08-15 DIAGNOSIS — M79.2 NEUROPATHIC PAIN: ICD-10-CM

## 2025-08-15 DIAGNOSIS — R00.2 PALPITATIONS: Primary | ICD-10-CM

## 2025-08-15 DIAGNOSIS — E53.8 B12 DEFICIENCY: ICD-10-CM

## 2025-08-15 DIAGNOSIS — E11.65 TYPE 2 DIABETES MELLITUS WITH HYPERGLYCEMIA, WITHOUT LONG-TERM CURRENT USE OF INSULIN: ICD-10-CM

## 2025-08-15 DIAGNOSIS — E11.9 TYPE 2 DIABETES MELLITUS WITHOUT COMPLICATION, UNSPECIFIED WHETHER LONG TERM INSULIN USE: ICD-10-CM

## 2025-08-15 DIAGNOSIS — E53.8 VITAMIN B12 DEFICIENCY: ICD-10-CM

## 2025-08-15 DIAGNOSIS — K21.9 GASTROESOPHAGEAL REFLUX DISEASE, UNSPECIFIED WHETHER ESOPHAGITIS PRESENT: ICD-10-CM

## 2025-08-15 DIAGNOSIS — E55.9 VITAMIN D DEFICIENCY: ICD-10-CM

## 2025-08-15 PROCEDURE — 2500000004 HC RX 250 GENERAL PHARMACY W/ HCPCS (ALT 636 FOR OP/ED): Performed by: INTERNAL MEDICINE

## 2025-08-15 PROCEDURE — 96372 THER/PROPH/DIAG INJ SC/IM: CPT | Performed by: INTERNAL MEDICINE

## 2025-08-15 RX ORDER — OMEPRAZOLE 40 MG/1
40 CAPSULE, DELAYED RELEASE ORAL
Qty: 180 CAPSULE | Refills: 3 | Status: SHIPPED | OUTPATIENT
Start: 2025-08-15 | End: 2026-08-15

## 2025-08-15 RX ORDER — OXYBUTYNIN CHLORIDE 5 MG/1
5 TABLET ORAL DAILY
Qty: 90 TABLET | Refills: 2 | Status: SHIPPED | OUTPATIENT
Start: 2025-08-15

## 2025-08-15 RX ORDER — SERTRALINE HYDROCHLORIDE 50 MG/1
50 TABLET, FILM COATED ORAL DAILY
Qty: 90 TABLET | Refills: 2 | Status: SHIPPED | OUTPATIENT
Start: 2025-08-15

## 2025-08-15 RX ORDER — DULAGLUTIDE 1.5 MG/.5ML
1.5 INJECTION, SOLUTION SUBCUTANEOUS
Qty: 2 ML | Refills: 11 | Status: SHIPPED | OUTPATIENT
Start: 2025-08-15 | End: 2025-08-19 | Stop reason: SDUPTHER

## 2025-08-15 RX ORDER — FAMOTIDINE 10 MG/1
10 TABLET ORAL DAILY PRN
Start: 2025-08-15

## 2025-08-15 RX ORDER — CYANOCOBALAMIN 1000 UG/ML
1000 INJECTION, SOLUTION INTRAMUSCULAR; SUBCUTANEOUS
Qty: 1 ML | Refills: 0 | Status: SHIPPED | OUTPATIENT
Start: 2025-08-15

## 2025-08-15 RX ORDER — GABAPENTIN 300 MG/1
300 CAPSULE ORAL 2 TIMES DAILY
Qty: 180 CAPSULE | Refills: 1 | Status: SHIPPED | OUTPATIENT
Start: 2025-08-15

## 2025-08-15 RX ORDER — DULAGLUTIDE 1.5 MG/.5ML
1.5 INJECTION, SOLUTION SUBCUTANEOUS WEEKLY
Qty: 2 ML | Refills: 11 | Status: SHIPPED | OUTPATIENT
Start: 2025-08-15 | End: 2025-08-15

## 2025-08-15 RX ORDER — ERGOCALCIFEROL 1.25 MG/1
1.25 CAPSULE ORAL WEEKLY
Qty: 12 CAPSULE | Refills: 1 | Status: SHIPPED | OUTPATIENT
Start: 2025-08-15

## 2025-08-15 RX ADMIN — CYANOCOBALAMIN 1000 MCG: 1000 INJECTION, SOLUTION INTRAMUSCULAR at 09:07

## 2025-08-15 ASSESSMENT — PAIN SCALES - GENERAL: PAINLEVEL_OUTOF10: 5

## 2025-08-18 ENCOUNTER — TELEPHONE (OUTPATIENT)
Dept: PRIMARY CARE | Facility: CLINIC | Age: 75
End: 2025-08-18
Payer: MEDICARE

## 2025-08-19 DIAGNOSIS — E11.9 TYPE 2 DIABETES MELLITUS WITHOUT COMPLICATION, UNSPECIFIED WHETHER LONG TERM INSULIN USE: ICD-10-CM

## 2025-08-19 RX ORDER — DULAGLUTIDE 1.5 MG/.5ML
1.5 INJECTION, SOLUTION SUBCUTANEOUS WEEKLY
Qty: 3 ML | Refills: 1 | Status: SHIPPED | OUTPATIENT
Start: 2025-08-19

## 2025-08-19 RX ORDER — CYANOCOBALAMIN 1000 UG/ML
1000 INJECTION, SOLUTION INTRAMUSCULAR; SUBCUTANEOUS ONCE
Status: COMPLETED | OUTPATIENT
Start: 2025-08-19 | End: 2025-08-15

## 2025-08-26 ENCOUNTER — APPOINTMENT (OUTPATIENT)
Dept: PHARMACY | Facility: HOSPITAL | Age: 75
End: 2025-08-26
Payer: MEDICARE

## 2025-08-26 DIAGNOSIS — E11.65 TYPE 2 DIABETES MELLITUS WITH HYPERGLYCEMIA, WITHOUT LONG-TERM CURRENT USE OF INSULIN: ICD-10-CM

## 2025-08-26 PROCEDURE — RXMED WILLOW AMBULATORY MEDICATION CHARGE

## 2025-08-26 RX ORDER — LANCETS
1 EACH MISCELLANEOUS DAILY
Qty: 100 EACH | Refills: 3 | Status: SHIPPED | OUTPATIENT
Start: 2025-08-26

## 2025-08-26 RX ORDER — DULAGLUTIDE 0.75 MG/.5ML
0.75 INJECTION, SOLUTION SUBCUTANEOUS WEEKLY
Qty: 6 ML | Refills: 3 | Status: SHIPPED | OUTPATIENT
Start: 2025-08-26

## 2025-08-26 RX ORDER — IBUPROFEN 200 MG
1 CAPSULE ORAL DAILY
Qty: 100 EACH | Refills: 3 | Status: SHIPPED | OUTPATIENT
Start: 2025-08-26

## 2025-08-26 RX ORDER — INSULIN PUMP SYRINGE, 3 ML
1 EACH MISCELLANEOUS DAILY
Qty: 1 EACH | Refills: 0 | Status: SHIPPED | OUTPATIENT
Start: 2025-08-26

## 2025-08-27 ENCOUNTER — PHARMACY VISIT (OUTPATIENT)
Dept: PHARMACY | Facility: CLINIC | Age: 75
End: 2025-08-27
Payer: COMMERCIAL

## 2025-09-02 ENCOUNTER — APPOINTMENT (OUTPATIENT)
Dept: INFUSION THERAPY | Facility: CLINIC | Age: 75
End: 2025-09-02
Payer: MEDICARE

## 2025-09-09 ENCOUNTER — APPOINTMENT (OUTPATIENT)
Dept: INFUSION THERAPY | Facility: CLINIC | Age: 75
End: 2025-09-09
Payer: MEDICARE

## 2025-09-29 ENCOUNTER — APPOINTMENT (OUTPATIENT)
Dept: INFUSION THERAPY | Facility: CLINIC | Age: 75
End: 2025-09-29
Payer: MEDICARE

## 2025-09-30 ENCOUNTER — APPOINTMENT (OUTPATIENT)
Dept: PHARMACY | Facility: HOSPITAL | Age: 75
End: 2025-09-30
Payer: MEDICARE

## 2025-10-06 ENCOUNTER — APPOINTMENT (OUTPATIENT)
Dept: INFUSION THERAPY | Facility: CLINIC | Age: 75
End: 2025-10-06
Payer: MEDICARE

## 2025-11-03 ENCOUNTER — APPOINTMENT (OUTPATIENT)
Dept: INFUSION THERAPY | Facility: CLINIC | Age: 75
End: 2025-11-03
Payer: MEDICARE

## 2025-12-02 ENCOUNTER — APPOINTMENT (OUTPATIENT)
Dept: INFUSION THERAPY | Facility: CLINIC | Age: 75
End: 2025-12-02
Payer: MEDICARE

## 2025-12-30 ENCOUNTER — APPOINTMENT (OUTPATIENT)
Dept: INFUSION THERAPY | Facility: CLINIC | Age: 75
End: 2025-12-30
Payer: MEDICARE

## 2026-01-21 ENCOUNTER — APPOINTMENT (OUTPATIENT)
Dept: RHEUMATOLOGY | Facility: CLINIC | Age: 76
End: 2026-01-21
Payer: MEDICARE

## 2026-01-27 ENCOUNTER — APPOINTMENT (OUTPATIENT)
Dept: INFUSION THERAPY | Facility: CLINIC | Age: 76
End: 2026-01-27
Payer: MEDICARE

## 2026-02-23 ENCOUNTER — APPOINTMENT (OUTPATIENT)
Dept: INFUSION THERAPY | Facility: CLINIC | Age: 76
End: 2026-02-23
Payer: MEDICARE

## 2026-03-24 ENCOUNTER — APPOINTMENT (OUTPATIENT)
Dept: INFUSION THERAPY | Facility: CLINIC | Age: 76
End: 2026-03-24
Payer: MEDICARE

## 2026-04-21 ENCOUNTER — APPOINTMENT (OUTPATIENT)
Dept: INFUSION THERAPY | Facility: CLINIC | Age: 76
End: 2026-04-21
Payer: MEDICARE

## 2026-05-19 ENCOUNTER — APPOINTMENT (OUTPATIENT)
Dept: INFUSION THERAPY | Facility: CLINIC | Age: 76
End: 2026-05-19
Payer: MEDICARE

## 2026-06-16 ENCOUNTER — APPOINTMENT (OUTPATIENT)
Dept: INFUSION THERAPY | Facility: CLINIC | Age: 76
End: 2026-06-16
Payer: MEDICARE

## 2026-07-14 ENCOUNTER — APPOINTMENT (OUTPATIENT)
Dept: INFUSION THERAPY | Facility: CLINIC | Age: 76
End: 2026-07-14
Payer: MEDICARE

## 2026-08-11 ENCOUNTER — APPOINTMENT (OUTPATIENT)
Dept: INFUSION THERAPY | Facility: CLINIC | Age: 76
End: 2026-08-11
Payer: MEDICARE

## 2026-09-08 ENCOUNTER — APPOINTMENT (OUTPATIENT)
Dept: INFUSION THERAPY | Facility: CLINIC | Age: 76
End: 2026-09-08
Payer: MEDICARE